# Patient Record
Sex: FEMALE | Race: WHITE | NOT HISPANIC OR LATINO | Employment: FULL TIME | URBAN - METROPOLITAN AREA
[De-identification: names, ages, dates, MRNs, and addresses within clinical notes are randomized per-mention and may not be internally consistent; named-entity substitution may affect disease eponyms.]

---

## 2017-07-25 ENCOUNTER — LAB VISIT (OUTPATIENT)
Dept: LAB | Facility: HOSPITAL | Age: 32
End: 2017-07-25
Attending: OBSTETRICS & GYNECOLOGY
Payer: COMMERCIAL

## 2017-07-25 ENCOUNTER — OFFICE VISIT (OUTPATIENT)
Dept: OBSTETRICS AND GYNECOLOGY | Facility: CLINIC | Age: 32
End: 2017-07-25
Payer: COMMERCIAL

## 2017-07-25 VITALS
BODY MASS INDEX: 48.45 KG/M2 | HEIGHT: 61 IN | DIASTOLIC BLOOD PRESSURE: 70 MMHG | SYSTOLIC BLOOD PRESSURE: 120 MMHG | WEIGHT: 256.63 LBS

## 2017-07-25 DIAGNOSIS — Z11.3 ROUTINE SCREENING FOR STI (SEXUALLY TRANSMITTED INFECTION): ICD-10-CM

## 2017-07-25 DIAGNOSIS — Z11.3 SCREENING EXAMINATION FOR VENEREAL DISEASE: ICD-10-CM

## 2017-07-25 DIAGNOSIS — Z01.419 ENCOUNTER FOR GYNECOLOGICAL EXAMINATION (GENERAL) (ROUTINE) WITHOUT ABNORMAL FINDINGS: Primary | ICD-10-CM

## 2017-07-25 DIAGNOSIS — Z11.51 SCREENING FOR HUMAN PAPILLOMAVIRUS: ICD-10-CM

## 2017-07-25 PROCEDURE — 99999 PR PBB SHADOW E&M-NEW PATIENT-LVL III: CPT | Mod: PBBFAC,,, | Performed by: OBSTETRICS & GYNECOLOGY

## 2017-07-25 PROCEDURE — 99385 PREV VISIT NEW AGE 18-39: CPT | Mod: S$GLB,,, | Performed by: OBSTETRICS & GYNECOLOGY

## 2017-07-25 PROCEDURE — 88175 CYTOPATH C/V AUTO FLUID REDO: CPT | Performed by: PATHOLOGY

## 2017-07-25 PROCEDURE — 87591 N.GONORRHOEAE DNA AMP PROB: CPT

## 2017-07-25 PROCEDURE — 87624 HPV HI-RISK TYP POOLED RSLT: CPT

## 2017-07-25 PROCEDURE — 88141 CYTOPATH C/V INTERPRET: CPT | Mod: ,,, | Performed by: PATHOLOGY

## 2017-07-25 PROCEDURE — 86703 HIV-1/HIV-2 1 RESULT ANTBDY: CPT

## 2017-07-25 PROCEDURE — 86592 SYPHILIS TEST NON-TREP QUAL: CPT

## 2017-07-25 PROCEDURE — 87340 HEPATITIS B SURFACE AG IA: CPT

## 2017-07-25 RX ORDER — NORGESTIMATE AND ETHINYL ESTRADIOL 0.25-0.035
1 KIT ORAL DAILY
Qty: 30 TABLET | Refills: 11 | Status: SHIPPED | OUTPATIENT
Start: 2017-07-25 | End: 2018-07-08 | Stop reason: SDUPTHER

## 2017-07-25 RX ORDER — VALACYCLOVIR HYDROCHLORIDE 1 G/1
1000 TABLET, FILM COATED ORAL 2 TIMES DAILY
COMMUNITY
End: 2017-07-25 | Stop reason: SDUPTHER

## 2017-07-25 RX ORDER — NORGESTIMATE AND ETHINYL ESTRADIOL 0.25-0.035
1 KIT ORAL DAILY
COMMUNITY
End: 2017-07-25 | Stop reason: SDUPTHER

## 2017-07-25 RX ORDER — VALACYCLOVIR HYDROCHLORIDE 1 G/1
500 TABLET, FILM COATED ORAL 2 TIMES DAILY
Qty: 10 TABLET | Refills: 2 | Status: SHIPPED | OUTPATIENT
Start: 2017-07-25 | End: 2018-10-24 | Stop reason: SDUPTHER

## 2017-07-25 NOTE — PROGRESS NOTES
"CC: Well woman exam    Delmis Thrasher is a 31 y.o. female  presents for a well woman exam.  She is not established.      HPI:  Patient here for annual doing well with current ocp and valtrex.  Periods normal and last 4-5 days.  No breast concerns no pelvic pain.      Past Medical History:   Diagnosis Date    Asthma     Herpes simplex virus (HSV) infection     History of gonorrhea        History reviewed. No pertinent surgical history.    OB History    Para Term  AB Living   0 0 0 0 0 0   SAB TAB Ectopic Multiple Live Births   0 0 0 0 0             Family History   Problem Relation Age of Onset    Diabetes Father     Hypertension Mother        Social History   Substance Use Topics    Smoking status: Never Smoker    Smokeless tobacco: Never Used    Alcohol use Yes       /70   Ht 5' 1" (1.549 m)   Wt 116.4 kg (256 lb 9.9 oz)   LMP 2017   BMI 48.49 kg/m²     ROS:  Review of Systems   Constitutional: Negative for fatigue.   Respiratory: Negative for shortness of breath.    Cardiovascular: Negative for chest pain.   Gastrointestinal: Negative for abdominal pain, constipation, diarrhea and nausea.   Endocrine: Negative for heat intolerance.   Genitourinary: Negative for dyspareunia, dysuria, menstrual problem, pelvic pain, vaginal bleeding and vaginal discharge.   Musculoskeletal: Negative for back pain.   Skin: Negative for rash.   Neurological: Negative for headaches.   Psychiatric/Behavioral: Negative for dysphoric mood. The patient is not nervous/anxious.        Physical Exam:  Physical Exam:   Constitutional: She is oriented to person, place, and time. She appears well-developed and well-nourished.      Neck: No thyromegaly present.    Cardiovascular: Normal rate.     Pulmonary/Chest: Effort normal and breath sounds normal. Right breast exhibits no mass, no nipple discharge, no skin change, no tenderness and no bleeding. Left breast exhibits no mass, no nipple discharge, " no skin change, no tenderness and no bleeding.        Abdominal: Soft. Normal appearance and bowel sounds are normal. She exhibits no distension and no mass. There is no tenderness. There is no rebound and no guarding.     Genitourinary: Vagina normal and uterus normal. Pelvic exam was performed with patient supine. There is no rash, tenderness, lesion or injury on the right labia. There is no rash, tenderness, lesion or injury on the left labia. Uterus is not enlarged, not fixed and not tender. Cervix is normal. Right adnexum displays no mass, no tenderness and no fullness. Left adnexum displays no mass, no tenderness and no fullness. No erythema, tenderness, rectocele, cystocele or unspecified prolapse of vaginal walls in the vagina. No signs of injury around the vagina. No vaginal discharge found. Cervix exhibits no motion tenderness, no discharge and no friability.           Musculoskeletal: Normal range of motion and moves all extremeties.      Lymphadenopathy:     She has no axillary adenopathy.        Right: No supraclavicular adenopathy present.        Left: No supraclavicular adenopathy present.    Neurological: She is alert and oriented to person, place, and time.    Skin: Skin is warm and dry.    Psychiatric: She has a normal mood and affect. Her behavior is normal. Judgment normal.       ASSESSMENT AND PLAN  Encounter for gynecological examination (general) (routine) without abnormal findings  -     Liquid-based pap smear, screening  -     HPV High Risk Genotypes, PCR  -     C. trachomatis/N. gonorrhoeae by AMP DNA Cervix    Routine screening for STI (sexually transmitted infection)  -     RPR; Future; Expected date: 07/25/2017  -     HIV-1 and HIV-2 antibodies; Future; Expected date: 07/25/2017  -     Hepatitis B surface antigen; Future; Expected date: 07/25/2017  -     HPV High Risk Genotypes, PCR  -     C. trachomatis/N. gonorrhoeae by AMP DNA Cervix    Screening examination for venereal  disease    Screening for human papillomavirus  -     HPV High Risk Genotypes, PCR    Other orders  -     valacyclovir (VALTREX) 1000 MG tablet; Take 0.5 tablets (500 mg total) by mouth 2 (two) times daily.  Dispense: 10 tablet; Refill: 2  -     norgestimate-ethinyl estradiol (SPRINTEC, 28,) 0.25-35 mg-mcg per tablet; Take 1 tablet by mouth once daily.  Dispense: 30 tablet; Refill: 11        Patient was counseled today on A.C.S. Pap guidelines and recommendations for yearly pelvic exams, mammograms and monthly self breast exams; to see her PCP for other health maintenance.     Return in about 1 year (around 7/25/2018).

## 2017-07-26 LAB
C TRACH DNA SPEC QL NAA+PROBE: NOT DETECTED
HBV SURFACE AG SERPL QL IA: NEGATIVE
HIV 1+2 AB+HIV1 P24 AG SERPL QL IA: NEGATIVE
N GONORRHOEA DNA SPEC QL NAA+PROBE: NOT DETECTED
RPR SER QL: NORMAL

## 2017-07-31 LAB
HPV HR 12 DNA CVX QL NAA+PROBE: NEGATIVE
HPV16 DNA SPEC QL NAA+PROBE: NEGATIVE
HPV18 DNA SPEC QL NAA+PROBE: NEGATIVE

## 2018-07-09 RX ORDER — NORGESTIMATE AND ETHINYL ESTRADIOL 0.25-0.035
KIT ORAL
Qty: 28 TABLET | Refills: 1 | Status: SHIPPED | OUTPATIENT
Start: 2018-07-09 | End: 2018-09-04 | Stop reason: SDUPTHER

## 2018-09-04 RX ORDER — NORGESTIMATE AND ETHINYL ESTRADIOL 0.25-0.035
KIT ORAL
Qty: 28 TABLET | Refills: 1 | Status: SHIPPED | OUTPATIENT
Start: 2018-09-04 | End: 2018-09-11 | Stop reason: SDUPTHER

## 2018-09-11 ENCOUNTER — OFFICE VISIT (OUTPATIENT)
Dept: OBSTETRICS AND GYNECOLOGY | Facility: CLINIC | Age: 33
End: 2018-09-11
Payer: COMMERCIAL

## 2018-09-11 ENCOUNTER — PATIENT MESSAGE (OUTPATIENT)
Dept: OBSTETRICS AND GYNECOLOGY | Facility: CLINIC | Age: 33
End: 2018-09-11

## 2018-09-11 VITALS — BODY MASS INDEX: 43.8 KG/M2 | SYSTOLIC BLOOD PRESSURE: 118 MMHG | DIASTOLIC BLOOD PRESSURE: 76 MMHG | WEIGHT: 231.81 LBS

## 2018-09-11 DIAGNOSIS — R87.610 ASCUS OF CERVIX WITH NEGATIVE HIGH RISK HPV: ICD-10-CM

## 2018-09-11 DIAGNOSIS — Z01.419 WELL FEMALE EXAM WITH ROUTINE GYNECOLOGICAL EXAM: Primary | ICD-10-CM

## 2018-09-11 DIAGNOSIS — Z11.51 SCREENING FOR HUMAN PAPILLOMAVIRUS: ICD-10-CM

## 2018-09-11 DIAGNOSIS — Z12.31 VISIT FOR SCREENING MAMMOGRAM: ICD-10-CM

## 2018-09-11 DIAGNOSIS — Z11.3 SCREENING EXAMINATION FOR VENEREAL DISEASE: ICD-10-CM

## 2018-09-11 PROCEDURE — 88142 CYTOPATH C/V THIN LAYER: CPT

## 2018-09-11 PROCEDURE — 87491 CHLMYD TRACH DNA AMP PROBE: CPT

## 2018-09-11 PROCEDURE — 99395 PREV VISIT EST AGE 18-39: CPT | Mod: S$GLB,,, | Performed by: OBSTETRICS & GYNECOLOGY

## 2018-09-11 PROCEDURE — 87624 HPV HI-RISK TYP POOLED RSLT: CPT

## 2018-09-11 PROCEDURE — 99999 PR PBB SHADOW E&M-EST. PATIENT-LVL II: CPT | Mod: PBBFAC,,, | Performed by: OBSTETRICS & GYNECOLOGY

## 2018-09-11 RX ORDER — NORGESTIMATE AND ETHINYL ESTRADIOL 0.25-0.035
1 KIT ORAL DAILY
Qty: 28 TABLET | Refills: 11 | Status: SHIPPED | OUTPATIENT
Start: 2018-09-11 | End: 2019-10-15 | Stop reason: SDUPTHER

## 2018-09-11 NOTE — PROGRESS NOTES
Subjective:       Patient ID: Delmis Thrasher is a 33 y.o. female.    Chief Complaint:  Well Woman (17-pap-ASCUS hpv negative no h/o mammogram )      History of Present Illness  33 year old here for annual.  Abnormal pap with Dr. Castro and last years pap normal.  Will repeat today.  No concerns with periods.  Doing well on birth control.  No pelvic pain or breast concerns.  Desires sti testing.  No vaginal concerns     GYN & OB History  Patient's last menstrual period was 2018.   Date of Last Pap: 2017    OB History    Para Term  AB Living   0 0 0 0 0 0   SAB TAB Ectopic Multiple Live Births   0 0 0 0 0             Past Medical History:   Diagnosis Date    Asthma     Herpes simplex virus (HSV) infection     History of gonorrhea        History reviewed. No pertinent surgical history.    Review of Systems  Review of Systems   Constitutional: Negative for fatigue.   Respiratory: Negative for shortness of breath.    Cardiovascular: Negative for chest pain.   Gastrointestinal: Negative for abdominal pain, constipation, diarrhea and nausea.   Genitourinary: Negative for dyspareunia, dysuria, menstrual problem, pelvic pain, vaginal bleeding and vaginal discharge.   Musculoskeletal: Negative for back pain.   Skin: Negative for rash.   Neurological: Negative for headaches.   Hematological: Negative for adenopathy.   Psychiatric/Behavioral: Negative for dysphoric mood. The patient is not nervous/anxious.         Objective:   Physical Exam:   Constitutional: She is oriented to person, place, and time. She appears well-developed and well-nourished.      Neck: No thyromegaly present.    Cardiovascular: Normal rate.     Pulmonary/Chest: Effort normal and breath sounds normal. Right breast exhibits no mass, no nipple discharge, no skin change, no tenderness and no bleeding. Left breast exhibits no mass, no nipple discharge, no skin change, no tenderness and no bleeding.        Abdominal: Soft.  Normal appearance and bowel sounds are normal. She exhibits no distension and no mass. There is no tenderness. There is no rebound and no guarding.     Genitourinary: Vagina normal and uterus normal. Pelvic exam was performed with patient supine. There is no rash, tenderness, lesion or injury on the right labia. There is no rash, tenderness, lesion or injury on the left labia. Uterus is not enlarged, not fixed and not tender. Cervix is normal. Right adnexum displays no mass, no tenderness and no fullness. Left adnexum displays no mass, no tenderness and no fullness. No erythema, tenderness, rectocele, cystocele or unspecified prolapse of vaginal walls in the vagina. No signs of injury around the vagina. No vaginal discharge found. Cervix exhibits no motion tenderness, no discharge and no friability.           Musculoskeletal: Normal range of motion and moves all extremeties.      Lymphadenopathy:     She has no axillary adenopathy.        Right: No supraclavicular adenopathy present.        Left: No supraclavicular adenopathy present.    Neurological: She is alert and oriented to person, place, and time.    Skin: Skin is warm and dry.    Psychiatric: She has a normal mood and affect. Her behavior is normal. Judgment normal.        Assessment/ Plan:     Well female exam with routine gynecological exam  -     HPV High Risk Genotypes, PCR    Screening for human papillomavirus  -     HPV High Risk Genotypes, PCR    Screening examination for venereal disease  -     C. trachomatis/N. gonorrhoeae by AMP DNA    ASCUS of cervix with negative high risk HPV  -     Liquid-based pap smear, screening    Visit for screening mammogram  -     Mammo Digital Screening Bilat with Tomosynthesis CAD; Future; Expected date: 09/11/2018    Other orders  -     norgestimate-ethinyl estradiol (SPRINTEC, 28,) 0.25-35 mg-mcg per tablet; Take 1 tablet by mouth once daily.  Dispense: 28 tablet; Refill: 11         Follow-up in about 1 year (around  9/11/2019).    Patient was counseled today on A.C.S. Pap guidelines and recommendations for yearly pelvic exams, mammograms and monthly self breast exams; to see her PCP for other health maintenance.

## 2018-09-12 LAB
C TRACH DNA SPEC QL NAA+PROBE: NOT DETECTED
N GONORRHOEA DNA SPEC QL NAA+PROBE: NOT DETECTED

## 2018-09-14 LAB
HPV HR 12 DNA CVX QL NAA+PROBE: NEGATIVE
HPV16 AG SPEC QL: NEGATIVE
HPV18 DNA SPEC QL NAA+PROBE: NEGATIVE

## 2018-10-02 ENCOUNTER — OFFICE VISIT (OUTPATIENT)
Dept: OBSTETRICS AND GYNECOLOGY | Facility: CLINIC | Age: 33
End: 2018-10-02
Payer: COMMERCIAL

## 2018-10-02 VITALS
DIASTOLIC BLOOD PRESSURE: 80 MMHG | BODY MASS INDEX: 43.27 KG/M2 | WEIGHT: 229.19 LBS | SYSTOLIC BLOOD PRESSURE: 124 MMHG | HEIGHT: 61 IN

## 2018-10-02 DIAGNOSIS — Z20.2 POSSIBLE EXPOSURE TO STD: Primary | ICD-10-CM

## 2018-10-02 LAB
CANDIDA RRNA VAG QL PROBE: NEGATIVE
G VAGINALIS RRNA GENITAL QL PROBE: NEGATIVE
T VAGINALIS RRNA GENITAL QL PROBE: POSITIVE

## 2018-10-02 PROCEDURE — 99212 OFFICE O/P EST SF 10 MIN: CPT | Mod: S$GLB,,, | Performed by: OBSTETRICS & GYNECOLOGY

## 2018-10-02 PROCEDURE — 3008F BODY MASS INDEX DOCD: CPT | Mod: CPTII,S$GLB,, | Performed by: OBSTETRICS & GYNECOLOGY

## 2018-10-02 PROCEDURE — 87660 TRICHOMONAS VAGIN DIR PROBE: CPT

## 2018-10-02 PROCEDURE — 99999 PR PBB SHADOW E&M-EST. PATIENT-LVL III: CPT | Mod: PBBFAC,,, | Performed by: OBSTETRICS & GYNECOLOGY

## 2018-10-02 NOTE — PROGRESS NOTES
Subjective:       Patient ID: Delmis Thrasher is a 33 y.o. female.    Chief Complaint:  STD CHECK      History of Present Illness  33 year old here for std screening and to confirm trichomoniasis on her pap exam.  Patient denies vaginal discharge or pelvic pain.  No new sexual partners.       GYN & OB History  No LMP recorded.   Date of Last Pap: 2018    OB History    Para Term  AB Living   0 0 0 0 0 0   SAB TAB Ectopic Multiple Live Births   0 0 0 0 0             Past Medical History:   Diagnosis Date    Asthma     Herpes simplex virus (HSV) infection     History of gonorrhea        History reviewed. No pertinent surgical history.    Review of Systems  Review of Systems   Genitourinary: Negative for dyspareunia, dysuria, genital sores, menstrual problem, pelvic pain, vaginal bleeding and vaginal discharge.        Objective:   Physical Exam:               Genitourinary: Cervix is normal. Right adnexum displays no mass, no tenderness and no fullness. Left adnexum displays tenderness and fullness.                      Assessment/ Plan:     Possible exposure to STD  -     Vaginosis Screen by DNA Probe    wet prep normal   Will await affirm      Follow-up in about 1 year (around 10/2/2019).    Patient was counseled today on A.C.S. Pap guidelines and recommendations for yearly pelvic exams, mammograms and monthly self breast exams; to see her PCP for other health maintenance.

## 2018-10-05 ENCOUNTER — TELEPHONE (OUTPATIENT)
Dept: OBSTETRICS AND GYNECOLOGY | Facility: CLINIC | Age: 33
End: 2018-10-05

## 2018-10-05 RX ORDER — METRONIDAZOLE 500 MG/1
TABLET ORAL
Qty: 4 TABLET | Refills: 1 | Status: SHIPPED | OUTPATIENT
Start: 2018-10-05 | End: 2018-11-06

## 2018-10-09 ENCOUNTER — PATIENT MESSAGE (OUTPATIENT)
Dept: OBSTETRICS AND GYNECOLOGY | Facility: CLINIC | Age: 33
End: 2018-10-09

## 2018-10-24 RX ORDER — VALACYCLOVIR HYDROCHLORIDE 1 G/1
TABLET, FILM COATED ORAL
Qty: 10 TABLET | Refills: 2 | Status: SHIPPED | OUTPATIENT
Start: 2018-10-24 | End: 2020-06-09

## 2018-11-06 ENCOUNTER — OFFICE VISIT (OUTPATIENT)
Dept: OBSTETRICS AND GYNECOLOGY | Facility: CLINIC | Age: 33
End: 2018-11-06
Payer: COMMERCIAL

## 2018-11-06 VITALS
DIASTOLIC BLOOD PRESSURE: 70 MMHG | SYSTOLIC BLOOD PRESSURE: 112 MMHG | BODY MASS INDEX: 42.46 KG/M2 | HEIGHT: 61 IN | WEIGHT: 224.88 LBS

## 2018-11-06 DIAGNOSIS — N89.8 VAGINAL DISCHARGE: Primary | ICD-10-CM

## 2018-11-06 PROCEDURE — 3008F BODY MASS INDEX DOCD: CPT | Mod: CPTII,S$GLB,, | Performed by: OBSTETRICS & GYNECOLOGY

## 2018-11-06 PROCEDURE — 87660 TRICHOMONAS VAGIN DIR PROBE: CPT

## 2018-11-06 PROCEDURE — 99212 OFFICE O/P EST SF 10 MIN: CPT | Mod: S$GLB,,, | Performed by: OBSTETRICS & GYNECOLOGY

## 2018-11-06 PROCEDURE — 99999 PR PBB SHADOW E&M-EST. PATIENT-LVL III: CPT | Mod: PBBFAC,,, | Performed by: OBSTETRICS & GYNECOLOGY

## 2018-11-07 LAB
CANDIDA RRNA VAG QL PROBE: NEGATIVE
G VAGINALIS RRNA GENITAL QL PROBE: NEGATIVE
T VAGINALIS RRNA GENITAL QL PROBE: NEGATIVE

## 2018-11-07 NOTE — PROGRESS NOTES
Subjective:       Patient ID: Delmis Thrasher is a 33 y.o. female.    Chief Complaint:  Follow-up (BOBBY for Trich)      History of Present Illness  33 year old here for follow up.  Patient diagnosed with trich and here for a test of cure.  Patient denies discharge.  Patient's partner treated.      GYN & OB History  Patient's last menstrual period was 10/23/2018 (exact date).   Date of Last Pap: 2018    OB History    Para Term  AB Living   0 0 0 0 0 0   SAB TAB Ectopic Multiple Live Births   0 0 0 0 0         Obstetric Comments   Menarche 89       Past Medical History:   Diagnosis Date    Asthma     Herpes simplex virus (HSV) infection     History of gonorrhea     History of trichomoniasis 10/2018       Past Surgical History:   Procedure Laterality Date    MYRINGOTOMY W/ TUBES         Review of Systems  Review of Systems   Genitourinary: Negative for dyspareunia, dysuria, menstrual problem, pelvic pain, vaginal bleeding and vaginal discharge.        Objective:   Physical Exam:             Abdominal: Soft.     Genitourinary: Uterus normal. Cervix is normal. No vaginal discharge found.                 Psychiatric: She has a normal mood and affect.        Assessment/ Plan:     Vaginal discharge  -     Vaginosis Screen by DNA Probe         No Follow-up on file.    Patient was counseled today on A.C.S. Pap guidelines and recommendations for yearly pelvic exams, mammograms and monthly self breast exams; to see her PCP for other health maintenance.

## 2019-10-14 ENCOUNTER — TELEPHONE (OUTPATIENT)
Dept: OBSTETRICS AND GYNECOLOGY | Facility: CLINIC | Age: 34
End: 2019-10-14

## 2019-10-14 NOTE — TELEPHONE ENCOUNTER
Pt returned my call and Jannet lang pt's [facility] Tuesday [facility]15, per Dr Ruiz

## 2019-10-14 NOTE — TELEPHONE ENCOUNTER
lmor for pt to return my call about rescheduling Wednesday Annual appt at [facility] TO Annual appt Tuesday in [place] at 2:00 - per Dr Ruiz

## 2019-10-15 ENCOUNTER — OFFICE VISIT (OUTPATIENT)
Dept: OBSTETRICS AND GYNECOLOGY | Facility: CLINIC | Age: 34
End: 2019-10-15
Attending: OBSTETRICS & GYNECOLOGY
Payer: COMMERCIAL

## 2019-10-15 VITALS
BODY MASS INDEX: 43.99 KG/M2 | SYSTOLIC BLOOD PRESSURE: 130 MMHG | WEIGHT: 233 LBS | HEIGHT: 61 IN | DIASTOLIC BLOOD PRESSURE: 84 MMHG

## 2019-10-15 DIAGNOSIS — B97.7 HPV (HUMAN PAPILLOMA VIRUS) INFECTION: ICD-10-CM

## 2019-10-15 DIAGNOSIS — Z01.419 WELL FEMALE EXAM WITH ROUTINE GYNECOLOGICAL EXAM: Primary | ICD-10-CM

## 2019-10-15 PROCEDURE — 87624 HPV HI-RISK TYP POOLED RSLT: CPT

## 2019-10-15 PROCEDURE — 99395 PR PREVENTIVE VISIT,EST,18-39: ICD-10-PCS | Mod: S$GLB,,, | Performed by: OBSTETRICS & GYNECOLOGY

## 2019-10-15 PROCEDURE — 88175 CYTOPATH C/V AUTO FLUID REDO: CPT

## 2019-10-15 PROCEDURE — 99999 PR PBB SHADOW E&M-EST. PATIENT-LVL III: ICD-10-PCS | Mod: PBBFAC,,, | Performed by: OBSTETRICS & GYNECOLOGY

## 2019-10-15 PROCEDURE — 99999 PR PBB SHADOW E&M-EST. PATIENT-LVL III: CPT | Mod: PBBFAC,,, | Performed by: OBSTETRICS & GYNECOLOGY

## 2019-10-15 PROCEDURE — 99395 PREV VISIT EST AGE 18-39: CPT | Mod: S$GLB,,, | Performed by: OBSTETRICS & GYNECOLOGY

## 2019-10-15 RX ORDER — NORGESTIMATE AND ETHINYL ESTRADIOL 0.25-0.035
1 KIT ORAL DAILY
Qty: 28 TABLET | Refills: 11 | Status: SHIPPED | OUTPATIENT
Start: 2019-10-15 | End: 2020-05-19

## 2019-10-22 RX ORDER — FLUCONAZOLE 150 MG/1
150 TABLET ORAL ONCE
Qty: 1 TABLET | Refills: 1 | Status: SHIPPED | OUTPATIENT
Start: 2019-10-22 | End: 2019-10-22

## 2020-04-10 DIAGNOSIS — H10.9 CONJUNCTIVITIS, UNSPECIFIED CONJUNCTIVITIS TYPE, UNSPECIFIED LATERALITY: Primary | ICD-10-CM

## 2020-04-10 RX ORDER — POLYMYXIN B SULFATE AND TRIMETHOPRIM 1; 10000 MG/ML; [USP'U]/ML
1 SOLUTION OPHTHALMIC EVERY 6 HOURS
Qty: 1.3334 ML | Refills: 0 | Status: SHIPPED | OUTPATIENT
Start: 2020-04-10 | End: 2020-04-15

## 2020-05-19 ENCOUNTER — OFFICE VISIT (OUTPATIENT)
Dept: OBSTETRICS AND GYNECOLOGY | Facility: CLINIC | Age: 35
End: 2020-05-19
Payer: COMMERCIAL

## 2020-05-19 ENCOUNTER — PROCEDURE VISIT (OUTPATIENT)
Dept: OBSTETRICS AND GYNECOLOGY | Facility: CLINIC | Age: 35
End: 2020-05-19
Payer: COMMERCIAL

## 2020-05-19 ENCOUNTER — LAB VISIT (OUTPATIENT)
Dept: LAB | Facility: HOSPITAL | Age: 35
End: 2020-05-19
Attending: OBSTETRICS & GYNECOLOGY
Payer: COMMERCIAL

## 2020-05-19 VITALS
HEIGHT: 61 IN | TEMPERATURE: 98 F | BODY MASS INDEX: 44.03 KG/M2 | DIASTOLIC BLOOD PRESSURE: 80 MMHG | SYSTOLIC BLOOD PRESSURE: 110 MMHG

## 2020-05-19 DIAGNOSIS — O36.80X0 PREGNANCY WITH UNCERTAIN FETAL VIABILITY, SINGLE OR UNSPECIFIED FETUS: ICD-10-CM

## 2020-05-19 DIAGNOSIS — Z34.90 PREGNANCY, UNSPECIFIED GESTATIONAL AGE: Primary | ICD-10-CM

## 2020-05-19 DIAGNOSIS — Z34.90 PREGNANCY, UNSPECIFIED GESTATIONAL AGE: ICD-10-CM

## 2020-05-19 DIAGNOSIS — O20.0 THREATENED ABORTION: Primary | ICD-10-CM

## 2020-05-19 LAB
ABO + RH BLD: NORMAL
BASOPHILS # BLD AUTO: 0.06 K/UL (ref 0–0.2)
BASOPHILS NFR BLD: 0.7 % (ref 0–1.9)
BLD GP AB SCN CELLS X3 SERPL QL: NORMAL
DIFFERENTIAL METHOD: ABNORMAL
EOSINOPHIL # BLD AUTO: 0.1 K/UL (ref 0–0.5)
EOSINOPHIL NFR BLD: 1.5 % (ref 0–8)
ERYTHROCYTE [DISTWIDTH] IN BLOOD BY AUTOMATED COUNT: 12.6 % (ref 11.5–14.5)
HCG INTACT+B SERPL-ACNC: 8375 MIU/ML
HCT VFR BLD AUTO: 45.1 % (ref 37–48.5)
HGB BLD-MCNC: 14 G/DL (ref 12–16)
IMM GRANULOCYTES # BLD AUTO: 0.02 K/UL (ref 0–0.04)
IMM GRANULOCYTES NFR BLD AUTO: 0.2 % (ref 0–0.5)
LYMPHOCYTES # BLD AUTO: 2.4 K/UL (ref 1–4.8)
LYMPHOCYTES NFR BLD: 28.1 % (ref 18–48)
MCH RBC QN AUTO: 29.3 PG (ref 27–31)
MCHC RBC AUTO-ENTMCNC: 31 G/DL (ref 32–36)
MCV RBC AUTO: 94 FL (ref 82–98)
MONOCYTES # BLD AUTO: 0.4 K/UL (ref 0.3–1)
MONOCYTES NFR BLD: 4.5 % (ref 4–15)
NEUTROPHILS # BLD AUTO: 5.7 K/UL (ref 1.8–7.7)
NEUTROPHILS NFR BLD: 65 % (ref 38–73)
NRBC BLD-RTO: 0 /100 WBC
PLATELET # BLD AUTO: 322 K/UL (ref 150–350)
PMV BLD AUTO: 10.5 FL (ref 9.2–12.9)
PROGEST SERPL-MCNC: 2.9 NG/ML
RBC # BLD AUTO: 4.78 M/UL (ref 4–5.4)
WBC # BLD AUTO: 8.69 K/UL (ref 3.9–12.7)

## 2020-05-19 PROCEDURE — 85025 COMPLETE CBC W/AUTO DIFF WBC: CPT

## 2020-05-19 PROCEDURE — 84702 CHORIONIC GONADOTROPIN TEST: CPT

## 2020-05-19 PROCEDURE — 86850 RBC ANTIBODY SCREEN: CPT

## 2020-05-19 PROCEDURE — 99999 PR PBB SHADOW E&M-EST. PATIENT-LVL III: ICD-10-PCS | Mod: PBBFAC,,, | Performed by: OBSTETRICS & GYNECOLOGY

## 2020-05-19 PROCEDURE — 3008F BODY MASS INDEX DOCD: CPT | Mod: CPTII,S$GLB,, | Performed by: OBSTETRICS & GYNECOLOGY

## 2020-05-19 PROCEDURE — 99213 OFFICE O/P EST LOW 20 MIN: CPT | Mod: S$GLB,,, | Performed by: OBSTETRICS & GYNECOLOGY

## 2020-05-19 PROCEDURE — 3008F PR BODY MASS INDEX (BMI) DOCUMENTED: ICD-10-PCS | Mod: CPTII,S$GLB,, | Performed by: OBSTETRICS & GYNECOLOGY

## 2020-05-19 PROCEDURE — 76801 PR US, OB <14WKS, TRANSABD, SINGLE GESTATION: ICD-10-PCS | Mod: S$GLB,,, | Performed by: OBSTETRICS & GYNECOLOGY

## 2020-05-19 PROCEDURE — 99999 PR PBB SHADOW E&M-EST. PATIENT-LVL III: CPT | Mod: PBBFAC,,, | Performed by: OBSTETRICS & GYNECOLOGY

## 2020-05-19 PROCEDURE — 99213 PR OFFICE/OUTPT VISIT, EST, LEVL III, 20-29 MIN: ICD-10-PCS | Mod: S$GLB,,, | Performed by: OBSTETRICS & GYNECOLOGY

## 2020-05-19 PROCEDURE — 76801 OB US < 14 WKS SINGLE FETUS: CPT | Mod: S$GLB,,, | Performed by: OBSTETRICS & GYNECOLOGY

## 2020-05-19 PROCEDURE — 84144 ASSAY OF PROGESTERONE: CPT

## 2020-05-20 ENCOUNTER — TELEPHONE (OUTPATIENT)
Dept: OBSTETRICS AND GYNECOLOGY | Facility: CLINIC | Age: 35
End: 2020-05-20

## 2020-05-20 DIAGNOSIS — Z34.90 PREGNANCY, UNSPECIFIED GESTATIONAL AGE: Primary | ICD-10-CM

## 2020-05-21 ENCOUNTER — LAB VISIT (OUTPATIENT)
Dept: LAB | Facility: HOSPITAL | Age: 35
End: 2020-05-21
Attending: OBSTETRICS & GYNECOLOGY
Payer: COMMERCIAL

## 2020-05-21 DIAGNOSIS — Z34.90 PREGNANCY, UNSPECIFIED GESTATIONAL AGE: ICD-10-CM

## 2020-05-21 PROCEDURE — 84702 CHORIONIC GONADOTROPIN TEST: CPT

## 2020-05-22 LAB — HCG INTACT+B SERPL-ACNC: 6630 MIU/ML

## 2020-05-24 NOTE — PROGRESS NOTES
"Chief Complaint: Absence of Menses     HPI:      Delmis hTrasher is a 34 y.o.  who presents complaining of absence of menses.  She reports lmp that would put her between 7-9 weeks  Denies bleeding or cramping.  Ultrasound reviewed and concerns for sab reviewed due to dating and ultrasound imaging.  Discussed trending hcg levels and repeat ultrasound.  Discussed options for management of a sab and patient will consider options.  ER precautions reviewed.        Past Medical History:   Diagnosis Date    Asthma     Herpes simplex virus (HSV) infection     History of gonorrhea     History of trichomoniasis 10/2018     Past Surgical History:   Procedure Laterality Date    MYRINGOTOMY W/ TUBES       Social History     Tobacco Use    Smoking status: Never Smoker    Smokeless tobacco: Never Used   Substance Use Topics    Alcohol use: Yes    Drug use: No     Family History   Problem Relation Age of Onset    Diabetes Father     Hypertension Mother     Colon cancer Neg Hx     Breast cancer Neg Hx     Cancer Neg Hx     Ovarian cancer Neg Hx      OB History    Para Term  AB Living   0 0 0 0 0 0   SAB TAB Ectopic Multiple Live Births   0 0 0 0 0   Obstetric Comments   Menarche 89       ROS:     GENERAL: Denies weight gain or weight loss. Feeling well overall.   SKIN: Denies rash or lesions.   BREASTS: Denies lumps or nipple discharge. + tenderness.  ABDOMEN: Denies abdominal pain, constipation, diarrhea. nausea  URINARY: Denies dysuria, hematuria. + frequency.  NEUROLOGIC: Denies syncope or weakness.   PSYCHIATRIC: Denies depression, anxiety or mood swings.    Physical Exam:      PHYSICAL EXAM:  /80   Temp 98.4 °F (36.9 °C)   Ht 5' 1" (1.549 m)   LMP 03/15/2020   BMI 44.03 kg/m²   Body mass index is 44.03 kg/m².     APPEARANCE: Well nourished, well developed, in no acute distress.  PSYCH: Appropriate mood and affect.  EXTREMITIES: No edema.    Assessment/Plan:       ICD-10-CM " ICD-9-CM    1. Threatened  O20.0 640.00 US OB/GYN Procedure ([facility]) - Extended List   2. Pregnancy, unspecified gestational age Z34.90 V22.2 Type & Screen - Ob Profile      CBC auto differential      hCG, quantitative      Progesterone      US OB/GYN Procedure ([facility]) - Extended List       No follow-ups on file.    Counselin. Ultrasound findings reviewed with the patient.  Concerns for sab and options for treatment reviewed.  Follow up labs and ultrasound scheduled.  2.  Questions answered  Follow up after labs

## 2020-05-25 ENCOUNTER — LAB VISIT (OUTPATIENT)
Dept: INTERNAL MEDICINE | Facility: CLINIC | Age: 35
End: 2020-05-25
Payer: COMMERCIAL

## 2020-05-25 ENCOUNTER — TELEPHONE (OUTPATIENT)
Dept: OBSTETRICS AND GYNECOLOGY | Facility: CLINIC | Age: 35
End: 2020-05-25

## 2020-05-25 DIAGNOSIS — O02.1 MISSED ABORTION: Primary | ICD-10-CM

## 2020-05-25 DIAGNOSIS — O02.1 MISSED ABORTION: ICD-10-CM

## 2020-05-25 PROCEDURE — U0003 INFECTIOUS AGENT DETECTION BY NUCLEIC ACID (DNA OR RNA); SEVERE ACUTE RESPIRATORY SYNDROME CORONAVIRUS 2 (SARS-COV-2) (CORONAVIRUS DISEASE [COVID-19]), AMPLIFIED PROBE TECHNIQUE, MAKING USE OF HIGH THROUGHPUT TECHNOLOGIES AS DESCRIBED BY CMS-2020-01-R: HCPCS

## 2020-05-25 NOTE — TELEPHONE ENCOUNTER
----- Message from Sasha Ruiz MD sent at 5/25/2020 10:01 AM CDT -----  Please schedule suction wilfrido Harrell is md   Please coordinate with her and let patient know time she is aware I could not do it until Friday   She knows lab results

## 2020-05-26 LAB — SARS-COV-2 RNA RESP QL NAA+PROBE: NOT DETECTED

## 2020-05-26 NOTE — H&P
"  History of Present Illness    Delmis Thrasher is a 34 y.o.  presented to clinic last week for confirmation of pregnancy. U/S could not confirm viable gestation, so hCGs were trended and noted to be decreasing. After counseling on expectant vs medical vs surgical management pt opted for surgical management.  Pre-operative optimization was not needed.    LMP would put patient between 8-10 weeks this week.      Past Medical History:   Diagnosis Date    Asthma     Herpes simplex virus (HSV) infection     History of gonorrhea     History of trichomoniasis 10/2018       Past Surgical History:   Procedure Laterality Date    MYRINGOTOMY W/ TUBES         Family History   Problem Relation Age of Onset    Diabetes Father     Hypertension Mother     Colon cancer Neg Hx     Breast cancer Neg Hx     Cancer Neg Hx     Ovarian cancer Neg Hx        Social History     Tobacco Use   Smoking Status Never Smoker   Smokeless Tobacco Never Used       Social History     Substance and Sexual Activity   Sexual Activity Yes    Partners: Male    Birth control/protection: OCP    Comment: Single:         OB History    Para Term  AB Living   0 0 0 0 0 0   SAB TAB Ectopic Multiple Live Births   0 0 0 0 0   Obstetric Comments   Menarche 89     From exam on 20:     ROS:      GENERAL: Denies weight gain or weight loss. Feeling well overall.   SKIN: Denies rash or lesions.   BREASTS: Denies lumps or nipple discharge. + tenderness.  ABDOMEN: Denies abdominal pain, constipation, diarrhea. nausea  URINARY: Denies dysuria, hematuria. + frequency.  NEUROLOGIC: Denies syncope or weakness.   PSYCHIATRIC: Denies depression, anxiety or mood swings.     Physical Exam:      PHYSICAL EXAM:  /80   Temp 98.4 °F (36.9 °C)   Ht 5' 1" (1.549 m)   LMP 03/15/2020   BMI 44.03 kg/m²   Body mass index is 44.03 kg/m².      APPEARANCE: Well nourished, well developed, in no acute distress.  PSYCH: Appropriate mood and " affect.  EXTREMITIES: No edema.       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 The embryo is disproportionately small for the size of the amniotic sac (measures 3 mm ), and no embryonic heart motion is seen by realtime  scanning. The CRL measures less than 7 mm. While these findings are concerning, they are not diagnostic for a nonviable IUP without other supporting clinical data. If  other clinical data are not available to confirm a nonviable IUP, recommend f/u exam in 7+ days to assess viability.  A small posterior uterine myoma is seen.    Assessment/ Plan:     Missed     1. To OR for Suction D&C on 3/27/2020    Counseling     With the patient on the morning of surgery I will have a full discussion of the risks, benefits, and alternatives of all procedures to be performed, including but not limited to injury to other organs, failure to resolve problem, recurrence of disease state, scar tissue formation preventing future pregnancy, and infection. We will discuss the risks, benefits, and alternatives to blood transfusion as well.   This conversation has been held between Dr. Ruiz and the patient already, but I will personally review on the morning of the procedure.

## 2020-05-27 ENCOUNTER — HOSPITAL ENCOUNTER (OUTPATIENT)
Facility: OTHER | Age: 35
Discharge: HOME | End: 2020-05-27
Attending: OBSTETRICS & GYNECOLOGY | Admitting: OBSTETRICS & GYNECOLOGY
Payer: COMMERCIAL

## 2020-05-27 ENCOUNTER — ANESTHESIA EVENT (OUTPATIENT)
Dept: SURGERY | Facility: OTHER | Age: 35
End: 2020-05-27
Payer: COMMERCIAL

## 2020-05-27 ENCOUNTER — ANESTHESIA (OUTPATIENT)
Dept: SURGERY | Facility: OTHER | Age: 35
End: 2020-05-27
Payer: COMMERCIAL

## 2020-05-27 VITALS
HEIGHT: 61 IN | OXYGEN SATURATION: 98 % | RESPIRATION RATE: 16 BRPM | SYSTOLIC BLOOD PRESSURE: 128 MMHG | WEIGHT: 230 LBS | TEMPERATURE: 99 F | DIASTOLIC BLOOD PRESSURE: 70 MMHG | BODY MASS INDEX: 43.43 KG/M2 | HEART RATE: 70 BPM

## 2020-05-27 DIAGNOSIS — O02.1 MISSED AB: Primary | ICD-10-CM

## 2020-05-27 PROCEDURE — 88305 TISSUE EXAM BY PATHOLOGIST: CPT | Performed by: PATHOLOGY

## 2020-05-27 PROCEDURE — 25000003 PHARM REV CODE 250: Performed by: NURSE ANESTHETIST, CERTIFIED REGISTERED

## 2020-05-27 PROCEDURE — 37000009 HC ANESTHESIA EA ADD 15 MINS: Performed by: OBSTETRICS & GYNECOLOGY

## 2020-05-27 PROCEDURE — 71000016 HC POSTOP RECOV ADDL HR: Performed by: OBSTETRICS & GYNECOLOGY

## 2020-05-27 PROCEDURE — 63600175 PHARM REV CODE 636 W HCPCS: Performed by: ANESTHESIOLOGY

## 2020-05-27 PROCEDURE — 88305 TISSUE EXAM BY PATHOLOGIST: CPT | Mod: 26,,, | Performed by: PATHOLOGY

## 2020-05-27 PROCEDURE — 36000705 HC OR TIME LEV I EA ADD 15 MIN: Performed by: OBSTETRICS & GYNECOLOGY

## 2020-05-27 PROCEDURE — 71000033 HC RECOVERY, INTIAL HOUR: Performed by: OBSTETRICS & GYNECOLOGY

## 2020-05-27 PROCEDURE — 63600175 PHARM REV CODE 636 W HCPCS: Performed by: NURSE ANESTHETIST, CERTIFIED REGISTERED

## 2020-05-27 PROCEDURE — 25000003 PHARM REV CODE 250: Performed by: OBSTETRICS & GYNECOLOGY

## 2020-05-27 PROCEDURE — 37000008 HC ANESTHESIA 1ST 15 MINUTES: Performed by: OBSTETRICS & GYNECOLOGY

## 2020-05-27 PROCEDURE — 25000003 PHARM REV CODE 250: Performed by: ANESTHESIOLOGY

## 2020-05-27 PROCEDURE — 71000015 HC POSTOP RECOV 1ST HR: Performed by: OBSTETRICS & GYNECOLOGY

## 2020-05-27 PROCEDURE — 59820 CARE OF MISCARRIAGE: CPT | Mod: ,,, | Performed by: OBSTETRICS & GYNECOLOGY

## 2020-05-27 PROCEDURE — 59820 PR SURG RX MISSED ABORTN,1ST TRI: ICD-10-PCS | Mod: ,,, | Performed by: OBSTETRICS & GYNECOLOGY

## 2020-05-27 PROCEDURE — 88305 TISSUE EXAM BY PATHOLOGIST: ICD-10-PCS | Mod: 26,,, | Performed by: PATHOLOGY

## 2020-05-27 PROCEDURE — 36000704 HC OR TIME LEV I 1ST 15 MIN: Performed by: OBSTETRICS & GYNECOLOGY

## 2020-05-27 RX ORDER — MEPERIDINE HYDROCHLORIDE 25 MG/ML
12.5 INJECTION INTRAMUSCULAR; INTRAVENOUS; SUBCUTANEOUS ONCE AS NEEDED
Status: DISCONTINUED | OUTPATIENT
Start: 2020-05-27 | End: 2020-05-27 | Stop reason: HOSPADM

## 2020-05-27 RX ORDER — FENTANYL CITRATE 50 UG/ML
INJECTION, SOLUTION INTRAMUSCULAR; INTRAVENOUS
Status: DISCONTINUED | OUTPATIENT
Start: 2020-05-27 | End: 2020-05-27

## 2020-05-27 RX ORDER — OXYCODONE AND ACETAMINOPHEN 5; 325 MG/1; MG/1
1 TABLET ORAL EVERY 4 HOURS PRN
Qty: 5 TABLET | Refills: 0 | Status: SHIPPED | OUTPATIENT
Start: 2020-05-27 | End: 2020-07-10

## 2020-05-27 RX ORDER — ROCURONIUM BROMIDE 10 MG/ML
INJECTION, SOLUTION INTRAVENOUS
Status: DISCONTINUED | OUTPATIENT
Start: 2020-05-27 | End: 2020-05-27

## 2020-05-27 RX ORDER — SUCCINYLCHOLINE CHLORIDE 20 MG/ML
INJECTION INTRAMUSCULAR; INTRAVENOUS
Status: DISCONTINUED | OUTPATIENT
Start: 2020-05-27 | End: 2020-05-27

## 2020-05-27 RX ORDER — HYDROCODONE BITARTRATE AND ACETAMINOPHEN 5; 325 MG/1; MG/1
1 TABLET ORAL EVERY 4 HOURS PRN
Status: DISCONTINUED | OUTPATIENT
Start: 2020-05-27 | End: 2020-05-27 | Stop reason: HOSPADM

## 2020-05-27 RX ORDER — SILVER NITRATE 38.21; 12.74 MG/1; MG/1
STICK TOPICAL
Status: DISCONTINUED | OUTPATIENT
Start: 2020-05-27 | End: 2020-05-27 | Stop reason: HOSPADM

## 2020-05-27 RX ORDER — DIPHENHYDRAMINE HYDROCHLORIDE 50 MG/ML
12.5 INJECTION INTRAMUSCULAR; INTRAVENOUS EVERY 30 MIN PRN
Status: DISCONTINUED | OUTPATIENT
Start: 2020-05-27 | End: 2020-05-27 | Stop reason: HOSPADM

## 2020-05-27 RX ORDER — ONDANSETRON 8 MG/1
8 TABLET, ORALLY DISINTEGRATING ORAL EVERY 8 HOURS PRN
Status: DISCONTINUED | OUTPATIENT
Start: 2020-05-27 | End: 2020-05-27 | Stop reason: HOSPADM

## 2020-05-27 RX ORDER — OXYCODONE HYDROCHLORIDE 5 MG/1
5 TABLET ORAL
Status: DISCONTINUED | OUTPATIENT
Start: 2020-05-27 | End: 2020-05-27 | Stop reason: HOSPADM

## 2020-05-27 RX ORDER — DIPHENHYDRAMINE HYDROCHLORIDE 50 MG/ML
25 INJECTION INTRAMUSCULAR; INTRAVENOUS EVERY 4 HOURS PRN
Status: DISCONTINUED | OUTPATIENT
Start: 2020-05-27 | End: 2020-05-27 | Stop reason: HOSPADM

## 2020-05-27 RX ORDER — PREGABALIN 50 MG/1
50 CAPSULE ORAL ONCE
Status: COMPLETED | OUTPATIENT
Start: 2020-05-27 | End: 2020-05-27

## 2020-05-27 RX ORDER — IBUPROFEN 800 MG/1
800 TABLET ORAL EVERY 8 HOURS PRN
Qty: 20 TABLET | Refills: 0 | Status: SHIPPED | OUTPATIENT
Start: 2020-05-27 | End: 2020-07-10

## 2020-05-27 RX ORDER — HYDROMORPHONE HYDROCHLORIDE 2 MG/ML
0.4 INJECTION, SOLUTION INTRAMUSCULAR; INTRAVENOUS; SUBCUTANEOUS EVERY 5 MIN PRN
Status: DISCONTINUED | OUTPATIENT
Start: 2020-05-27 | End: 2020-05-27 | Stop reason: HOSPADM

## 2020-05-27 RX ORDER — ONDANSETRON 2 MG/ML
4 INJECTION INTRAMUSCULAR; INTRAVENOUS DAILY PRN
Status: DISCONTINUED | OUTPATIENT
Start: 2020-05-27 | End: 2020-05-27 | Stop reason: HOSPADM

## 2020-05-27 RX ORDER — SODIUM CHLORIDE, SODIUM LACTATE, POTASSIUM CHLORIDE, CALCIUM CHLORIDE 600; 310; 30; 20 MG/100ML; MG/100ML; MG/100ML; MG/100ML
INJECTION, SOLUTION INTRAVENOUS CONTINUOUS PRN
Status: DISCONTINUED | OUTPATIENT
Start: 2020-05-27 | End: 2020-05-27

## 2020-05-27 RX ORDER — DIPHENHYDRAMINE HCL 25 MG
25 CAPSULE ORAL EVERY 4 HOURS PRN
Status: DISCONTINUED | OUTPATIENT
Start: 2020-05-27 | End: 2020-05-27 | Stop reason: HOSPADM

## 2020-05-27 RX ORDER — PROPOFOL 10 MG/ML
VIAL (ML) INTRAVENOUS
Status: DISCONTINUED | OUTPATIENT
Start: 2020-05-27 | End: 2020-05-27

## 2020-05-27 RX ORDER — DEXAMETHASONE SODIUM PHOSPHATE 4 MG/ML
INJECTION, SOLUTION INTRA-ARTICULAR; INTRALESIONAL; INTRAMUSCULAR; INTRAVENOUS; SOFT TISSUE
Status: DISCONTINUED | OUTPATIENT
Start: 2020-05-27 | End: 2020-05-27

## 2020-05-27 RX ORDER — ACETAMINOPHEN 500 MG
1000 TABLET ORAL
Status: COMPLETED | OUTPATIENT
Start: 2020-05-27 | End: 2020-05-27

## 2020-05-27 RX ORDER — MIDAZOLAM HYDROCHLORIDE 1 MG/ML
INJECTION INTRAMUSCULAR; INTRAVENOUS
Status: DISCONTINUED | OUTPATIENT
Start: 2020-05-27 | End: 2020-05-27

## 2020-05-27 RX ORDER — SODIUM CHLORIDE 9 MG/ML
INJECTION, SOLUTION INTRAVENOUS CONTINUOUS
Status: DISCONTINUED | OUTPATIENT
Start: 2020-05-27 | End: 2020-05-27 | Stop reason: HOSPADM

## 2020-05-27 RX ORDER — LIDOCAINE HYDROCHLORIDE 10 MG/ML
INJECTION, SOLUTION INTRAVENOUS
Status: DISCONTINUED | OUTPATIENT
Start: 2020-05-27 | End: 2020-05-27

## 2020-05-27 RX ORDER — SODIUM CHLORIDE 0.9 % (FLUSH) 0.9 %
3 SYRINGE (ML) INJECTION
Status: DISCONTINUED | OUTPATIENT
Start: 2020-05-27 | End: 2020-05-27 | Stop reason: HOSPADM

## 2020-05-27 RX ORDER — ONDANSETRON 2 MG/ML
INJECTION INTRAMUSCULAR; INTRAVENOUS
Status: DISCONTINUED | OUTPATIENT
Start: 2020-05-27 | End: 2020-05-27

## 2020-05-27 RX ORDER — MUPIROCIN 20 MG/G
OINTMENT TOPICAL
Status: DISCONTINUED | OUTPATIENT
Start: 2020-05-27 | End: 2020-05-27 | Stop reason: HOSPADM

## 2020-05-27 RX ORDER — KETOROLAC TROMETHAMINE 30 MG/ML
INJECTION, SOLUTION INTRAMUSCULAR; INTRAVENOUS
Status: DISCONTINUED | OUTPATIENT
Start: 2020-05-27 | End: 2020-05-27

## 2020-05-27 RX ADMIN — FENTANYL CITRATE 100 MCG: 50 INJECTION, SOLUTION INTRAMUSCULAR; INTRAVENOUS at 07:05

## 2020-05-27 RX ADMIN — ROCURONIUM BROMIDE 10 MG: 10 INJECTION, SOLUTION INTRAVENOUS at 07:05

## 2020-05-27 RX ADMIN — DOXYCYCLINE 100 MG: 100 INJECTION, POWDER, LYOPHILIZED, FOR SOLUTION INTRAVENOUS at 07:05

## 2020-05-27 RX ADMIN — PROPOFOL 200 MG: 10 INJECTION, EMULSION INTRAVENOUS at 07:05

## 2020-05-27 RX ADMIN — MIDAZOLAM HYDROCHLORIDE 2 MG: 1 INJECTION, SOLUTION INTRAMUSCULAR; INTRAVENOUS at 07:05

## 2020-05-27 RX ADMIN — DEXAMETHASONE SODIUM PHOSPHATE 8 MG: 4 INJECTION, SOLUTION INTRAMUSCULAR; INTRAVENOUS at 07:05

## 2020-05-27 RX ADMIN — PREGABALIN 50 MG: 50 CAPSULE ORAL at 07:05

## 2020-05-27 RX ADMIN — CARBOXYMETHYLCELLULOSE SODIUM 2 DROP: 2.5 SOLUTION/ DROPS OPHTHALMIC at 07:05

## 2020-05-27 RX ADMIN — KETOROLAC TROMETHAMINE 30 MG: 30 INJECTION, SOLUTION INTRAMUSCULAR; INTRAVENOUS at 08:05

## 2020-05-27 RX ADMIN — LIDOCAINE HYDROCHLORIDE 100 MG: 10 INJECTION, SOLUTION INTRAVENOUS at 07:05

## 2020-05-27 RX ADMIN — MUPIROCIN: 20 OINTMENT TOPICAL at 06:05

## 2020-05-27 RX ADMIN — ACETAMINOPHEN 1000 MG: 500 TABLET, FILM COATED ORAL at 07:05

## 2020-05-27 RX ADMIN — SODIUM CHLORIDE, SODIUM LACTATE, POTASSIUM CHLORIDE, AND CALCIUM CHLORIDE: 600; 310; 30; 20 INJECTION, SOLUTION INTRAVENOUS at 07:05

## 2020-05-27 RX ADMIN — ONDANSETRON 4 MG: 2 INJECTION INTRAMUSCULAR; INTRAVENOUS at 07:05

## 2020-05-27 RX ADMIN — SUCCINYLCHOLINE CHLORIDE 160 MG: 20 INJECTION, SOLUTION INTRAMUSCULAR; INTRAVENOUS at 07:05

## 2020-05-27 NOTE — DISCHARGE INSTRUCTIONS

## 2020-05-27 NOTE — TRANSFER OF CARE
"Anesthesia Transfer of Care Note    Patient: Delmis Tejeda    Procedure(s) Performed: Procedure(s) (LRB):  DILATION AND CURETTAGE, UTERUS, USING SUCTION (N/A)    Patient location: PACU    Anesthesia Type: general    Transport from OR: Transported from OR on 2-3 L/min O2 by NC with adequate spontaneous ventilation    Post pain: adequate analgesia    Post assessment: no apparent anesthetic complications    Post vital signs: stable    Level of consciousness: awake, alert and oriented    Nausea/Vomiting: no nausea/vomiting    Complications: none    Transfer of care protocol was followed      Last vitals:   Visit Vitals  /80 (BP Location: Left arm, Patient Position: Lying)   Pulse 80   Temp 36.6 °C (97.9 °F) (Oral)   Resp 16   Ht 5' 1" (1.549 m)   Wt 104.3 kg (230 lb)   SpO2 98%   Breastfeeding? No   BMI 43.46 kg/m²     "

## 2020-05-27 NOTE — INTERVAL H&P NOTE
The patient has been examined and the H&P has been reviewed:    I concur with the findings and no changes have occurred since H&P was written.       Anesthesia/Surgery risks, benefits and alternative options discussed in full this AM and understood by patient/family.          Active Hospital Problems    Diagnosis  POA    Missed ab [O02.1]  Yes      Resolved Hospital Problems   No resolved problems to display.

## 2020-05-27 NOTE — OP NOTE
[facility]  Operative Note    SUMMARY     Date of Procedure: 2020     Procedure: Procedure(s) (LRB):  DILATION AND CURETTAGE, UTERUS, USING SUCTION (N/A)       Surgeon(s) and Role:     * Sharri Orozco MD - Primary    Pre-Operative Diagnosis: Missed  [O02.1]    Post-Operative Diagnosis: Post-Op Diagnosis Codes:     * Missed  [O02.1]    Anesthesia: General/MAC    Technical Procedures Used: Suction Dilation and Curretage    Description of the Findings of the Procedure: The patient was taken to the operating room where a time out was performed. Anesthesia was then obtained. She was prepped and draped in the normal sterile fashion in the Rolf stirrups in the dorsal lithotomy position. A straight catheter was used to drain her bladder. A speculum was placed in the vagina and the cervix was grasped with a single tooth tenaculum. The Tiffanie dilators were then used to dilate her cervix to a number 10. The number 9 suction currette was used, and moderate products of conception were removed over the course of multiple passes. Sharp curretage was then performed until a gritty texture was felt on the endometrium and the bleeding had subsided. The patient had no active bleeding at the end of the procedure. The single tooth tenaculum was removed. Hemostasis from the tenaculum sites was noted. The speculum was removed from the vagina. The patient tolerated the procedure well. Sponge, lap and needle counts were correct x 2.     Complications: No    Estimated Blood Loss (EBL): * No values recorded between 2020  7:55 AM and 2020  8:12 AM *    Specimens: Products of conception           Condition: Good    Disposition: PACU - hemodynamically stable.    A qualified resident was not available to assist

## 2020-05-27 NOTE — ANESTHESIA PREPROCEDURE EVALUATION
05/27/2020  Delmis Tejeda is a 34 y.o., female.    Anesthesia Evaluation    I have reviewed the Patient Summary Reports.    I have reviewed the Nursing Notes.   I have reviewed the Medications.     Review of Systems  Anesthesia Hx:  Denies Family Hx of Anesthesia complications.   Denies Personal Hx of Anesthesia complications.   Social:  Non-Smoker    Hematology/Oncology:     Oncology Normal    -- Denies Anemia (HCT 45):   EENT/Dental:EENT/Dental Normal   Cardiovascular:  Cardiovascular Normal     Pulmonary:   Denies Asthma.    Renal/:  Renal/ Normal     Hepatic/GI:  Hepatic/GI Normal    Musculoskeletal:  Musculoskeletal Normal    OB/GYN/PEDS:  Missed AB   Neurological:  Neurology Normal    Endocrine:  Endocrine Normal    Dermatological:  Skin Normal    Psych:  Psychiatric Normal           Physical Exam  General:  Morbid Obesity      Dental:  Dental Findings: In tact        Mental Status:  Mental Status Findings:  Cooperative, Alert and Oriented         Anesthesia Plan  Type of Anesthesia, risks & benefits discussed:  Anesthesia Type:  general  Patient's Preference:   Intra-op Monitoring Plan: standard ASA monitors  Intra-op Monitoring Plan Comments:   Post Op Pain Control Plan: multimodal analgesia and per primary service following discharge from PACU  Post Op Pain Control Plan Comments:   Induction:   IV  Beta Blocker:         Informed Consent: Patient understands risks and agrees with Anesthesia plan.  Questions answered. Anesthesia consent signed with patient.  ASA Score: 3     Day of Surgery Review of History & Physical:    H&P update referred to the surgeon.         Ready For Surgery From Anesthesia Perspective.

## 2020-05-27 NOTE — OR NURSING
Notified Leah in pathology pt request for the hospital to take care of the specimen and does not choose respectful or private burial.

## 2020-05-27 NOTE — PLAN OF CARE
[facility]. Vital Signs are stable, ambulating  without difficulty. Discharge instructions given, patient verbalized understanding. Discharged from facility via wheelchair in stable condition.

## 2020-05-27 NOTE — ANESTHESIA POSTPROCEDURE EVALUATION
Anesthesia Post Evaluation    Patient: Delmis Tejeda    Procedure(s) Performed: Procedure(s) (LRB):  DILATION AND CURETTAGE, UTERUS, USING SUCTION (N/A)    Final Anesthesia Type: general    Patient location during evaluation: PACU  Patient participation: Yes- Able to Participate  Level of consciousness: awake and alert  Post-procedure vital signs: reviewed and stable  Pain management: adequate  Airway patency: patent    PONV status at discharge: No PONV  Anesthetic complications: no      Cardiovascular status: blood pressure returned to baseline  Respiratory status: unassisted and spontaneous ventilation  Hydration status: euvolemic  Follow-up not needed.          Vitals Value Taken Time   /62 5/27/2020  8:45 AM   Temp 37.4 °C (99.3 °F) 5/27/2020  8:45 AM   Pulse 66 5/27/2020  8:45 AM   Resp 16 5/27/2020  8:45 AM   SpO2 100 % 5/27/2020  8:45 AM         Event Time     Out of Recovery 08:40:14          Pain/Quan Score: Pain Rating Prior to Med Admin: 0 (5/27/2020  7:26 AM)  Quan Score: 10 (5/27/2020  8:45 AM)

## 2020-05-27 NOTE — DISCHARGE SUMMARY
[facility]  Discharge Summary  OBGYN    Admission date: 5/27/2020  Discharge date: 05/27/2020    Admit Dx:      Patient Active Problem List   Diagnosis    Missed ab     Discharge Dx:    Patient Active Problem List   Diagnosis    Missed ab       Attending Physician: Sharri Orozco   Discharge Provider: Sharri Orozco    Procedures Performed: Procedure(s) (LRB):  DILATION AND CURETTAGE, UTERUS, USING SUCTION (N/A)    Hospital Course: Patient was admitted on 5/27/2020 to undergo suction D&C secondary to missed AB.  Procedure was uncomplicated, for full details see operative report. Barring any unforseen incidents, patient will be discharged home when she is able to ambulate, void, and tolerate PO intake.    Consults: none    Significant Diagnostic Studies:   Lab Results   Component Value Date    WBC 8.69 05/19/2020    HGB 14.0 05/19/2020    HCT 45.1 05/19/2020    MCV 94 05/19/2020     05/19/2020     No image results found.      Discharged Condition: stable    Disposition: Home    Follow Up:   Follow-up Information     Sasha Ruiz MD In 2 weeks.    Specialties:  Obstetrics and Gynecology, Gynecology, Obstetrics  Why:  For Routine Post Operative Care  Contact information:  [address]  [address]  [facility] [address] [phone]                   Medications:  Current Discharge Medication List      START taking these medications    Details   ibuprofen (ADVIL,MOTRIN) 800 MG tablet Take 1 tablet (800 mg total) by mouth every 8 (eight) hours as needed for Pain.  Qty: 20 tablet, Refills: 0      oxyCODONE-acetaminophen (PERCOCET) 5-325 mg per tablet Take 1 tablet by mouth every 4 (four) hours as needed for Pain.  Qty: 5 tablet, Refills: 0    Comments: Quantity prescribed more than 7 day supply? No         CONTINUE these medications which have NOT CHANGED    Details   PNV no.95/ferrous fum/folic ac (PRENATAL ORAL) Take 1 capsule by mouth once daily.      valACYclovir  (VALTREX) 1000 MG tablet TAKE ONE-HALF TABLET BY MOUTH TWICE DAILY  Qty: 10 tablet, Refills: 2    Comments: Please consider 90 day supplies to promote better adherence             Discharge Procedure Orders   Diet general     Diet Adult Regular     Pelvic Rest   Order Comments: Nothing in the vagina for 2 weeks     Call MD for:  temperature >100.4     Call MD for:  persistent nausea and vomiting     Call MD for:  severe uncontrolled pain     Call MD for:   Order Comments: Vaginal bleeding >2pads/hour for >2 hours

## 2020-05-29 LAB — FINAL PATHOLOGIC DIAGNOSIS: NORMAL

## 2020-06-09 ENCOUNTER — TELEPHONE (OUTPATIENT)
Dept: OBSTETRICS AND GYNECOLOGY | Facility: CLINIC | Age: 35
End: 2020-06-09

## 2020-06-09 ENCOUNTER — OFFICE VISIT (OUTPATIENT)
Dept: OBSTETRICS AND GYNECOLOGY | Facility: CLINIC | Age: 35
End: 2020-06-09
Payer: COMMERCIAL

## 2020-06-09 ENCOUNTER — LAB VISIT (OUTPATIENT)
Dept: LAB | Facility: HOSPITAL | Age: 35
End: 2020-06-09
Attending: OBSTETRICS & GYNECOLOGY
Payer: COMMERCIAL

## 2020-06-09 VITALS
HEIGHT: 61 IN | BODY MASS INDEX: 46.33 KG/M2 | DIASTOLIC BLOOD PRESSURE: 70 MMHG | SYSTOLIC BLOOD PRESSURE: 110 MMHG | TEMPERATURE: 98 F | WEIGHT: 245.38 LBS

## 2020-06-09 DIAGNOSIS — Z00.00 WELLNESS EXAMINATION: ICD-10-CM

## 2020-06-09 DIAGNOSIS — Z98.890 S/P D&C (STATUS POST DILATION AND CURETTAGE): Primary | ICD-10-CM

## 2020-06-09 LAB
25(OH)D3+25(OH)D2 SERPL-MCNC: 24 NG/ML (ref 30–96)
TSH SERPL DL<=0.005 MIU/L-ACNC: 1.42 UIU/ML (ref 0.4–4)

## 2020-06-09 PROCEDURE — 84443 ASSAY THYROID STIM HORMONE: CPT

## 2020-06-09 PROCEDURE — 99024 POSTOP FOLLOW-UP VISIT: CPT | Mod: S$GLB,,, | Performed by: OBSTETRICS & GYNECOLOGY

## 2020-06-09 PROCEDURE — 82306 VITAMIN D 25 HYDROXY: CPT

## 2020-06-09 PROCEDURE — 99999 PR PBB SHADOW E&M-EST. PATIENT-LVL III: ICD-10-PCS | Mod: PBBFAC,,, | Performed by: OBSTETRICS & GYNECOLOGY

## 2020-06-09 PROCEDURE — 3008F PR BODY MASS INDEX (BMI) DOCUMENTED: ICD-10-PCS | Mod: CPTII,S$GLB,, | Performed by: OBSTETRICS & GYNECOLOGY

## 2020-06-09 PROCEDURE — 3008F BODY MASS INDEX DOCD: CPT | Mod: CPTII,S$GLB,, | Performed by: OBSTETRICS & GYNECOLOGY

## 2020-06-09 PROCEDURE — 99024 PR POST-OP FOLLOW-UP VISIT: ICD-10-PCS | Mod: S$GLB,,, | Performed by: OBSTETRICS & GYNECOLOGY

## 2020-06-09 PROCEDURE — 83036 HEMOGLOBIN GLYCOSYLATED A1C: CPT

## 2020-06-09 PROCEDURE — 99999 PR PBB SHADOW E&M-EST. PATIENT-LVL III: CPT | Mod: PBBFAC,,, | Performed by: OBSTETRICS & GYNECOLOGY

## 2020-06-09 RX ORDER — VALACYCLOVIR HYDROCHLORIDE 500 MG/1
500 TABLET, FILM COATED ORAL 2 TIMES DAILY
Qty: 60 TABLET | Refills: 0 | Status: SHIPPED | OUTPATIENT
Start: 2020-06-09 | End: 2021-04-21

## 2020-06-09 NOTE — PROGRESS NOTES
Subjective:       Patient ID: Delmis Tejeda is a 34 y.o. female.    Chief Complaint:  S/p sab and suction d and c    History of Present Illness  34 year old here after sab and d and c.  Doing well overall reports bleeding and cramping was minimal.  Discussed fertility plans and open to moving forward.  Discussed waiting one period.  Moods stable.  Reports hsv outbreak after d and c and valtrex sent in.  Discussed monitoring periods.    No current pain or vaginal bleeding.      GYN & OB History  No LMP recorded.   Date of Last Pap: 10/22/2019    OB History    Para Term  AB Living   0 0 0 0 0 0   SAB TAB Ectopic Multiple Live Births   0 0 0 0 0   Obstetric Comments   Menarche 89       Past Medical History:   Diagnosis Date    Asthma     Herpes simplex virus (HSV) infection     History of gonorrhea     History of trichomoniasis 10/2018       Past Surgical History:   Procedure Laterality Date    DILATION AND CURETTAGE OF UTERUS USING SUCTION N/A 2020    Procedure: DILATION AND CURETTAGE, UTERUS, USING SUCTION;  Surgeon: Sharri Orozco MD;  Location: [facility];  Service: OB/GYN;  Laterality: N/A;    MYRINGOTOMY W/ TUBES         Review of Systems  Review of Systems   Constitutional: Negative for fatigue and fever.   Gastrointestinal: Negative for abdominal pain.   Genitourinary: Negative for dysuria, menstrual problem, pelvic pain, vaginal bleeding and vaginal discharge.   Psychiatric/Behavioral: Negative for dysphoric mood.        Objective:   Physical Exam:   Constitutional: She appears well-developed.             Abdominal: Soft.     Genitourinary: Vagina normal and uterus normal.                 Psychiatric: She has a normal mood and affect.        Assessment/ Plan:     S/P D&C (status post dilation and curettage)    Wellness examination  -     VITAMIN D; Future; Expected date: 2020  -     TSH; Future; Expected date: 2020  -     Hemoglobin A1C;  Future    Other orders  -     valACYclovir (VALTREX) 500 MG tablet; Take 1 tablet (500 mg total) by mouth 2 (two) times daily.  Dispense: 60 tablet; Refill: 0    open to pregnancy moving forward will not return to pills   Discussed small 2 cm posterior fibroid      No follow-ups on file.    Patient was counseled today on A.C.S. Pap guidelines and recommendations for yearly pelvic exams, mammograms and monthly self breast exams; to see her PCP for other health maintenance.

## 2020-06-10 LAB
ESTIMATED AVG GLUCOSE: 146 MG/DL (ref 68–131)
HBA1C MFR BLD HPLC: 6.7 % (ref 4–5.6)

## 2020-06-15 ENCOUNTER — OFFICE VISIT (OUTPATIENT)
Dept: URGENT CARE | Facility: CLINIC | Age: 35
End: 2020-06-15
Payer: COMMERCIAL

## 2020-06-15 VITALS
BODY MASS INDEX: 46.26 KG/M2 | TEMPERATURE: 99 F | WEIGHT: 245 LBS | HEIGHT: 61 IN | OXYGEN SATURATION: 98 % | HEART RATE: 82 BPM | RESPIRATION RATE: 10 BRPM

## 2020-06-15 DIAGNOSIS — Z11.59 SCREENING FOR VIRAL DISEASE: Primary | ICD-10-CM

## 2020-06-15 DIAGNOSIS — Z71.89 EDUCATED ABOUT COVID-19 VIRUS INFECTION: ICD-10-CM

## 2020-06-15 PROCEDURE — 99214 OFFICE O/P EST MOD 30 MIN: CPT | Mod: S$GLB,,, | Performed by: NURSE PRACTITIONER

## 2020-06-15 PROCEDURE — U0003 INFECTIOUS AGENT DETECTION BY NUCLEIC ACID (DNA OR RNA); SEVERE ACUTE RESPIRATORY SYNDROME CORONAVIRUS 2 (SARS-COV-2) (CORONAVIRUS DISEASE [COVID-19]), AMPLIFIED PROBE TECHNIQUE, MAKING USE OF HIGH THROUGHPUT TECHNOLOGIES AS DESCRIBED BY CMS-2020-01-R: HCPCS

## 2020-06-15 PROCEDURE — 99214 PR OFFICE/OUTPT VISIT, EST, LEVL IV, 30-39 MIN: ICD-10-PCS | Mod: S$GLB,,, | Performed by: NURSE PRACTITIONER

## 2020-06-16 NOTE — PROGRESS NOTES
"Subjective:       Patient ID: Delmis Tejeda is a 34 y.o. female.    Vitals:  height is 5' 1" (1.549 m) and weight is 111.1 kg (245 lb). Her tympanic temperature is 98.7 °F (37.1 °C). Her pulse is 82. Her respiration is 10 and oxygen saturation is 98%.     Chief Complaint: COVID-19 Concerns and Cough    34 yr old female presenting to the Urgent Care with complaint of cough and loss of taste and smell x 1 week.     Cough  This is a new problem. The current episode started in the past 7 days. The problem has been gradually worsening. The problem occurs constantly. The cough is productive of sputum. Pertinent negatives include no chills, ear pain, eye redness, fever, hemoptysis, myalgias, rash, sore throat, shortness of breath or wheezing. Nothing aggravates the symptoms. She has tried OTC cough suppressant for the symptoms.       Constitution: Negative for chills, sweating, fatigue and fever.   HENT: Negative for ear pain, congestion, sinus pain, sinus pressure, sore throat and voice change.    Neck: Negative for painful lymph nodes.   Eyes: Negative for eye redness.   Respiratory: Positive for cough. Negative for chest tightness, sputum production, bloody sputum, COPD, shortness of breath, stridor, wheezing and asthma.    Gastrointestinal: Negative for nausea and vomiting.   Musculoskeletal: Negative for muscle ache.   Skin: Negative for rash.   Allergic/Immunologic: Negative for seasonal allergies and asthma.   Hematologic/Lymphatic: Negative for swollen lymph nodes.       Objective:      Physical Exam   Patient was seen remotely due to State of Emergency for the COVID-19 outbreak. Patient spoke clearly on the phone. Patient does not appear to be in distress via remote visit. ER precautions discussed. Patient verbalized understanding.         Assessment:       1. Screening for viral disease    2. Educated About Covid-19 Virus Infection        Plan:         Screening for viral disease    Educated " About Covid-19 Virus Infection      Patient Instructions   Someone will call you from [facility] within 24-48 hours to discuss lab results.       Instructions for Patients with Confirmed or Suspected COVID-19    If you are awaiting your test result, you will either be called or it will be released to the patient portal.  If you have any questions about your test, please visit [email or web address] or call our COVID-19 information line at [phone].       Stay home and stay away from family members and friends. The CDC says, you can leave home after these three things have happened: 1) You have had no fever for at least 72 hours (that is three full days of no fever without the use of medicine that reduces fevers) 2) AND other symptoms have improved (for example, when your cough or shortness of breath have improved) 3) AND at least 7 days have passed since your symptoms first appeared.   Separate yourself from other people and animals in your home.   Call ahead before visiting your doctor.   Wear a facemask.   Cover your coughs and sneezes.   Wash your hands often with soap and water; hand  can be used, too.   Avoid sharing personal household items.   Wipe down surfaces used daily.   Monitor your symptoms. Seek prompt medical attention if your illness is worsening (e.g., difficulty breathing).    Before seeking care, call your healthcare provider.   If you have a medical emergency and need to call 911, notify the dispatch personnel that you have, or are being evaluated for COVID-19. If possible, put on a facemask before emergency medical services arrive.        Recommended precautions for household members, intimate partners, and caregivers in a home setting of a patient with symptomatic laboratory-confirmed COVID-19 or a patient under investigation.  Household members, intimate partners, and caregivers in the home setting awaiting tests results have close contact with a person with  symptomatic, laboratory-confirmed COVID-19 or a person under investigation. Close contacts should monitor their health; they should call their provider right away if they develop symptoms suggestive of COVID-19 (e.g., fever, cough, shortness of breath).    Close contacts should also follow these recommendations:   Make sure that you understand and can help the patient follow their provider's instructions for medication(s) and care. You should help the patient with basic needs in the home and provide support for getting groceries, prescriptions, and other personal needs.   Monitor the patient's symptoms. If the patient is getting sicker, call his or her healthcare provider and tell them that the patient has laboratory-confirmed COVID-19. If the patient has a medical emergency and you need to call 911, notify the dispatch personnel that the patient has, or is being evaluated for COVID-19.   Household members should stay in another room or be  from the patient. Household members should use a separate bedroom and bathroom, if available.   Prohibit visitors.   Household members should care for any pets in the home.   Make sure that shared spaces in the home have good air flow, such as by an air conditioner or an opened window, weather permitting.   Perform hand hygiene frequently. Wash your hands often with soap and water for at least 20 seconds or use an alcohol-based hand  (that contains > 60% alcohol) covering all surfaces of your hands and rubbing them together until they feel dry. Soap and water should be used preferentially.   Avoid touching your eyes, nose, and mouth.   The patient should wear a facemask. If the patient is not able to wear a facemask (for example, because it causes trouble breathing), caregivers should wear a mask when they are in the same room as the patient.   Wear a disposable facemask and gloves when you touch or have contact with the patient's blood, stool, or body  fluids, such as saliva, sputum, nasal mucus, vomit, urine.  o Throw out disposable facemasks and gloves after using them. Do not reuse.  o When removing personal protective equipment, first remove and dispose of gloves. Then, immediately clean your hands with soap and water or alcohol-based hand . Next, remove and dispose of facemask, and immediately clean your hands again with soap and water or alcohol-based hand .   You should not share dishes, drinking glasses, cups, eating utensils, towels, bedding, or other items with the patient. After the patient uses these items, you should wash them thoroughly (see below Wash laundry thoroughly).   Clean all high-touch surfaces, such as counters, tabletops, doorknobs, bathroom fixtures, toilets, phones, keyboards, tablets, and bedside tables, every day. Also, clean any surfaces that may have blood, stool, or body fluids on them.   Use a household cleaning spray or wipe, according to the label instructions. Labels contain instructions for safe and effective use of the cleaning product including precautions you should take when applying the product, such as wearing gloves and making sure you have good ventilation during use of the product.   Wash laundry thoroughly.  o Immediately remove and wash clothes or bedding that have blood, stool, or body fluids on them.  o Wear disposable gloves while handling soiled items and keep soiled items away from your body. Clean your hands (with soap and water or an alcohol-based hand ) immediately after removing your gloves.  o Read and follow directions on labels of laundry or clothing items and detergent. In general, using a normal laundry detergent according to washing machine instructions and dry thoroughly using the warmest temperatures recommended on the clothing label.   Place all used disposable gloves, facemasks, and other contaminated items in a lined container before disposing of them with  other household waste. Clean your hands (with soap and water or an alcohol-based hand ) immediately after handling these items. Soap and water should be used preferentially if hands are visibly dirty.   Discuss any additional questions with your state or local health department or healthcare provider. Check available hours when contacting your local health department.    For more information see CDC link below.      https://www.cdc.gov/coronavirus/2019-ncov/hcp/guidance-prevent-spread.html#precautions        Sources:  CDC, [place] Department of Health and [facility]    If you were prescribed a narcotic or controlled medication, do not drive or operate heavy equipment or machinery while taking these medications.  You must understand that you've received an Urgent Care treatment only and that you may be released before all your medical problems are known or treated. You, the patient, will arrange for follow up care as instructed.  Follow up with your PCP or specialty clinic as directed within 2-5 days if not improved or as needed.  You can call [phone] to schedule an appointment with the appropriate provider.  If your condition worsens we recommend that you receive another evaluation at the emergency room immediately or contact your primary medical clinics after hours call service to discuss your concerns.  Please return here or go to the Emergency Department for any concerns or worsening of condition.

## 2020-06-16 NOTE — PATIENT INSTRUCTIONS
Someone will call you from [facility] within 24-48 hours to discuss lab results.       Instructions for Patients with Confirmed or Suspected COVID-19    If you are awaiting your test result, you will either be called or it will be released to the patient portal.  If you have any questions about your test, please visit [email or web address] or call our COVID-19 information line at [phone].       Stay home and stay away from family members and friends. The CDC says, you can leave home after these three things have happened: 1) You have had no fever for at least 72 hours (that is three full days of no fever without the use of medicine that reduces fevers) 2) AND other symptoms have improved (for example, when your cough or shortness of breath have improved) 3) AND at least 7 days have passed since your symptoms first appeared.   Separate yourself from other people and animals in your home.   Call ahead before visiting your doctor.   Wear a facemask.   Cover your coughs and sneezes.   Wash your hands often with soap and water; hand  can be used, too.   Avoid sharing personal household items.   Wipe down surfaces used daily.   Monitor your symptoms. Seek prompt medical attention if your illness is worsening (e.g., difficulty breathing).    Before seeking care, call your healthcare provider.   If you have a medical emergency and need to call 911, notify the dispatch personnel that you have, or are being evaluated for COVID-19. If possible, put on a facemask before emergency medical services arrive.        Recommended precautions for household members, intimate partners, and caregivers in a home setting of a patient with symptomatic laboratory-confirmed COVID-19 or a patient under investigation.  Household members, intimate partners, and caregivers in the home setting awaiting tests results have close contact with a person with symptomatic, laboratory-confirmed COVID-19 or a person under  investigation. Close contacts should monitor their health; they should call their provider right away if they develop symptoms suggestive of COVID-19 (e.g., fever, cough, shortness of breath).    Close contacts should also follow these recommendations:   Make sure that you understand and can help the patient follow their provider's instructions for medication(s) and care. You should help the patient with basic needs in the home and provide support for getting groceries, prescriptions, and other personal needs.   Monitor the patient's symptoms. If the patient is getting sicker, call his or her healthcare provider and tell them that the patient has laboratory-confirmed COVID-19. If the patient has a medical emergency and you need to call 911, notify the dispatch personnel that the patient has, or is being evaluated for COVID-19.   Household members should stay in another room or be  from the patient. Household members should use a separate bedroom and bathroom, if available.   Prohibit visitors.   Household members should care for any pets in the home.   Make sure that shared spaces in the home have good air flow, such as by an air conditioner or an opened window, weather permitting.   Perform hand hygiene frequently. Wash your hands often with soap and water for at least 20 seconds or use an alcohol-based hand  (that contains > 60% alcohol) covering all surfaces of your hands and rubbing them together until they feel dry. Soap and water should be used preferentially.   Avoid touching your eyes, nose, and mouth.   The patient should wear a facemask. If the patient is not able to wear a facemask (for example, because it causes trouble breathing), caregivers should wear a mask when they are in the same room as the patient.   Wear a disposable facemask and gloves when you touch or have contact with the patient's blood, stool, or body fluids, such as saliva, sputum, nasal mucus, vomit,  urine.  o Throw out disposable facemasks and gloves after using them. Do not reuse.  o When removing personal protective equipment, first remove and dispose of gloves. Then, immediately clean your hands with soap and water or alcohol-based hand . Next, remove and dispose of facemask, and immediately clean your hands again with soap and water or alcohol-based hand .   You should not share dishes, drinking glasses, cups, eating utensils, towels, bedding, or other items with the patient. After the patient uses these items, you should wash them thoroughly (see below Wash laundry thoroughly).   Clean all high-touch surfaces, such as counters, tabletops, doorknobs, bathroom fixtures, toilets, phones, keyboards, tablets, and bedside tables, every day. Also, clean any surfaces that may have blood, stool, or body fluids on them.   Use a household cleaning spray or wipe, according to the label instructions. Labels contain instructions for safe and effective use of the cleaning product including precautions you should take when applying the product, such as wearing gloves and making sure you have good ventilation during use of the product.   Wash laundry thoroughly.  o Immediately remove and wash clothes or bedding that have blood, stool, or body fluids on them.  o Wear disposable gloves while handling soiled items and keep soiled items away from your body. Clean your hands (with soap and water or an alcohol-based hand ) immediately after removing your gloves.  o Read and follow directions on labels of laundry or clothing items and detergent. In general, using a normal laundry detergent according to washing machine instructions and dry thoroughly using the warmest temperatures recommended on the clothing label.   Place all used disposable gloves, facemasks, and other contaminated items in a lined container before disposing of them with other household waste. Clean your hands (with soap and  water or an alcohol-based hand ) immediately after handling these items. Soap and water should be used preferentially if hands are visibly dirty.   Discuss any additional questions with your state or local health department or healthcare provider. Check available hours when contacting your local health department.    For more information see CDC link below.      https://www.cdc.gov/coronavirus/2019-ncov/hcp/guidance-prevent-spread.html#precautions        Sources:  CDC, [place] Department of Health and [facility]    If you were prescribed a narcotic or controlled medication, do not drive or operate heavy equipment or machinery while taking these medications.  You must understand that you've received an Urgent Care treatment only and that you may be released before all your medical problems are known or treated. You, the patient, will arrange for follow up care as instructed.  Follow up with your PCP or specialty clinic as directed within 2-5 days if not improved or as needed.  You can call [phone] to schedule an appointment with the appropriate provider.  If your condition worsens we recommend that you receive another evaluation at the emergency room immediately or contact your primary medical clinics after hours call service to discuss your concerns.  Please return here or go to the Emergency Department for any concerns or worsening of condition.

## 2020-06-17 ENCOUNTER — TELEPHONE (OUTPATIENT)
Dept: URGENT CARE | Facility: CLINIC | Age: 35
End: 2020-06-17

## 2020-06-17 DIAGNOSIS — U07.1 COVID-19 VIRUS DETECTED: ICD-10-CM

## 2020-06-17 LAB — SARS-COV-2 RNA RESP QL NAA+PROBE: DETECTED

## 2020-06-17 NOTE — TELEPHONE ENCOUNTER
Discussed positive COVID with patient.  No worsening of symptoms, no difficulty breathing.  Discussed ED precautions.  And primary care follow-up.    All questions answered

## 2020-06-30 ENCOUNTER — TELEPHONE (OUTPATIENT)
Dept: OBSTETRICS AND GYNECOLOGY | Facility: CLINIC | Age: 35
End: 2020-06-30

## 2020-06-30 NOTE — TELEPHONE ENCOUNTER
I spoke with patient. She tested positive for co vid on 06/15/20 and needs a repeat co vid test to go back to work or doctor's note to go back to work. Patient advised we cannot give her a return to work note and she can go to any co vid testing site. Patient verbalized understanding.

## 2020-07-10 ENCOUNTER — OFFICE VISIT (OUTPATIENT)
Dept: FAMILY MEDICINE | Facility: CLINIC | Age: 35
End: 2020-07-10
Payer: COMMERCIAL

## 2020-07-10 VITALS
WEIGHT: 240.88 LBS | BODY MASS INDEX: 45.51 KG/M2 | OXYGEN SATURATION: 99 % | SYSTOLIC BLOOD PRESSURE: 120 MMHG | TEMPERATURE: 97 F | DIASTOLIC BLOOD PRESSURE: 82 MMHG | HEART RATE: 75 BPM

## 2020-07-10 DIAGNOSIS — E55.9 VITAMIN D DEFICIENCY: ICD-10-CM

## 2020-07-10 DIAGNOSIS — R30.0 DYSURIA: ICD-10-CM

## 2020-07-10 DIAGNOSIS — Z23 NEED FOR TDAP VACCINATION: ICD-10-CM

## 2020-07-10 DIAGNOSIS — R73.03 PREDIABETES: Primary | ICD-10-CM

## 2020-07-10 DIAGNOSIS — E66.01 CLASS 3 SEVERE OBESITY DUE TO EXCESS CALORIES WITH SERIOUS COMORBIDITY AND BODY MASS INDEX (BMI) OF 45.0 TO 49.9 IN ADULT: ICD-10-CM

## 2020-07-10 PROBLEM — E66.813 CLASS 3 SEVERE OBESITY DUE TO EXCESS CALORIES WITH SERIOUS COMORBIDITY AND BODY MASS INDEX (BMI) OF 45.0 TO 49.9 IN ADULT: Status: ACTIVE | Noted: 2020-07-10

## 2020-07-10 PROCEDURE — 3008F PR BODY MASS INDEX (BMI) DOCUMENTED: ICD-10-PCS | Mod: CPTII,S$GLB,, | Performed by: INTERNAL MEDICINE

## 2020-07-10 PROCEDURE — 90715 TDAP VACCINE 7 YRS/> IM: CPT | Mod: S$GLB,,, | Performed by: INTERNAL MEDICINE

## 2020-07-10 PROCEDURE — 99999 PR PBB SHADOW E&M-EST. PATIENT-LVL III: ICD-10-PCS | Mod: PBBFAC,,, | Performed by: INTERNAL MEDICINE

## 2020-07-10 PROCEDURE — 99214 OFFICE O/P EST MOD 30 MIN: CPT | Mod: 25,S$GLB,, | Performed by: INTERNAL MEDICINE

## 2020-07-10 PROCEDURE — 99999 PR PBB SHADOW E&M-EST. PATIENT-LVL III: CPT | Mod: PBBFAC,,, | Performed by: INTERNAL MEDICINE

## 2020-07-10 PROCEDURE — 90715 TDAP VACCINE GREATER THAN OR EQUAL TO 7YO IM: ICD-10-PCS | Mod: S$GLB,,, | Performed by: INTERNAL MEDICINE

## 2020-07-10 PROCEDURE — 90471 TDAP VACCINE GREATER THAN OR EQUAL TO 7YO IM: ICD-10-PCS | Mod: S$GLB,,, | Performed by: INTERNAL MEDICINE

## 2020-07-10 PROCEDURE — 3008F BODY MASS INDEX DOCD: CPT | Mod: CPTII,S$GLB,, | Performed by: INTERNAL MEDICINE

## 2020-07-10 PROCEDURE — 90471 IMMUNIZATION ADMIN: CPT | Mod: S$GLB,,, | Performed by: INTERNAL MEDICINE

## 2020-07-10 PROCEDURE — 99214 PR OFFICE/OUTPT VISIT, EST, LEVL IV, 30-39 MIN: ICD-10-PCS | Mod: 25,S$GLB,, | Performed by: INTERNAL MEDICINE

## 2020-07-10 RX ORDER — ERGOCALCIFEROL 1.25 MG/1
50000 CAPSULE ORAL
Qty: 12 CAPSULE | Refills: 0 | Status: SHIPPED | OUTPATIENT
Start: 2020-07-10 | End: 2021-08-27

## 2020-07-10 NOTE — PROGRESS NOTES
[facility] Note    Chief Complaint      Chief Complaint   Patient presents with    Establish Care     History of Present Illness      Delmis Tejeda is a 34 y.o. female who presents today to establish care for vitamin D deficiency, prediabetes.  Patient comes to appointment alone.     Had some pressure, urinary frequency several days ago.  Problem List Items Addressed This Visit     Vitamin D deficiency    Current Assessment & Plan     Level 24 in 6/2020         Relevant Medications    ergocalciferol (ERGOCALCIFEROL) 50,000 unit Cap    Other Relevant Orders    Vitamin D    Class 3 severe obesity due to excess calories with serious comorbidity and body mass index (BMI) of 45.0 to 49.9 in adult    Relevant Orders    Lipid Panel    Prediabetes - Primary    Current Assessment & Plan     A1C 6.7 in 6/2020, has been eating terribly.  Has had success with WW in past.  Not exercises, was doing Darrius.         Relevant Orders    Hemoglobin A1C      Other Visit Diagnoses     Need for Tdap vaccination        Relevant Orders    (In Office Administered) Tdap Vaccine    Dysuria        Relevant Orders    URINALYSIS    Urine culture          Health Maintenance   Topic Date Due    Lipid Panel  1983    TETANUS VACCINE  08/09/2001       Past Medical History:   Diagnosis Date    Asthma     Herpes simplex virus (HSV) infection     History of gonorrhea     History of trichomoniasis 10/2018       Past Surgical History:   Procedure Laterality Date    DILATION AND CURETTAGE OF UTERUS USING SUCTION N/A 5/27/2020    Procedure: DILATION AND CURETTAGE, UTERUS, USING SUCTION;  Surgeon: Sharri Orozco MD;  Location: [facility];  Service: OB/GYN;  Laterality: N/A;    MYRINGOTOMY W/ TUBES  1984       family history includes Diabetes in her father; Hypertension in her mother.    Social History     Tobacco Use    Smoking status: Never Smoker    Smokeless tobacco: Never Used   Substance  Use Topics    Alcohol use: Yes    Drug use: No       Review of Systems   Constitutional: Negative for chills and fever.   HENT: Negative for congestion and sore throat.    Eyes: Negative for blurred vision and discharge.   Respiratory: Negative for cough and shortness of breath.    Cardiovascular: Negative for chest pain and palpitations.   Gastrointestinal: Negative for constipation, diarrhea, nausea and vomiting.   Genitourinary: Negative for dysuria and hematuria.   Musculoskeletal: Negative for falls and myalgias.   Skin: Negative for itching and rash.   Neurological: Negative for dizziness and headaches.        Outpatient Encounter Medications as of 7/10/2020   Medication Sig Dispense Refill    PNV no.95/ferrous fum/folic ac (PRENATAL ORAL) Take 1 capsule by mouth once daily.      valACYclovir (VALTREX) 500 MG tablet Take 1 tablet (500 mg total) by mouth 2 (two) times daily. 60 tablet 0    ergocalciferol (ERGOCALCIFEROL) 50,000 unit Cap Take 1 capsule (50,000 Units total) by mouth every 7 days. 12 capsule 0    [DISCONTINUED] ibuprofen (ADVIL,MOTRIN) 800 MG tablet Take 1 tablet (800 mg total) by mouth every 8 (eight) hours as needed for Pain. (Patient not taking: Reported on 7/10/2020) 20 tablet 0    [DISCONTINUED] oxyCODONE-acetaminophen (PERCOCET) 5-325 mg per tablet Take 1 tablet by mouth every 4 (four) hours as needed for Pain. (Patient not taking: Reported on 7/10/2020) 5 tablet 0     No facility-administered encounter medications on file as of 7/10/2020.         Review of patient's allergies indicates:  No Known Allergies    Physical Exam      Vital Signs  Temp: 97.1 °F (36.2 °C)  Temp src: Oral  Pulse: 75  SpO2: 99 %  BP: 120/82  BP Location: Left arm  Patient Position: Sitting  Pain Score: 0-No pain  Height and Weight  Weight: 109.2 kg (240 lb 13.6 oz)]  Body mass index is 45.51 kg/m².    Physical Exam  Constitutional:       Appearance: She is well-developed.   HENT:      Head: Normocephalic and  atraumatic.      Right Ear: External ear normal.      Left Ear: External ear normal.   Eyes:      General:         Right eye: No discharge.         Left eye: No discharge.   Neck:      Musculoskeletal: Normal range of motion.      Thyroid: No thyromegaly.   Cardiovascular:      Rate and Rhythm: Normal rate and regular rhythm.      Heart sounds: Normal heart sounds. No murmur.   Pulmonary:      Effort: Pulmonary effort is normal. No respiratory distress.      Breath sounds: Normal breath sounds.   Abdominal:      General: Bowel sounds are normal. There is no distension.      Palpations: Abdomen is soft.      Tenderness: There is no abdominal tenderness.   Musculoskeletal: Normal range of motion.         General: No deformity.   Skin:     General: Skin is warm and dry.      Findings: No rash.   Neurological:      Mental Status: She is alert and oriented to person, place, and time.   Psychiatric:         Behavior: Behavior normal.          Laboratory:  CBC:  Recent Labs   Lab Result Units 05/19/20  1200   WBC K/uL 8.69   RBC M/uL 4.78   Hemoglobin g/dL 14.0   Hematocrit % 45.1   Platelets K/uL 322   Mean Corpuscular Volume fL 94   Mean Corpuscular Hemoglobin pg 29.3   Mean Corpuscular Hemoglobin Conc g/dL 31.0*     CMP:  No results for input(s): GLU, CALCIUM, ALBUMIN, PROT, NA, K, CO2, CL, BUN, ALKPHOS, ALT, AST, BILITOT in the last 2160 hours.    Invalid input(s): CREATININ  URINALYSIS:  No results for input(s): COLORU, CLARITYU, SPECGRAV, PHUR, PROTEINUA, GLUCOSEU, BILIRUBINCON, BLOODU, WBCU, RBCU, BACTERIA, MUCUS, NITRITE, LEUKOCYTESUR, UROBILINOGEN, HYALINECASTS in the last 2160 hours.   LIPIDS:  Recent Labs   Lab Result Units 06/09/20  1712   TSH uIU/mL 1.416     TSH:  Recent Labs   Lab Result Units 06/09/20  1712   TSH uIU/mL 1.416     A1C:  Recent Labs   Lab Result Units 06/09/20  1713   Hemoglobin A1C % 6.7*       Radiology:  No imaging on file    Assessment/Plan     Delmis Tejead is a 34  y.o.female with:    1. Prediabetes  - Hemoglobin A1C; Future    2. Vitamin D deficiency  - ergocalciferol (ERGOCALCIFEROL) 50,000 unit Cap; Take 1 capsule (50,000 Units total) by mouth every 7 days.  Dispense: 12 capsule; Refill: 0  - Vitamin D; Future    3. Class 3 severe obesity due to excess calories with serious comorbidity and body mass index (BMI) of 45.0 to 49.9 in adult  - Lipid Panel; Future    4. Need for Tdap vaccination  - (In Office Administered) Tdap Vaccine    5. Dysuria  - URINALYSIS; Future  - Urine culture; Future      -Counseled on increasing exercise, low carb  -start Vit D supplementation  -UA today to R/O dysuria  -TDap today  -Continue current medications and maintain follow up with specialists.  Return to clinic in 6 months.      Michelle Tucker MD  [facility]

## 2020-07-10 NOTE — ASSESSMENT & PLAN NOTE
A1C 6.7 in 6/2020, has been eating terribly.  Has had success with WW in past.  Not exercises, was doing Darrius.

## 2020-10-16 ENCOUNTER — PATIENT MESSAGE (OUTPATIENT)
Dept: FAMILY MEDICINE | Facility: CLINIC | Age: 35
End: 2020-10-16

## 2020-10-16 DIAGNOSIS — E55.9 VITAMIN D DEFICIENCY: Primary | ICD-10-CM

## 2020-10-16 DIAGNOSIS — R73.03 PREDIABETES: ICD-10-CM

## 2020-11-27 ENCOUNTER — TELEPHONE (OUTPATIENT)
Dept: OBSTETRICS AND GYNECOLOGY | Facility: CLINIC | Age: 35
End: 2020-11-27

## 2020-11-27 DIAGNOSIS — N92.6 MISSED MENSES: Primary | ICD-10-CM

## 2020-11-27 NOTE — TELEPHONE ENCOUNTER
Jannet PAULINO Staff 2 hours ago (12:42 PM)     Dr Ruiz pt calling, pt got a positive pregnancy test yesterday lmp 10-10-20 and was told to call because of previous miss ab in May. Pt # [phone]      Per Dr. Ruiz, scheduled for hcg and progesterone Monday 11/30

## 2020-11-30 ENCOUNTER — LAB VISIT (OUTPATIENT)
Dept: LAB | Facility: HOSPITAL | Age: 35
End: 2020-11-30
Attending: OBSTETRICS & GYNECOLOGY
Payer: COMMERCIAL

## 2020-11-30 DIAGNOSIS — N92.6 MISSED MENSES: ICD-10-CM

## 2020-11-30 LAB
HCG INTACT+B SERPL-ACNC: NORMAL MIU/ML
PROGEST SERPL-MCNC: 9.6 NG/ML

## 2020-11-30 PROCEDURE — 84702 CHORIONIC GONADOTROPIN TEST: CPT

## 2020-11-30 PROCEDURE — 84144 ASSAY OF PROGESTERONE: CPT

## 2020-12-01 DIAGNOSIS — Z3A.01 LESS THAN 8 WEEKS GESTATION OF PREGNANCY: Primary | ICD-10-CM

## 2020-12-01 RX ORDER — PROGESTERONE 200 MG/1
200 CAPSULE ORAL NIGHTLY
Qty: 30 CAPSULE | Refills: 11 | Status: ON HOLD | OUTPATIENT
Start: 2020-12-01 | End: 2021-07-06 | Stop reason: HOSPADM

## 2020-12-02 ENCOUNTER — LAB VISIT (OUTPATIENT)
Dept: LAB | Facility: HOSPITAL | Age: 35
End: 2020-12-02
Attending: OBSTETRICS & GYNECOLOGY
Payer: COMMERCIAL

## 2020-12-02 ENCOUNTER — PATIENT MESSAGE (OUTPATIENT)
Dept: OBSTETRICS AND GYNECOLOGY | Facility: CLINIC | Age: 35
End: 2020-12-02

## 2020-12-02 DIAGNOSIS — Z3A.01 LESS THAN 8 WEEKS GESTATION OF PREGNANCY: ICD-10-CM

## 2020-12-02 LAB — HCG INTACT+B SERPL-ACNC: NORMAL MIU/ML

## 2020-12-02 PROCEDURE — 84702 CHORIONIC GONADOTROPIN TEST: CPT

## 2020-12-03 ENCOUNTER — PROCEDURE VISIT (OUTPATIENT)
Dept: OBSTETRICS AND GYNECOLOGY | Facility: CLINIC | Age: 35
End: 2020-12-03
Attending: OBSTETRICS & GYNECOLOGY
Payer: COMMERCIAL

## 2020-12-03 ENCOUNTER — LAB VISIT (OUTPATIENT)
Dept: LAB | Facility: OTHER | Age: 35
End: 2020-12-03
Attending: OBSTETRICS & GYNECOLOGY
Payer: COMMERCIAL

## 2020-12-03 ENCOUNTER — INITIAL PRENATAL (OUTPATIENT)
Dept: OBSTETRICS AND GYNECOLOGY | Facility: CLINIC | Age: 35
End: 2020-12-03
Payer: COMMERCIAL

## 2020-12-03 VITALS
DIASTOLIC BLOOD PRESSURE: 78 MMHG | WEIGHT: 246.25 LBS | BODY MASS INDEX: 46.53 KG/M2 | SYSTOLIC BLOOD PRESSURE: 130 MMHG

## 2020-12-03 DIAGNOSIS — Z3A.01 LESS THAN 8 WEEKS GESTATION OF PREGNANCY: ICD-10-CM

## 2020-12-03 DIAGNOSIS — Z32.01 ENCOUNTER FOR PREGNANCY TEST, RESULT POSITIVE: ICD-10-CM

## 2020-12-03 DIAGNOSIS — O36.80X0 ENCOUNTER TO DETERMINE FETAL VIABILITY OF PREGNANCY, SINGLE OR UNSPECIFIED FETUS: ICD-10-CM

## 2020-12-03 DIAGNOSIS — Z32.01 ENCOUNTER FOR PREGNANCY TEST, RESULT POSITIVE: Primary | ICD-10-CM

## 2020-12-03 DIAGNOSIS — Z3A.01 7 WEEKS GESTATION OF PREGNANCY: ICD-10-CM

## 2020-12-03 DIAGNOSIS — Z36.89 ESTABLISH GESTATIONAL AGE, ULTRASOUND: ICD-10-CM

## 2020-12-03 LAB
ABO + RH BLD: NORMAL
BASOPHILS # BLD AUTO: 0.05 K/UL (ref 0–0.2)
BASOPHILS NFR BLD: 0.5 % (ref 0–1.9)
BLD GP AB SCN CELLS X3 SERPL QL: NORMAL
DIFFERENTIAL METHOD: NORMAL
EOSINOPHIL # BLD AUTO: 0.2 K/UL (ref 0–0.5)
EOSINOPHIL NFR BLD: 1.9 % (ref 0–8)
ERYTHROCYTE [DISTWIDTH] IN BLOOD BY AUTOMATED COUNT: 12.4 % (ref 11.5–14.5)
HCT VFR BLD AUTO: 38.8 % (ref 37–48.5)
HGB BLD-MCNC: 12.7 G/DL (ref 12–16)
IMM GRANULOCYTES # BLD AUTO: 0.03 K/UL (ref 0–0.04)
IMM GRANULOCYTES NFR BLD AUTO: 0.3 % (ref 0–0.5)
LYMPHOCYTES # BLD AUTO: 2.7 K/UL (ref 1–4.8)
LYMPHOCYTES NFR BLD: 26.6 % (ref 18–48)
MCH RBC QN AUTO: 29.3 PG (ref 27–31)
MCHC RBC AUTO-ENTMCNC: 32.7 G/DL (ref 32–36)
MCV RBC AUTO: 89 FL (ref 82–98)
MONOCYTES # BLD AUTO: 0.5 K/UL (ref 0.3–1)
MONOCYTES NFR BLD: 5.2 % (ref 4–15)
NEUTROPHILS # BLD AUTO: 6.8 K/UL (ref 1.8–7.7)
NEUTROPHILS NFR BLD: 65.5 % (ref 38–73)
NRBC BLD-RTO: 0 /100 WBC
PLATELET # BLD AUTO: 269 K/UL (ref 150–350)
PMV BLD AUTO: 10.1 FL (ref 9.2–12.9)
RBC # BLD AUTO: 4.34 M/UL (ref 4–5.4)
TSH SERPL DL<=0.005 MIU/L-ACNC: 1.1 UIU/ML (ref 0.4–4)
WBC # BLD AUTO: 10.32 K/UL (ref 3.9–12.7)

## 2020-12-03 PROCEDURE — 85025 COMPLETE CBC W/AUTO DIFF WBC: CPT

## 2020-12-03 PROCEDURE — 87340 HEPATITIS B SURFACE AG IA: CPT

## 2020-12-03 PROCEDURE — 0502F SUBSEQUENT PRENATAL CARE: CPT | Mod: CPTII,S$GLB,, | Performed by: OBSTETRICS & GYNECOLOGY

## 2020-12-03 PROCEDURE — 0502F PR SUBSEQUENT PRENATAL CARE: ICD-10-PCS | Mod: CPTII,S$GLB,, | Performed by: OBSTETRICS & GYNECOLOGY

## 2020-12-03 PROCEDURE — 87086 URINE CULTURE/COLONY COUNT: CPT

## 2020-12-03 PROCEDURE — 86592 SYPHILIS TEST NON-TREP QUAL: CPT

## 2020-12-03 PROCEDURE — 84443 ASSAY THYROID STIM HORMONE: CPT

## 2020-12-03 PROCEDURE — 84144 ASSAY OF PROGESTERONE: CPT

## 2020-12-03 PROCEDURE — 86762 RUBELLA ANTIBODY: CPT

## 2020-12-03 PROCEDURE — 36415 COLL VENOUS BLD VENIPUNCTURE: CPT

## 2020-12-03 PROCEDURE — 99999 PR PBB SHADOW E&M-EST. PATIENT-LVL III: ICD-10-PCS | Mod: PBBFAC,,, | Performed by: OBSTETRICS & GYNECOLOGY

## 2020-12-03 PROCEDURE — 76801 OB US < 14 WKS SINGLE FETUS: CPT | Mod: S$GLB,,, | Performed by: OBSTETRICS & GYNECOLOGY

## 2020-12-03 PROCEDURE — 86703 HIV-1/HIV-2 1 RESULT ANTBDY: CPT

## 2020-12-03 PROCEDURE — 86901 BLOOD TYPING SEROLOGIC RH(D): CPT

## 2020-12-03 PROCEDURE — 99999 PR PBB SHADOW E&M-EST. PATIENT-LVL III: CPT | Mod: PBBFAC,,, | Performed by: OBSTETRICS & GYNECOLOGY

## 2020-12-03 PROCEDURE — 76801 PR US, OB <14WKS, TRANSABD, SINGLE GESTATION: ICD-10-PCS | Mod: S$GLB,,, | Performed by: OBSTETRICS & GYNECOLOGY

## 2020-12-03 NOTE — PROGRESS NOTES
Obstetrical ultrasound completed today.  See report in imaging section of [facility].

## 2020-12-04 LAB
BACTERIA UR CULT: NORMAL
HBV SURFACE AG SERPL QL IA: NEGATIVE
HIV 1+2 AB+HIV1 P24 AG SERPL QL IA: NEGATIVE
PROGEST SERPL-MCNC: 13 NG/ML
RPR SER QL: NORMAL
RUBV IGG SER-ACNC: 9.9 IU/ML
RUBV IGG SER-IMP: ABNORMAL

## 2020-12-07 LAB
C TRACH RRNA SPEC QL NAA+PROBE: NEGATIVE
N GONORRHOEA RRNA SPEC QL NAA+PROBE: NEGATIVE

## 2020-12-09 NOTE — PROGRESS NOTES
Patient here for ultrasound today due to slow increase in Bhcg per primary OB recommendations. She denies pelvic pain or vaginal bleeding.   Dating ultrasound reviewed with patient: SLIUP at 7w5d. Small posterior fibroid seen. FHT 169bpm. Normal adnexa.   OB labs discussed and ordered.  Patient to follow up with primary OB in next 2-4 weeks.

## 2020-12-15 ENCOUNTER — PATIENT MESSAGE (OUTPATIENT)
Dept: OBSTETRICS AND GYNECOLOGY | Facility: CLINIC | Age: 35
End: 2020-12-15

## 2020-12-16 NOTE — TELEPHONE ENCOUNTER
I spoke with patient. The pain has resolved since last night.She thinks she was having gas pains.  Patient will call back at the end of the week to update us . Her next scheduled appointment is 01/14/2021.

## 2021-01-14 ENCOUNTER — ROUTINE PRENATAL (OUTPATIENT)
Dept: OBSTETRICS AND GYNECOLOGY | Facility: CLINIC | Age: 36
End: 2021-01-14
Attending: OBSTETRICS & GYNECOLOGY
Payer: COMMERCIAL

## 2021-01-14 ENCOUNTER — LAB VISIT (OUTPATIENT)
Dept: LAB | Facility: HOSPITAL | Age: 36
End: 2021-01-14
Attending: OBSTETRICS & GYNECOLOGY
Payer: COMMERCIAL

## 2021-01-14 VITALS — BODY MASS INDEX: 48.09 KG/M2 | DIASTOLIC BLOOD PRESSURE: 68 MMHG | SYSTOLIC BLOOD PRESSURE: 120 MMHG | WEIGHT: 254.5 LBS

## 2021-01-14 DIAGNOSIS — Z34.90 PREGNANCY, UNSPECIFIED GESTATIONAL AGE: ICD-10-CM

## 2021-01-14 DIAGNOSIS — Z34.90 PREGNANCY, UNSPECIFIED GESTATIONAL AGE: Primary | ICD-10-CM

## 2021-01-14 PROCEDURE — 0502F PR SUBSEQUENT PRENATAL CARE: ICD-10-PCS | Mod: CPTII,S$GLB,, | Performed by: OBSTETRICS & GYNECOLOGY

## 2021-01-14 PROCEDURE — 84144 ASSAY OF PROGESTERONE: CPT

## 2021-01-14 PROCEDURE — 99999 PR PBB SHADOW E&M-EST. PATIENT-LVL III: CPT | Mod: PBBFAC,,, | Performed by: OBSTETRICS & GYNECOLOGY

## 2021-01-14 PROCEDURE — 99999 PR PBB SHADOW E&M-EST. PATIENT-LVL III: ICD-10-PCS | Mod: PBBFAC,,, | Performed by: OBSTETRICS & GYNECOLOGY

## 2021-01-14 PROCEDURE — 0502F SUBSEQUENT PRENATAL CARE: CPT | Mod: CPTII,S$GLB,, | Performed by: OBSTETRICS & GYNECOLOGY

## 2021-01-15 LAB — PROGEST SERPL-MCNC: 15.4 NG/ML

## 2021-01-17 ENCOUNTER — PATIENT MESSAGE (OUTPATIENT)
Dept: OBSTETRICS AND GYNECOLOGY | Facility: CLINIC | Age: 36
End: 2021-01-17

## 2021-02-10 ENCOUNTER — OFFICE VISIT (OUTPATIENT)
Dept: OBSTETRICS AND GYNECOLOGY | Facility: CLINIC | Age: 36
End: 2021-02-10
Payer: COMMERCIAL

## 2021-02-10 PROCEDURE — 99999 PR PBB SHADOW E&M-EST. PATIENT-LVL I: CPT | Mod: PBBFAC,,,

## 2021-02-10 PROCEDURE — 99999 PR PBB SHADOW E&M-EST. PATIENT-LVL I: ICD-10-PCS | Mod: PBBFAC,,,

## 2021-02-17 ENCOUNTER — TELEPHONE (OUTPATIENT)
Dept: OBSTETRICS AND GYNECOLOGY | Facility: CLINIC | Age: 36
End: 2021-02-17

## 2021-02-19 ENCOUNTER — PATIENT MESSAGE (OUTPATIENT)
Dept: MATERNAL FETAL MEDICINE | Facility: CLINIC | Age: 36
End: 2021-02-19

## 2021-02-22 ENCOUNTER — PROCEDURE VISIT (OUTPATIENT)
Dept: MATERNAL FETAL MEDICINE | Facility: CLINIC | Age: 36
End: 2021-02-22
Attending: OBSTETRICS & GYNECOLOGY
Payer: COMMERCIAL

## 2021-02-22 DIAGNOSIS — Z34.90 PREGNANCY, UNSPECIFIED GESTATIONAL AGE: ICD-10-CM

## 2021-02-22 DIAGNOSIS — O09.522 MULTIGRAVIDA OF ADVANCED MATERNAL AGE IN SECOND TRIMESTER: ICD-10-CM

## 2021-02-22 PROCEDURE — 76811 PR US, OB FETAL EVAL & EXAM, TRANSABDOM,FIRST GESTATION: ICD-10-PCS | Mod: S$GLB,,, | Performed by: OBSTETRICS & GYNECOLOGY

## 2021-02-22 PROCEDURE — 76811 OB US DETAILED SNGL FETUS: CPT | Mod: S$GLB,,, | Performed by: OBSTETRICS & GYNECOLOGY

## 2021-02-24 DIAGNOSIS — E11.9 TYPE 2 DIABETES MELLITUS WITHOUT COMPLICATION: ICD-10-CM

## 2021-03-11 ENCOUNTER — TELEPHONE (OUTPATIENT)
Dept: OBSTETRICS AND GYNECOLOGY | Facility: CLINIC | Age: 36
End: 2021-03-11

## 2021-03-22 ENCOUNTER — PROCEDURE VISIT (OUTPATIENT)
Dept: MATERNAL FETAL MEDICINE | Facility: CLINIC | Age: 36
End: 2021-03-22
Payer: COMMERCIAL

## 2021-03-22 PROCEDURE — 76816 PR  US,PREGNANT UTERUS,F/U,TRANSABD APP: ICD-10-PCS | Mod: S$GLB,,, | Performed by: OBSTETRICS & GYNECOLOGY

## 2021-03-22 PROCEDURE — 76816 OB US FOLLOW-UP PER FETUS: CPT | Mod: S$GLB,,, | Performed by: OBSTETRICS & GYNECOLOGY

## 2021-03-29 ENCOUNTER — PATIENT MESSAGE (OUTPATIENT)
Dept: PEDIATRICS | Facility: CLINIC | Age: 36
End: 2021-03-29

## 2021-04-16 ENCOUNTER — TELEPHONE (OUTPATIENT)
Dept: OBSTETRICS AND GYNECOLOGY | Facility: CLINIC | Age: 36
End: 2021-04-16

## 2021-04-16 ENCOUNTER — PATIENT MESSAGE (OUTPATIENT)
Dept: OBSTETRICS AND GYNECOLOGY | Facility: CLINIC | Age: 36
End: 2021-04-16

## 2021-04-16 DIAGNOSIS — Z34.90 PREGNANCY, UNSPECIFIED GESTATIONAL AGE: ICD-10-CM

## 2021-04-16 DIAGNOSIS — O09.522 AMA (ADVANCED MATERNAL AGE) MULTIGRAVIDA 35+, SECOND TRIMESTER: Primary | ICD-10-CM

## 2021-04-19 ENCOUNTER — TELEPHONE (OUTPATIENT)
Dept: OBSTETRICS AND GYNECOLOGY | Facility: CLINIC | Age: 36
End: 2021-04-19

## 2021-04-21 ENCOUNTER — LAB VISIT (OUTPATIENT)
Dept: LAB | Facility: HOSPITAL | Age: 36
End: 2021-04-21
Attending: OBSTETRICS & GYNECOLOGY
Payer: COMMERCIAL

## 2021-04-21 ENCOUNTER — PROCEDURE VISIT (OUTPATIENT)
Dept: OBSTETRICS AND GYNECOLOGY | Facility: CLINIC | Age: 36
End: 2021-04-21
Attending: OBSTETRICS & GYNECOLOGY
Payer: COMMERCIAL

## 2021-04-21 VITALS
WEIGHT: 284.38 LBS | BODY MASS INDEX: 53.74 KG/M2 | SYSTOLIC BLOOD PRESSURE: 122 MMHG | DIASTOLIC BLOOD PRESSURE: 80 MMHG

## 2021-04-21 DIAGNOSIS — E66.01 MATERNAL MORBID OBESITY, ANTEPARTUM: ICD-10-CM

## 2021-04-21 DIAGNOSIS — Z36.89 ENCOUNTER FOR ULTRASOUND TO CHECK FETAL GROWTH: ICD-10-CM

## 2021-04-21 DIAGNOSIS — O09.522 AMA (ADVANCED MATERNAL AGE) MULTIGRAVIDA 35+, SECOND TRIMESTER: ICD-10-CM

## 2021-04-21 DIAGNOSIS — Z13.1 SCREENING FOR DIABETES MELLITUS: ICD-10-CM

## 2021-04-21 DIAGNOSIS — Z3A.27 27 WEEKS GESTATION OF PREGNANCY: Primary | ICD-10-CM

## 2021-04-21 DIAGNOSIS — Z34.90 PREGNANCY, UNSPECIFIED GESTATIONAL AGE: ICD-10-CM

## 2021-04-21 DIAGNOSIS — O99.210 MATERNAL MORBID OBESITY, ANTEPARTUM: ICD-10-CM

## 2021-04-21 DIAGNOSIS — Z3A.27 27 WEEKS GESTATION OF PREGNANCY: ICD-10-CM

## 2021-04-21 PROCEDURE — 85025 COMPLETE CBC W/AUTO DIFF WBC: CPT | Performed by: OBSTETRICS & GYNECOLOGY

## 2021-04-21 PROCEDURE — 82728 ASSAY OF FERRITIN: CPT | Performed by: OBSTETRICS & GYNECOLOGY

## 2021-04-21 PROCEDURE — 76816 OB US FOLLOW-UP PER FETUS: CPT | Mod: S$GLB,,, | Performed by: OBSTETRICS & GYNECOLOGY

## 2021-04-21 PROCEDURE — 0502F PR SUBSEQUENT PRENATAL CARE: ICD-10-PCS | Mod: CPTII,S$GLB,, | Performed by: OBSTETRICS & GYNECOLOGY

## 2021-04-21 PROCEDURE — 82950 GLUCOSE TEST: CPT | Performed by: OBSTETRICS & GYNECOLOGY

## 2021-04-21 PROCEDURE — 0502F SUBSEQUENT PRENATAL CARE: CPT | Mod: CPTII,S$GLB,, | Performed by: OBSTETRICS & GYNECOLOGY

## 2021-04-21 PROCEDURE — 99999 PR PBB SHADOW E&M-EST. PATIENT-LVL III: ICD-10-PCS | Mod: PBBFAC,,, | Performed by: OBSTETRICS & GYNECOLOGY

## 2021-04-21 PROCEDURE — 76816 PR  US,PREGNANT UTERUS,F/U,TRANSABD APP: ICD-10-PCS | Mod: S$GLB,,, | Performed by: OBSTETRICS & GYNECOLOGY

## 2021-04-21 PROCEDURE — 99999 PR PBB SHADOW E&M-EST. PATIENT-LVL III: CPT | Mod: PBBFAC,,, | Performed by: OBSTETRICS & GYNECOLOGY

## 2021-04-21 PROCEDURE — 82306 VITAMIN D 25 HYDROXY: CPT | Performed by: OBSTETRICS & GYNECOLOGY

## 2021-04-21 RX ORDER — VALACYCLOVIR HYDROCHLORIDE 500 MG/1
TABLET, FILM COATED ORAL
Qty: 60 TABLET | Refills: 0 | Status: SHIPPED | OUTPATIENT
Start: 2021-04-21 | End: 2021-04-27 | Stop reason: SDUPTHER

## 2021-04-22 ENCOUNTER — PATIENT MESSAGE (OUTPATIENT)
Dept: OBSTETRICS AND GYNECOLOGY | Facility: CLINIC | Age: 36
End: 2021-04-22

## 2021-04-22 DIAGNOSIS — O24.419 GESTATIONAL DIABETES MELLITUS (GDM) IN SECOND TRIMESTER, GESTATIONAL DIABETES METHOD OF CONTROL UNSPECIFIED: Primary | ICD-10-CM

## 2021-04-22 LAB
25(OH)D3+25(OH)D2 SERPL-MCNC: 32 NG/ML (ref 30–96)
BASOPHILS # BLD AUTO: 0.04 K/UL (ref 0–0.2)
BASOPHILS NFR BLD: 0.3 % (ref 0–1.9)
DIFFERENTIAL METHOD: ABNORMAL
EOSINOPHIL # BLD AUTO: 0.2 K/UL (ref 0–0.5)
EOSINOPHIL NFR BLD: 1.3 % (ref 0–8)
ERYTHROCYTE [DISTWIDTH] IN BLOOD BY AUTOMATED COUNT: 13.8 % (ref 11.5–14.5)
FERRITIN SERPL-MCNC: 30 NG/ML (ref 20–300)
GLUCOSE SERPL-MCNC: 230 MG/DL (ref 70–140)
HCT VFR BLD AUTO: 38.4 % (ref 37–48.5)
HGB BLD-MCNC: 12.3 G/DL (ref 12–16)
IMM GRANULOCYTES # BLD AUTO: 0.09 K/UL (ref 0–0.04)
IMM GRANULOCYTES NFR BLD AUTO: 0.8 % (ref 0–0.5)
LYMPHOCYTES # BLD AUTO: 2.4 K/UL (ref 1–4.8)
LYMPHOCYTES NFR BLD: 19.9 % (ref 18–48)
MCH RBC QN AUTO: 29.4 PG (ref 27–31)
MCHC RBC AUTO-ENTMCNC: 32 G/DL (ref 32–36)
MCV RBC AUTO: 92 FL (ref 82–98)
MONOCYTES # BLD AUTO: 0.5 K/UL (ref 0.3–1)
MONOCYTES NFR BLD: 4.1 % (ref 4–15)
NEUTROPHILS # BLD AUTO: 8.8 K/UL (ref 1.8–7.7)
NEUTROPHILS NFR BLD: 73.6 % (ref 38–73)
NRBC BLD-RTO: 0 /100 WBC
PLATELET # BLD AUTO: 289 K/UL (ref 150–450)
PMV BLD AUTO: 11 FL (ref 9.2–12.9)
RBC # BLD AUTO: 4.19 M/UL (ref 4–5.4)
WBC # BLD AUTO: 11.91 K/UL (ref 3.9–12.7)

## 2021-04-22 RX ORDER — DEXTROSE 4 G
1 TABLET,CHEWABLE ORAL 4 TIMES DAILY
Qty: 1 EACH | Refills: 0 | Status: SHIPPED | OUTPATIENT
Start: 2021-04-22 | End: 2022-04-25

## 2021-04-22 RX ORDER — LANCETS
1 EACH MISCELLANEOUS 4 TIMES DAILY
Qty: 200 EACH | Refills: 6 | Status: SHIPPED | OUTPATIENT
Start: 2021-04-22 | End: 2021-08-27

## 2021-04-22 RX ORDER — BLOOD SUGAR DIAGNOSTIC
1 STRIP MISCELLANEOUS 4 TIMES DAILY
Qty: 200 STRIP | Refills: 6 | Status: SHIPPED | OUTPATIENT
Start: 2021-04-22 | End: 2021-08-27

## 2021-04-22 RX ORDER — VALACYCLOVIR HYDROCHLORIDE 500 MG/1
500 TABLET, FILM COATED ORAL DAILY
Qty: 30 TABLET | Refills: 4 | Status: SHIPPED | OUTPATIENT
Start: 2021-04-22 | End: 2021-05-22

## 2021-04-26 ENCOUNTER — PATIENT MESSAGE (OUTPATIENT)
Dept: OBSTETRICS AND GYNECOLOGY | Facility: CLINIC | Age: 36
End: 2021-04-26

## 2021-04-27 ENCOUNTER — PATIENT MESSAGE (OUTPATIENT)
Dept: OBSTETRICS AND GYNECOLOGY | Facility: CLINIC | Age: 36
End: 2021-04-27

## 2021-04-27 ENCOUNTER — ROUTINE PRENATAL (OUTPATIENT)
Dept: OBSTETRICS AND GYNECOLOGY | Facility: CLINIC | Age: 36
End: 2021-04-27
Attending: OBSTETRICS & GYNECOLOGY
Payer: COMMERCIAL

## 2021-04-27 ENCOUNTER — CLINICAL SUPPORT (OUTPATIENT)
Dept: DIABETES | Facility: CLINIC | Age: 36
End: 2021-04-27
Payer: COMMERCIAL

## 2021-04-27 ENCOUNTER — LAB VISIT (OUTPATIENT)
Dept: LAB | Facility: HOSPITAL | Age: 36
End: 2021-04-27
Attending: OBSTETRICS & GYNECOLOGY
Payer: COMMERCIAL

## 2021-04-27 ENCOUNTER — PATIENT MESSAGE (OUTPATIENT)
Dept: DIABETES | Facility: CLINIC | Age: 36
End: 2021-04-27

## 2021-04-27 VITALS — BODY MASS INDEX: 52.65 KG/M2 | WEIGHT: 278.69 LBS

## 2021-04-27 VITALS — WEIGHT: 280 LBS | DIASTOLIC BLOOD PRESSURE: 70 MMHG | BODY MASS INDEX: 52.9 KG/M2 | SYSTOLIC BLOOD PRESSURE: 110 MMHG

## 2021-04-27 DIAGNOSIS — Z3A.28 28 WEEKS GESTATION OF PREGNANCY: Primary | ICD-10-CM

## 2021-04-27 DIAGNOSIS — O24.419 GESTATIONAL DIABETES MELLITUS (GDM) IN SECOND TRIMESTER, GESTATIONAL DIABETES METHOD OF CONTROL UNSPECIFIED: ICD-10-CM

## 2021-04-27 DIAGNOSIS — Z3A.28 28 WEEKS GESTATION OF PREGNANCY: ICD-10-CM

## 2021-04-27 PROCEDURE — 99999 PR PBB SHADOW E&M-EST. PATIENT-LVL I: CPT | Mod: PBBFAC,,, | Performed by: DIETITIAN, REGISTERED

## 2021-04-27 PROCEDURE — 84156 ASSAY OF PROTEIN URINE: CPT | Performed by: OBSTETRICS & GYNECOLOGY

## 2021-04-27 PROCEDURE — 80053 COMPREHEN METABOLIC PANEL: CPT | Performed by: OBSTETRICS & GYNECOLOGY

## 2021-04-27 PROCEDURE — 99999 PR PBB SHADOW E&M-EST. PATIENT-LVL III: CPT | Mod: PBBFAC,,, | Performed by: OBSTETRICS & GYNECOLOGY

## 2021-04-27 PROCEDURE — G0108 PR DIAB MANAGE TRN  PER INDIV: ICD-10-PCS | Mod: S$GLB,,, | Performed by: DIETITIAN, REGISTERED

## 2021-04-27 PROCEDURE — 99999 PR PBB SHADOW E&M-EST. PATIENT-LVL III: ICD-10-PCS | Mod: PBBFAC,,, | Performed by: OBSTETRICS & GYNECOLOGY

## 2021-04-27 PROCEDURE — 0502F SUBSEQUENT PRENATAL CARE: CPT | Mod: CPTII,S$GLB,, | Performed by: OBSTETRICS & GYNECOLOGY

## 2021-04-27 PROCEDURE — 0502F PR SUBSEQUENT PRENATAL CARE: ICD-10-PCS | Mod: CPTII,S$GLB,, | Performed by: OBSTETRICS & GYNECOLOGY

## 2021-04-27 PROCEDURE — G0108 DIAB MANAGE TRN  PER INDIV: HCPCS | Mod: S$GLB,,, | Performed by: DIETITIAN, REGISTERED

## 2021-04-27 PROCEDURE — 99999 PR PBB SHADOW E&M-EST. PATIENT-LVL I: ICD-10-PCS | Mod: PBBFAC,,, | Performed by: DIETITIAN, REGISTERED

## 2021-04-27 RX ORDER — HUMAN INSULIN 100 [IU]/ML
INJECTION, SUSPENSION SUBCUTANEOUS
Qty: 10 ML | Refills: 3 | Status: SHIPPED | OUTPATIENT
Start: 2021-04-27 | End: 2021-04-27 | Stop reason: SDUPTHER

## 2021-04-27 RX ORDER — SYRINGE,SAFETY WITH NEEDLE,1ML 25GX1"
4 SYRINGE (EA) MISCELLANEOUS 4 TIMES DAILY
Qty: 120 EACH | Refills: 3 | Status: SHIPPED | OUTPATIENT
Start: 2021-04-27 | End: 2021-08-27

## 2021-04-27 RX ORDER — HUMAN INSULIN 100 [IU]/ML
INJECTION, SUSPENSION SUBCUTANEOUS
Qty: 10 ML | Refills: 3 | Status: SHIPPED | OUTPATIENT
Start: 2021-04-27 | End: 2021-06-17

## 2021-04-28 LAB
ALBUMIN SERPL BCP-MCNC: 2.9 G/DL (ref 3.5–5.2)
ALP SERPL-CCNC: 70 U/L (ref 55–135)
ALT SERPL W/O P-5'-P-CCNC: 12 U/L (ref 10–44)
ANION GAP SERPL CALC-SCNC: 10 MMOL/L (ref 8–16)
AST SERPL-CCNC: 14 U/L (ref 10–40)
BILIRUB SERPL-MCNC: 0.2 MG/DL (ref 0.1–1)
BUN SERPL-MCNC: 11 MG/DL (ref 6–20)
CALCIUM SERPL-MCNC: 9.3 MG/DL (ref 8.7–10.5)
CHLORIDE SERPL-SCNC: 106 MMOL/L (ref 95–110)
CO2 SERPL-SCNC: 18 MMOL/L (ref 23–29)
CREAT SERPL-MCNC: 0.6 MG/DL (ref 0.5–1.4)
CREAT UR-MCNC: 92 MG/DL (ref 15–325)
EST. GFR  (AFRICAN AMERICAN): >60 ML/MIN/1.73 M^2
EST. GFR  (NON AFRICAN AMERICAN): >60 ML/MIN/1.73 M^2
GLUCOSE SERPL-MCNC: 123 MG/DL (ref 70–110)
POTASSIUM SERPL-SCNC: 4.2 MMOL/L (ref 3.5–5.1)
PROT SERPL-MCNC: 7 G/DL (ref 6–8.4)
PROT UR-MCNC: 10 MG/DL (ref 0–15)
PROT/CREAT UR: 0.11 MG/G{CREAT} (ref 0–0.2)
SODIUM SERPL-SCNC: 134 MMOL/L (ref 136–145)

## 2021-04-29 ENCOUNTER — PATIENT MESSAGE (OUTPATIENT)
Dept: OBSTETRICS AND GYNECOLOGY | Facility: CLINIC | Age: 36
End: 2021-04-29

## 2021-04-29 ENCOUNTER — TELEPHONE (OUTPATIENT)
Dept: OBSTETRICS AND GYNECOLOGY | Facility: CLINIC | Age: 36
End: 2021-04-29

## 2021-04-30 ENCOUNTER — PATIENT MESSAGE (OUTPATIENT)
Dept: OBSTETRICS AND GYNECOLOGY | Facility: CLINIC | Age: 36
End: 2021-04-30

## 2021-05-05 ENCOUNTER — PATIENT MESSAGE (OUTPATIENT)
Dept: DIABETES | Facility: CLINIC | Age: 36
End: 2021-05-05

## 2021-05-07 ENCOUNTER — OFFICE VISIT (OUTPATIENT)
Dept: MATERNAL FETAL MEDICINE | Facility: CLINIC | Age: 36
End: 2021-05-07
Payer: COMMERCIAL

## 2021-05-07 VITALS
HEIGHT: 61 IN | SYSTOLIC BLOOD PRESSURE: 122 MMHG | WEIGHT: 277.56 LBS | DIASTOLIC BLOOD PRESSURE: 74 MMHG | BODY MASS INDEX: 52.4 KG/M2

## 2021-05-07 DIAGNOSIS — Z3A.28 28 WEEKS GESTATION OF PREGNANCY: ICD-10-CM

## 2021-05-07 DIAGNOSIS — Z36.89 ENCOUNTER FOR ULTRASOUND TO CHECK FETAL GROWTH: Primary | ICD-10-CM

## 2021-05-07 DIAGNOSIS — O24.414 INSULIN CONTROLLED GESTATIONAL DIABETES MELLITUS (GDM) IN THIRD TRIMESTER: ICD-10-CM

## 2021-05-07 PROCEDURE — 99999 PR PBB SHADOW E&M-EST. PATIENT-LVL III: CPT | Mod: PBBFAC,,, | Performed by: OBSTETRICS & GYNECOLOGY

## 2021-05-07 PROCEDURE — 3008F PR BODY MASS INDEX (BMI) DOCUMENTED: ICD-10-PCS | Mod: CPTII,S$GLB,, | Performed by: OBSTETRICS & GYNECOLOGY

## 2021-05-07 PROCEDURE — 99214 PR OFFICE/OUTPT VISIT, EST, LEVL IV, 30-39 MIN: ICD-10-PCS | Mod: S$GLB,,, | Performed by: OBSTETRICS & GYNECOLOGY

## 2021-05-07 PROCEDURE — 3008F BODY MASS INDEX DOCD: CPT | Mod: CPTII,S$GLB,, | Performed by: OBSTETRICS & GYNECOLOGY

## 2021-05-07 PROCEDURE — 99214 OFFICE O/P EST MOD 30 MIN: CPT | Mod: S$GLB,,, | Performed by: OBSTETRICS & GYNECOLOGY

## 2021-05-07 PROCEDURE — 1126F AMNT PAIN NOTED NONE PRSNT: CPT | Mod: S$GLB,,, | Performed by: OBSTETRICS & GYNECOLOGY

## 2021-05-07 PROCEDURE — 1126F PR PAIN SEVERITY QUANTIFIED, NO PAIN PRESENT: ICD-10-PCS | Mod: S$GLB,,, | Performed by: OBSTETRICS & GYNECOLOGY

## 2021-05-07 PROCEDURE — 99999 PR PBB SHADOW E&M-EST. PATIENT-LVL III: ICD-10-PCS | Mod: PBBFAC,,, | Performed by: OBSTETRICS & GYNECOLOGY

## 2021-05-07 RX ORDER — PEN NEEDLE, DIABETIC 29 G X1/2"
NEEDLE, DISPOSABLE MISCELLANEOUS
COMMUNITY
Start: 2021-04-28 | End: 2021-11-25 | Stop reason: CLARIF

## 2021-05-12 ENCOUNTER — ROUTINE PRENATAL (OUTPATIENT)
Dept: OBSTETRICS AND GYNECOLOGY | Facility: CLINIC | Age: 36
End: 2021-05-12
Attending: OBSTETRICS & GYNECOLOGY
Payer: COMMERCIAL

## 2021-05-12 VITALS — SYSTOLIC BLOOD PRESSURE: 128 MMHG | BODY MASS INDEX: 52.9 KG/M2 | DIASTOLIC BLOOD PRESSURE: 80 MMHG | WEIGHT: 280 LBS

## 2021-05-12 DIAGNOSIS — Z3A.30 30 WEEKS GESTATION OF PREGNANCY: Primary | ICD-10-CM

## 2021-05-12 PROCEDURE — 0502F PR SUBSEQUENT PRENATAL CARE: ICD-10-PCS | Mod: CPTII,S$GLB,, | Performed by: OBSTETRICS & GYNECOLOGY

## 2021-05-12 PROCEDURE — 99999 PR PBB SHADOW E&M-EST. PATIENT-LVL III: CPT | Mod: PBBFAC,,, | Performed by: OBSTETRICS & GYNECOLOGY

## 2021-05-12 PROCEDURE — 0502F SUBSEQUENT PRENATAL CARE: CPT | Mod: CPTII,S$GLB,, | Performed by: OBSTETRICS & GYNECOLOGY

## 2021-05-12 PROCEDURE — 99999 PR PBB SHADOW E&M-EST. PATIENT-LVL III: ICD-10-PCS | Mod: PBBFAC,,, | Performed by: OBSTETRICS & GYNECOLOGY

## 2021-05-13 ENCOUNTER — TELEPHONE (OUTPATIENT)
Dept: OBSTETRICS AND GYNECOLOGY | Facility: CLINIC | Age: 36
End: 2021-05-13

## 2021-05-18 ENCOUNTER — PATIENT MESSAGE (OUTPATIENT)
Dept: OBSTETRICS AND GYNECOLOGY | Facility: CLINIC | Age: 36
End: 2021-05-18

## 2021-05-19 ENCOUNTER — ROUTINE PRENATAL (OUTPATIENT)
Dept: OBSTETRICS AND GYNECOLOGY | Facility: CLINIC | Age: 36
End: 2021-05-19
Attending: OBSTETRICS & GYNECOLOGY
Payer: COMMERCIAL

## 2021-05-19 VITALS
BODY MASS INDEX: 53.28 KG/M2 | SYSTOLIC BLOOD PRESSURE: 110 MMHG | WEIGHT: 281.94 LBS | DIASTOLIC BLOOD PRESSURE: 70 MMHG

## 2021-05-19 DIAGNOSIS — O24.414 INSULIN CONTROLLED GESTATIONAL DIABETES MELLITUS (GDM) IN THIRD TRIMESTER: Primary | ICD-10-CM

## 2021-05-19 PROCEDURE — 0502F SUBSEQUENT PRENATAL CARE: CPT | Mod: CPTII,S$GLB,, | Performed by: OBSTETRICS & GYNECOLOGY

## 2021-05-19 PROCEDURE — 99999 PR PBB SHADOW E&M-EST. PATIENT-LVL II: CPT | Mod: PBBFAC,,, | Performed by: OBSTETRICS & GYNECOLOGY

## 2021-05-19 PROCEDURE — 99999 PR PBB SHADOW E&M-EST. PATIENT-LVL II: ICD-10-PCS | Mod: PBBFAC,,, | Performed by: OBSTETRICS & GYNECOLOGY

## 2021-05-19 PROCEDURE — 0502F PR SUBSEQUENT PRENATAL CARE: ICD-10-PCS | Mod: CPTII,S$GLB,, | Performed by: OBSTETRICS & GYNECOLOGY

## 2021-05-24 ENCOUNTER — PATIENT MESSAGE (OUTPATIENT)
Dept: OBSTETRICS AND GYNECOLOGY | Facility: CLINIC | Age: 36
End: 2021-05-24

## 2021-05-25 ENCOUNTER — PROCEDURE VISIT (OUTPATIENT)
Dept: OBSTETRICS AND GYNECOLOGY | Facility: CLINIC | Age: 36
End: 2021-05-25
Attending: OBSTETRICS & GYNECOLOGY
Payer: COMMERCIAL

## 2021-05-25 VITALS
BODY MASS INDEX: 53.51 KG/M2 | WEIGHT: 283.19 LBS | SYSTOLIC BLOOD PRESSURE: 114 MMHG | DIASTOLIC BLOOD PRESSURE: 80 MMHG

## 2021-05-25 DIAGNOSIS — O24.414 INSULIN CONTROLLED GESTATIONAL DIABETES MELLITUS (GDM) IN THIRD TRIMESTER: ICD-10-CM

## 2021-05-25 DIAGNOSIS — Z3A.32 32 WEEKS GESTATION OF PREGNANCY: Primary | ICD-10-CM

## 2021-05-25 PROCEDURE — 76819 PR US, OB, FETAL BIOPHYSICAL, W/O NST: ICD-10-PCS | Mod: S$GLB,,, | Performed by: OBSTETRICS & GYNECOLOGY

## 2021-05-25 PROCEDURE — 0502F SUBSEQUENT PRENATAL CARE: CPT | Mod: CPTII,S$GLB,, | Performed by: OBSTETRICS & GYNECOLOGY

## 2021-05-25 PROCEDURE — 99999 PR PBB SHADOW E&M-EST. PATIENT-LVL III: CPT | Mod: PBBFAC,,, | Performed by: OBSTETRICS & GYNECOLOGY

## 2021-05-25 PROCEDURE — 0502F PR SUBSEQUENT PRENATAL CARE: ICD-10-PCS | Mod: CPTII,S$GLB,, | Performed by: OBSTETRICS & GYNECOLOGY

## 2021-05-25 PROCEDURE — 99999 PR PBB SHADOW E&M-EST. PATIENT-LVL III: ICD-10-PCS | Mod: PBBFAC,,, | Performed by: OBSTETRICS & GYNECOLOGY

## 2021-05-25 PROCEDURE — 76819 FETAL BIOPHYS PROFIL W/O NST: CPT | Mod: S$GLB,,, | Performed by: OBSTETRICS & GYNECOLOGY

## 2021-05-28 ENCOUNTER — PATIENT MESSAGE (OUTPATIENT)
Dept: OBSTETRICS AND GYNECOLOGY | Facility: CLINIC | Age: 36
End: 2021-05-28

## 2021-06-04 ENCOUNTER — ROUTINE PRENATAL (OUTPATIENT)
Dept: OBSTETRICS AND GYNECOLOGY | Facility: CLINIC | Age: 36
End: 2021-06-04
Attending: OBSTETRICS & GYNECOLOGY
Payer: COMMERCIAL

## 2021-06-04 VITALS
DIASTOLIC BLOOD PRESSURE: 70 MMHG | BODY MASS INDEX: 53.74 KG/M2 | SYSTOLIC BLOOD PRESSURE: 110 MMHG | WEIGHT: 284.38 LBS

## 2021-06-04 DIAGNOSIS — E66.01 MATERNAL MORBID OBESITY, ANTEPARTUM: ICD-10-CM

## 2021-06-04 DIAGNOSIS — O99.210 MATERNAL MORBID OBESITY, ANTEPARTUM: ICD-10-CM

## 2021-06-04 DIAGNOSIS — O09.523 MULTIGRAVIDA OF ADVANCED MATERNAL AGE IN THIRD TRIMESTER: ICD-10-CM

## 2021-06-04 DIAGNOSIS — Z3A.33 33 WEEKS GESTATION OF PREGNANCY: Primary | ICD-10-CM

## 2021-06-04 DIAGNOSIS — Z3A.33 33 WEEKS GESTATION OF PREGNANCY: ICD-10-CM

## 2021-06-04 DIAGNOSIS — O24.414 INSULIN CONTROLLED GESTATIONAL DIABETES MELLITUS (GDM) IN THIRD TRIMESTER: ICD-10-CM

## 2021-06-04 PROCEDURE — 0502F SUBSEQUENT PRENATAL CARE: CPT | Mod: CPTII,S$GLB,, | Performed by: OBSTETRICS & GYNECOLOGY

## 2021-06-04 PROCEDURE — 99999 PR PBB SHADOW E&M-EST. PATIENT-LVL II: ICD-10-PCS | Mod: PBBFAC,,, | Performed by: OBSTETRICS & GYNECOLOGY

## 2021-06-04 PROCEDURE — 76819 PR US, OB, FETAL BIOPHYSICAL, W/O NST: ICD-10-PCS | Mod: S$GLB,,, | Performed by: OBSTETRICS & GYNECOLOGY

## 2021-06-04 PROCEDURE — 0502F PR SUBSEQUENT PRENATAL CARE: ICD-10-PCS | Mod: CPTII,S$GLB,, | Performed by: OBSTETRICS & GYNECOLOGY

## 2021-06-04 PROCEDURE — 76819 FETAL BIOPHYS PROFIL W/O NST: CPT | Mod: S$GLB,,, | Performed by: OBSTETRICS & GYNECOLOGY

## 2021-06-04 PROCEDURE — 99999 PR PBB SHADOW E&M-EST. PATIENT-LVL II: CPT | Mod: PBBFAC,,, | Performed by: OBSTETRICS & GYNECOLOGY

## 2021-06-04 RX ORDER — VALACYCLOVIR HYDROCHLORIDE 500 MG/1
500 TABLET, FILM COATED ORAL 2 TIMES DAILY
Qty: 30 TABLET | Refills: 2 | Status: SHIPPED | OUTPATIENT
Start: 2021-06-04 | End: 2021-12-31

## 2021-06-07 ENCOUNTER — PATIENT MESSAGE (OUTPATIENT)
Dept: OBSTETRICS AND GYNECOLOGY | Facility: CLINIC | Age: 36
End: 2021-06-07

## 2021-06-07 ENCOUNTER — OFFICE VISIT (OUTPATIENT)
Dept: MATERNAL FETAL MEDICINE | Facility: CLINIC | Age: 36
End: 2021-06-07
Payer: COMMERCIAL

## 2021-06-07 ENCOUNTER — PROCEDURE VISIT (OUTPATIENT)
Dept: MATERNAL FETAL MEDICINE | Facility: CLINIC | Age: 36
End: 2021-06-07
Payer: COMMERCIAL

## 2021-06-07 VITALS
HEIGHT: 61 IN | DIASTOLIC BLOOD PRESSURE: 82 MMHG | BODY MASS INDEX: 53.82 KG/M2 | SYSTOLIC BLOOD PRESSURE: 136 MMHG | WEIGHT: 285.06 LBS

## 2021-06-07 DIAGNOSIS — O24.414 INSULIN CONTROLLED GESTATIONAL DIABETES MELLITUS (GDM) IN THIRD TRIMESTER: ICD-10-CM

## 2021-06-07 DIAGNOSIS — Z36.89 ENCOUNTER FOR ULTRASOUND TO CHECK FETAL GROWTH: ICD-10-CM

## 2021-06-07 PROCEDURE — 76819 PR US, OB, FETAL BIOPHYSICAL, W/O NST: ICD-10-PCS | Mod: 26,S$GLB,, | Performed by: OBSTETRICS & GYNECOLOGY

## 2021-06-07 PROCEDURE — 1126F PR PAIN SEVERITY QUANTIFIED, NO PAIN PRESENT: ICD-10-PCS | Mod: S$GLB,,, | Performed by: OBSTETRICS & GYNECOLOGY

## 2021-06-07 PROCEDURE — 76816 PR  US,PREGNANT UTERUS,F/U,TRANSABD APP: ICD-10-PCS | Mod: 26,S$GLB,, | Performed by: OBSTETRICS & GYNECOLOGY

## 2021-06-07 PROCEDURE — 3008F PR BODY MASS INDEX (BMI) DOCUMENTED: ICD-10-PCS | Mod: CPTII,S$GLB,, | Performed by: OBSTETRICS & GYNECOLOGY

## 2021-06-07 PROCEDURE — 99999 PR PBB SHADOW E&M-EST. PATIENT-LVL III: ICD-10-PCS | Mod: PBBFAC,,, | Performed by: OBSTETRICS & GYNECOLOGY

## 2021-06-07 PROCEDURE — 1126F AMNT PAIN NOTED NONE PRSNT: CPT | Mod: S$GLB,,, | Performed by: OBSTETRICS & GYNECOLOGY

## 2021-06-07 PROCEDURE — 99213 OFFICE O/P EST LOW 20 MIN: CPT | Mod: 25,S$GLB,, | Performed by: OBSTETRICS & GYNECOLOGY

## 2021-06-07 PROCEDURE — 76819 FETAL BIOPHYS PROFIL W/O NST: CPT | Mod: 26,S$GLB,, | Performed by: OBSTETRICS & GYNECOLOGY

## 2021-06-07 PROCEDURE — 3008F BODY MASS INDEX DOCD: CPT | Mod: CPTII,S$GLB,, | Performed by: OBSTETRICS & GYNECOLOGY

## 2021-06-07 PROCEDURE — 76816 OB US FOLLOW-UP PER FETUS: CPT | Mod: 26,S$GLB,, | Performed by: OBSTETRICS & GYNECOLOGY

## 2021-06-07 PROCEDURE — 99999 PR PBB SHADOW E&M-EST. PATIENT-LVL III: CPT | Mod: PBBFAC,,, | Performed by: OBSTETRICS & GYNECOLOGY

## 2021-06-07 PROCEDURE — 99213 PR OFFICE/OUTPT VISIT, EST, LEVL III, 20-29 MIN: ICD-10-PCS | Mod: 25,S$GLB,, | Performed by: OBSTETRICS & GYNECOLOGY

## 2021-06-10 ENCOUNTER — CLINICAL SUPPORT (OUTPATIENT)
Dept: OBSTETRICS AND GYNECOLOGY | Facility: CLINIC | Age: 36
End: 2021-06-10
Payer: COMMERCIAL

## 2021-06-10 ENCOUNTER — ROUTINE PRENATAL (OUTPATIENT)
Dept: OBSTETRICS AND GYNECOLOGY | Facility: CLINIC | Age: 36
End: 2021-06-10
Attending: OBSTETRICS & GYNECOLOGY
Payer: COMMERCIAL

## 2021-06-10 VITALS
DIASTOLIC BLOOD PRESSURE: 78 MMHG | SYSTOLIC BLOOD PRESSURE: 110 MMHG | WEIGHT: 283.38 LBS | BODY MASS INDEX: 53.55 KG/M2

## 2021-06-10 DIAGNOSIS — Z3A.34 34 WEEKS GESTATION OF PREGNANCY: Primary | ICD-10-CM

## 2021-06-10 DIAGNOSIS — Z23 NEED FOR VACCINATION: Primary | ICD-10-CM

## 2021-06-10 PROCEDURE — 0502F SUBSEQUENT PRENATAL CARE: CPT | Mod: CPTII,S$GLB,, | Performed by: OBSTETRICS & GYNECOLOGY

## 2021-06-10 PROCEDURE — 90471 IMMUNIZATION ADMIN: CPT | Mod: S$GLB,,, | Performed by: OBSTETRICS & GYNECOLOGY

## 2021-06-10 PROCEDURE — 90715 TDAP VACCINE 7 YRS/> IM: CPT | Mod: S$GLB,,, | Performed by: OBSTETRICS & GYNECOLOGY

## 2021-06-10 PROCEDURE — 99999 PR PBB SHADOW E&M-EST. PATIENT-LVL III: ICD-10-PCS | Mod: PBBFAC,,, | Performed by: OBSTETRICS & GYNECOLOGY

## 2021-06-10 PROCEDURE — 90715 TDAP VACCINE GREATER THAN OR EQUAL TO 7YO IM: ICD-10-PCS | Mod: S$GLB,,, | Performed by: OBSTETRICS & GYNECOLOGY

## 2021-06-10 PROCEDURE — 99999 PR PBB SHADOW E&M-EST. PATIENT-LVL III: CPT | Mod: PBBFAC,,, | Performed by: OBSTETRICS & GYNECOLOGY

## 2021-06-10 PROCEDURE — 90471 TDAP VACCINE GREATER THAN OR EQUAL TO 7YO IM: ICD-10-PCS | Mod: S$GLB,,, | Performed by: OBSTETRICS & GYNECOLOGY

## 2021-06-10 PROCEDURE — 0502F PR SUBSEQUENT PRENATAL CARE: ICD-10-PCS | Mod: CPTII,S$GLB,, | Performed by: OBSTETRICS & GYNECOLOGY

## 2021-06-14 ENCOUNTER — TELEPHONE (OUTPATIENT)
Dept: OBSTETRICS AND GYNECOLOGY | Facility: CLINIC | Age: 36
End: 2021-06-14

## 2021-06-15 ENCOUNTER — PROCEDURE VISIT (OUTPATIENT)
Dept: OBSTETRICS AND GYNECOLOGY | Facility: CLINIC | Age: 36
End: 2021-06-15
Attending: OBSTETRICS & GYNECOLOGY
Payer: COMMERCIAL

## 2021-06-15 VITALS — BODY MASS INDEX: 54.4 KG/M2 | WEIGHT: 287.94 LBS | DIASTOLIC BLOOD PRESSURE: 70 MMHG | SYSTOLIC BLOOD PRESSURE: 120 MMHG

## 2021-06-15 DIAGNOSIS — O24.414 INSULIN CONTROLLED GESTATIONAL DIABETES MELLITUS (GDM) IN THIRD TRIMESTER: ICD-10-CM

## 2021-06-15 DIAGNOSIS — Z36.85 ANTENATAL SCREENING FOR STREPTOCOCCUS B: Primary | ICD-10-CM

## 2021-06-15 DIAGNOSIS — Z3A.35 35 WEEKS GESTATION OF PREGNANCY: ICD-10-CM

## 2021-06-15 PROCEDURE — 76819 FETAL BIOPHYS PROFIL W/O NST: CPT | Mod: S$GLB,,, | Performed by: OBSTETRICS & GYNECOLOGY

## 2021-06-15 PROCEDURE — 0502F SUBSEQUENT PRENATAL CARE: CPT | Mod: CPTII,S$GLB,, | Performed by: OBSTETRICS & GYNECOLOGY

## 2021-06-15 PROCEDURE — 0502F PR SUBSEQUENT PRENATAL CARE: ICD-10-PCS | Mod: CPTII,S$GLB,, | Performed by: OBSTETRICS & GYNECOLOGY

## 2021-06-15 PROCEDURE — 99999 PR PBB SHADOW E&M-EST. PATIENT-LVL III: ICD-10-PCS | Mod: PBBFAC,,, | Performed by: OBSTETRICS & GYNECOLOGY

## 2021-06-15 PROCEDURE — 99999 PR PBB SHADOW E&M-EST. PATIENT-LVL III: CPT | Mod: PBBFAC,,, | Performed by: OBSTETRICS & GYNECOLOGY

## 2021-06-15 PROCEDURE — 87081 CULTURE SCREEN ONLY: CPT | Performed by: OBSTETRICS & GYNECOLOGY

## 2021-06-15 PROCEDURE — 76819 PR US, OB, FETAL BIOPHYSICAL, W/O NST: ICD-10-PCS | Mod: S$GLB,,, | Performed by: OBSTETRICS & GYNECOLOGY

## 2021-06-15 PROCEDURE — 87147 CULTURE TYPE IMMUNOLOGIC: CPT | Performed by: OBSTETRICS & GYNECOLOGY

## 2021-06-18 LAB — BACTERIA SPEC AEROBE CULT: ABNORMAL

## 2021-06-23 ENCOUNTER — LAB VISIT (OUTPATIENT)
Dept: LAB | Facility: HOSPITAL | Age: 36
End: 2021-06-23
Attending: OBSTETRICS & GYNECOLOGY
Payer: COMMERCIAL

## 2021-06-23 ENCOUNTER — PATIENT MESSAGE (OUTPATIENT)
Dept: OBSTETRICS AND GYNECOLOGY | Facility: CLINIC | Age: 36
End: 2021-06-23

## 2021-06-23 ENCOUNTER — ROUTINE PRENATAL (OUTPATIENT)
Dept: OBSTETRICS AND GYNECOLOGY | Facility: CLINIC | Age: 36
End: 2021-06-23
Attending: OBSTETRICS & GYNECOLOGY
Payer: COMMERCIAL

## 2021-06-23 VITALS
WEIGHT: 285.81 LBS | BODY MASS INDEX: 54.01 KG/M2 | SYSTOLIC BLOOD PRESSURE: 128 MMHG | DIASTOLIC BLOOD PRESSURE: 70 MMHG

## 2021-06-23 DIAGNOSIS — Z3A.36 36 WEEKS GESTATION OF PREGNANCY: Primary | ICD-10-CM

## 2021-06-23 DIAGNOSIS — O24.414 INSULIN CONTROLLED GESTATIONAL DIABETES MELLITUS (GDM) IN THIRD TRIMESTER: ICD-10-CM

## 2021-06-23 DIAGNOSIS — Z3A.36 36 WEEKS GESTATION OF PREGNANCY: ICD-10-CM

## 2021-06-23 LAB
ERYTHROCYTE [DISTWIDTH] IN BLOOD BY AUTOMATED COUNT: 14.4 % (ref 11.5–14.5)
HCT VFR BLD AUTO: 40.4 % (ref 37–48.5)
HGB BLD-MCNC: 13 G/DL (ref 12–16)
MCH RBC QN AUTO: 28.9 PG (ref 27–31)
MCHC RBC AUTO-ENTMCNC: 32.2 G/DL (ref 32–36)
MCV RBC AUTO: 90 FL (ref 82–98)
PLATELET # BLD AUTO: 271 K/UL (ref 150–450)
PMV BLD AUTO: 10.7 FL (ref 9.2–12.9)
RBC # BLD AUTO: 4.5 M/UL (ref 4–5.4)
WBC # BLD AUTO: 12.42 K/UL (ref 3.9–12.7)

## 2021-06-23 PROCEDURE — 99999 PR PBB SHADOW E&M-EST. PATIENT-LVL III: CPT | Mod: PBBFAC,,, | Performed by: OBSTETRICS & GYNECOLOGY

## 2021-06-23 PROCEDURE — 0502F SUBSEQUENT PRENATAL CARE: CPT | Mod: CPTII,S$GLB,, | Performed by: OBSTETRICS & GYNECOLOGY

## 2021-06-23 PROCEDURE — 99999 PR PBB SHADOW E&M-EST. PATIENT-LVL III: ICD-10-PCS | Mod: PBBFAC,,, | Performed by: OBSTETRICS & GYNECOLOGY

## 2021-06-23 PROCEDURE — 76819 PR US, OB, FETAL BIOPHYSICAL, W/O NST: ICD-10-PCS | Mod: S$GLB,,, | Performed by: OBSTETRICS & GYNECOLOGY

## 2021-06-23 PROCEDURE — 85027 COMPLETE CBC AUTOMATED: CPT | Performed by: OBSTETRICS & GYNECOLOGY

## 2021-06-23 PROCEDURE — 76819 FETAL BIOPHYS PROFIL W/O NST: CPT | Mod: S$GLB,,, | Performed by: OBSTETRICS & GYNECOLOGY

## 2021-06-23 PROCEDURE — 86703 HIV-1/HIV-2 1 RESULT ANTBDY: CPT | Performed by: OBSTETRICS & GYNECOLOGY

## 2021-06-23 PROCEDURE — 0502F PR SUBSEQUENT PRENATAL CARE: ICD-10-PCS | Mod: CPTII,S$GLB,, | Performed by: OBSTETRICS & GYNECOLOGY

## 2021-06-23 PROCEDURE — 86592 SYPHILIS TEST NON-TREP QUAL: CPT | Performed by: OBSTETRICS & GYNECOLOGY

## 2021-06-24 LAB
HIV 1+2 AB+HIV1 P24 AG SERPL QL IA: NEGATIVE
RPR SER QL: NORMAL

## 2021-06-30 ENCOUNTER — TELEPHONE (OUTPATIENT)
Dept: OBSTETRICS AND GYNECOLOGY | Facility: CLINIC | Age: 36
End: 2021-06-30

## 2021-06-30 ENCOUNTER — OFFICE VISIT (OUTPATIENT)
Dept: MATERNAL FETAL MEDICINE | Facility: CLINIC | Age: 36
End: 2021-06-30
Payer: COMMERCIAL

## 2021-06-30 ENCOUNTER — PROCEDURE VISIT (OUTPATIENT)
Dept: MATERNAL FETAL MEDICINE | Facility: CLINIC | Age: 36
End: 2021-06-30
Payer: COMMERCIAL

## 2021-06-30 ENCOUNTER — ROUTINE PRENATAL (OUTPATIENT)
Dept: OBSTETRICS AND GYNECOLOGY | Facility: CLINIC | Age: 36
End: 2021-06-30
Attending: OBSTETRICS & GYNECOLOGY
Payer: COMMERCIAL

## 2021-06-30 VITALS
DIASTOLIC BLOOD PRESSURE: 80 MMHG | SYSTOLIC BLOOD PRESSURE: 112 MMHG | BODY MASS INDEX: 54.57 KG/M2 | WEIGHT: 288.81 LBS

## 2021-06-30 VITALS
HEIGHT: 61 IN | SYSTOLIC BLOOD PRESSURE: 142 MMHG | WEIGHT: 291 LBS | BODY MASS INDEX: 54.94 KG/M2 | DIASTOLIC BLOOD PRESSURE: 98 MMHG

## 2021-06-30 DIAGNOSIS — O16.3 ELEVATED BLOOD PRESSURE AFFECTING PREGNANCY IN THIRD TRIMESTER, ANTEPARTUM: ICD-10-CM

## 2021-06-30 DIAGNOSIS — Z3A.37 37 WEEKS GESTATION OF PREGNANCY: Primary | ICD-10-CM

## 2021-06-30 DIAGNOSIS — O24.414 INSULIN CONTROLLED GESTATIONAL DIABETES MELLITUS (GDM) IN THIRD TRIMESTER: Primary | ICD-10-CM

## 2021-06-30 DIAGNOSIS — O24.414 INSULIN CONTROLLED GESTATIONAL DIABETES MELLITUS (GDM) IN THIRD TRIMESTER: ICD-10-CM

## 2021-06-30 DIAGNOSIS — O24.319 PREGESTATIONAL DIABETES MELLITUS, MODIFIED WHITE CLASS B: Primary | ICD-10-CM

## 2021-06-30 PROCEDURE — 1126F PR PAIN SEVERITY QUANTIFIED, NO PAIN PRESENT: ICD-10-PCS | Mod: S$GLB,,, | Performed by: OBSTETRICS & GYNECOLOGY

## 2021-06-30 PROCEDURE — 99999 PR PBB SHADOW E&M-EST. PATIENT-LVL III: ICD-10-PCS | Mod: PBBFAC,,, | Performed by: OBSTETRICS & GYNECOLOGY

## 2021-06-30 PROCEDURE — 76816 PR  US,PREGNANT UTERUS,F/U,TRANSABD APP: ICD-10-PCS | Mod: S$GLB,,, | Performed by: OBSTETRICS & GYNECOLOGY

## 2021-06-30 PROCEDURE — 3008F PR BODY MASS INDEX (BMI) DOCUMENTED: ICD-10-PCS | Mod: CPTII,S$GLB,, | Performed by: OBSTETRICS & GYNECOLOGY

## 2021-06-30 PROCEDURE — 0502F SUBSEQUENT PRENATAL CARE: CPT | Mod: CPTII,S$GLB,, | Performed by: OBSTETRICS & GYNECOLOGY

## 2021-06-30 PROCEDURE — 99214 PR OFFICE/OUTPT VISIT, EST, LEVL IV, 30-39 MIN: ICD-10-PCS | Mod: 25,S$GLB,, | Performed by: OBSTETRICS & GYNECOLOGY

## 2021-06-30 PROCEDURE — 99214 OFFICE O/P EST MOD 30 MIN: CPT | Mod: 25,S$GLB,, | Performed by: OBSTETRICS & GYNECOLOGY

## 2021-06-30 PROCEDURE — 1126F AMNT PAIN NOTED NONE PRSNT: CPT | Mod: S$GLB,,, | Performed by: OBSTETRICS & GYNECOLOGY

## 2021-06-30 PROCEDURE — 99999 PR PBB SHADOW E&M-EST. PATIENT-LVL III: CPT | Mod: PBBFAC,,, | Performed by: OBSTETRICS & GYNECOLOGY

## 2021-06-30 PROCEDURE — 76819 PR US, OB, FETAL BIOPHYSICAL, W/O NST: ICD-10-PCS | Mod: S$GLB,,, | Performed by: OBSTETRICS & GYNECOLOGY

## 2021-06-30 PROCEDURE — 0502F PR SUBSEQUENT PRENATAL CARE: ICD-10-PCS | Mod: CPTII,S$GLB,, | Performed by: OBSTETRICS & GYNECOLOGY

## 2021-06-30 PROCEDURE — 3008F BODY MASS INDEX DOCD: CPT | Mod: CPTII,S$GLB,, | Performed by: OBSTETRICS & GYNECOLOGY

## 2021-06-30 PROCEDURE — 76819 FETAL BIOPHYS PROFIL W/O NST: CPT | Mod: S$GLB,,, | Performed by: OBSTETRICS & GYNECOLOGY

## 2021-06-30 PROCEDURE — 76816 OB US FOLLOW-UP PER FETUS: CPT | Mod: S$GLB,,, | Performed by: OBSTETRICS & GYNECOLOGY

## 2021-07-02 ENCOUNTER — RESEARCH ENCOUNTER (OUTPATIENT)
Dept: RESEARCH | Facility: HOSPITAL | Age: 36
End: 2021-07-02

## 2021-07-02 ENCOUNTER — HOSPITAL ENCOUNTER (INPATIENT)
Facility: OTHER | Age: 36
LOS: 4 days | Discharge: HOME | End: 2021-07-06
Attending: OBSTETRICS & GYNECOLOGY | Admitting: OBSTETRICS & GYNECOLOGY
Payer: COMMERCIAL

## 2021-07-02 DIAGNOSIS — Z34.90 ENCOUNTER FOR INDUCTION OF LABOR: ICD-10-CM

## 2021-07-02 DIAGNOSIS — E11.9 TYPE 2 DIABETES MELLITUS WITHOUT COMPLICATION, WITHOUT LONG-TERM CURRENT USE OF INSULIN: ICD-10-CM

## 2021-07-02 LAB
ABO + RH BLD: NORMAL
ALBUMIN SERPL BCP-MCNC: 2.5 G/DL (ref 3.5–5.2)
ALP SERPL-CCNC: 128 U/L (ref 55–135)
ALT SERPL W/O P-5'-P-CCNC: 14 U/L (ref 10–44)
ANION GAP SERPL CALC-SCNC: 9 MMOL/L (ref 8–16)
AST SERPL-CCNC: 13 U/L (ref 10–40)
BASOPHILS # BLD AUTO: 0.05 K/UL (ref 0–0.2)
BASOPHILS NFR BLD: 0.4 % (ref 0–1.9)
BILIRUB SERPL-MCNC: 0.3 MG/DL (ref 0.1–1)
BLD GP AB SCN CELLS X3 SERPL QL: NORMAL
BUN SERPL-MCNC: 11 MG/DL (ref 6–20)
CALCIUM SERPL-MCNC: 9.1 MG/DL (ref 8.7–10.5)
CHLORIDE SERPL-SCNC: 108 MMOL/L (ref 95–110)
CO2 SERPL-SCNC: 19 MMOL/L (ref 23–29)
CREAT SERPL-MCNC: 0.6 MG/DL (ref 0.5–1.4)
DIFFERENTIAL METHOD: ABNORMAL
EOSINOPHIL # BLD AUTO: 0.2 K/UL (ref 0–0.5)
EOSINOPHIL NFR BLD: 1.7 % (ref 0–8)
ERYTHROCYTE [DISTWIDTH] IN BLOOD BY AUTOMATED COUNT: 14.7 % (ref 11.5–14.5)
EST. GFR  (AFRICAN AMERICAN): >60 ML/MIN/1.73 M^2
EST. GFR  (NON AFRICAN AMERICAN): >60 ML/MIN/1.73 M^2
GLUCOSE SERPL-MCNC: 105 MG/DL (ref 70–110)
HCT VFR BLD AUTO: 40.2 % (ref 37–48.5)
HGB BLD-MCNC: 13.6 G/DL (ref 12–16)
IMM GRANULOCYTES # BLD AUTO: 0.05 K/UL (ref 0–0.04)
IMM GRANULOCYTES NFR BLD AUTO: 0.4 % (ref 0–0.5)
LYMPHOCYTES # BLD AUTO: 3 K/UL (ref 1–4.8)
LYMPHOCYTES NFR BLD: 23.8 % (ref 18–48)
MCH RBC QN AUTO: 29.4 PG (ref 27–31)
MCHC RBC AUTO-ENTMCNC: 33.8 G/DL (ref 32–36)
MCV RBC AUTO: 87 FL (ref 82–98)
MONOCYTES # BLD AUTO: 0.8 K/UL (ref 0.3–1)
MONOCYTES NFR BLD: 6 % (ref 4–15)
NEUTROPHILS # BLD AUTO: 8.6 K/UL (ref 1.8–7.7)
NEUTROPHILS NFR BLD: 67.7 % (ref 38–73)
NRBC BLD-RTO: 0 /100 WBC
PLATELET # BLD AUTO: 300 K/UL (ref 150–450)
PMV BLD AUTO: 10.1 FL (ref 9.2–12.9)
POCT GLUCOSE: 80 MG/DL (ref 70–110)
POCT GLUCOSE: 86 MG/DL (ref 70–110)
POCT GLUCOSE: 90 MG/DL (ref 70–110)
POTASSIUM SERPL-SCNC: 3.8 MMOL/L (ref 3.5–5.1)
PROT SERPL-MCNC: 6.3 G/DL (ref 6–8.4)
RBC # BLD AUTO: 4.62 M/UL (ref 4–5.4)
SODIUM SERPL-SCNC: 136 MMOL/L (ref 136–145)
WBC # BLD AUTO: 12.76 K/UL (ref 3.9–12.7)

## 2021-07-02 PROCEDURE — 11000001 HC ACUTE MED/SURG PRIVATE ROOM

## 2021-07-02 PROCEDURE — 80053 COMPREHEN METABOLIC PANEL: CPT | Performed by: OBSTETRICS & GYNECOLOGY

## 2021-07-02 PROCEDURE — 25000003 PHARM REV CODE 250: Performed by: OBSTETRICS & GYNECOLOGY

## 2021-07-02 PROCEDURE — 85025 COMPLETE CBC W/AUTO DIFF WBC: CPT | Performed by: OBSTETRICS & GYNECOLOGY

## 2021-07-02 PROCEDURE — 25000003 PHARM REV CODE 250

## 2021-07-02 PROCEDURE — 86900 BLOOD TYPING SEROLOGIC ABO: CPT | Performed by: OBSTETRICS & GYNECOLOGY

## 2021-07-02 PROCEDURE — 63600175 PHARM REV CODE 636 W HCPCS

## 2021-07-02 RX ORDER — TRANEXAMIC ACID 100 MG/ML
1000 INJECTION, SOLUTION INTRAVENOUS ONCE AS NEEDED
Status: DISCONTINUED | OUTPATIENT
Start: 2021-07-02 | End: 2021-07-03

## 2021-07-02 RX ORDER — OXYTOCIN/RINGER'S LACTATE 30/500 ML
334 PLASTIC BAG, INJECTION (ML) INTRAVENOUS ONCE
Status: DISCONTINUED | OUTPATIENT
Start: 2021-07-02 | End: 2021-07-04

## 2021-07-02 RX ORDER — METHYLERGONOVINE MALEATE 0.2 MG/ML
200 INJECTION INTRAVENOUS
Status: DISCONTINUED | OUTPATIENT
Start: 2021-07-02 | End: 2021-07-02

## 2021-07-02 RX ORDER — ONDANSETRON 8 MG/1
8 TABLET, ORALLY DISINTEGRATING ORAL EVERY 8 HOURS PRN
Status: DISCONTINUED | OUTPATIENT
Start: 2021-07-02 | End: 2021-07-03 | Stop reason: SDUPTHER

## 2021-07-02 RX ORDER — DIPHENOXYLATE HYDROCHLORIDE AND ATROPINE SULFATE 2.5; .025 MG/1; MG/1
1 TABLET ORAL 4 TIMES DAILY PRN
Status: DISCONTINUED | OUTPATIENT
Start: 2021-07-02 | End: 2021-07-06 | Stop reason: HOSPADM

## 2021-07-02 RX ORDER — MISOPROSTOL 200 UG/1
800 TABLET ORAL
Status: DISCONTINUED | OUTPATIENT
Start: 2021-07-02 | End: 2021-07-02

## 2021-07-02 RX ORDER — SODIUM CHLORIDE, SODIUM LACTATE, POTASSIUM CHLORIDE, CALCIUM CHLORIDE 600; 310; 30; 20 MG/100ML; MG/100ML; MG/100ML; MG/100ML
INJECTION, SOLUTION INTRAVENOUS CONTINUOUS
Status: DISCONTINUED | OUTPATIENT
Start: 2021-07-02 | End: 2021-07-02

## 2021-07-02 RX ORDER — ACETAMINOPHEN 500 MG
1000 TABLET ORAL
Status: DISCONTINUED | OUTPATIENT
Start: 2021-07-02 | End: 2021-07-02

## 2021-07-02 RX ORDER — CARBOPROST TROMETHAMINE 250 UG/ML
250 INJECTION, SOLUTION INTRAMUSCULAR
Status: DISCONTINUED | OUTPATIENT
Start: 2021-07-02 | End: 2021-07-02

## 2021-07-02 RX ORDER — OXYTOCIN/RINGER'S LACTATE 30/500 ML
95 PLASTIC BAG, INJECTION (ML) INTRAVENOUS CONTINUOUS
Status: DISCONTINUED | OUTPATIENT
Start: 2021-07-02 | End: 2021-07-02

## 2021-07-02 RX ORDER — OXYTOCIN/RINGER'S LACTATE 30/500 ML
95 PLASTIC BAG, INJECTION (ML) INTRAVENOUS ONCE
Status: DISCONTINUED | OUTPATIENT
Start: 2021-07-02 | End: 2021-07-03

## 2021-07-02 RX ORDER — CALCIUM CARBONATE 200(500)MG
500 TABLET,CHEWABLE ORAL 3 TIMES DAILY PRN
Status: DISCONTINUED | OUTPATIENT
Start: 2021-07-02 | End: 2021-07-06 | Stop reason: HOSPADM

## 2021-07-02 RX ORDER — MISOPROSTOL 100 MCG
25 TABLET ORAL ONCE
Status: DISCONTINUED | OUTPATIENT
Start: 2021-07-02 | End: 2021-07-06

## 2021-07-02 RX ORDER — CALCIUM CARBONATE 200(500)MG
500 TABLET,CHEWABLE ORAL 3 TIMES DAILY PRN
Status: DISCONTINUED | OUTPATIENT
Start: 2021-07-02 | End: 2021-07-02

## 2021-07-02 RX ORDER — TERBUTALINE SULFATE 1 MG/ML
0.25 INJECTION SUBCUTANEOUS
Status: DISCONTINUED | OUTPATIENT
Start: 2021-07-02 | End: 2021-07-06

## 2021-07-02 RX ORDER — METHYLERGONOVINE MALEATE 0.2 MG/ML
200 INJECTION INTRAVENOUS
Status: DISCONTINUED | OUTPATIENT
Start: 2021-07-02 | End: 2021-07-03

## 2021-07-02 RX ORDER — PROCHLORPERAZINE EDISYLATE 5 MG/ML
5 INJECTION INTRAMUSCULAR; INTRAVENOUS EVERY 6 HOURS PRN
Status: DISCONTINUED | OUTPATIENT
Start: 2021-07-02 | End: 2021-07-02

## 2021-07-02 RX ORDER — SODIUM CHLORIDE, SODIUM LACTATE, POTASSIUM CHLORIDE, CALCIUM CHLORIDE 600; 310; 30; 20 MG/100ML; MG/100ML; MG/100ML; MG/100ML
INJECTION, SOLUTION INTRAVENOUS CONTINUOUS
Status: DISCONTINUED | OUTPATIENT
Start: 2021-07-02 | End: 2021-07-04

## 2021-07-02 RX ORDER — SIMETHICONE 80 MG
1 TABLET,CHEWABLE ORAL 4 TIMES DAILY PRN
Status: DISCONTINUED | OUTPATIENT
Start: 2021-07-02 | End: 2021-07-03

## 2021-07-02 RX ORDER — CARBOPROST TROMETHAMINE 250 UG/ML
250 INJECTION, SOLUTION INTRAMUSCULAR
Status: DISCONTINUED | OUTPATIENT
Start: 2021-07-02 | End: 2021-07-03

## 2021-07-02 RX ORDER — PROCHLORPERAZINE EDISYLATE 5 MG/ML
5 INJECTION INTRAMUSCULAR; INTRAVENOUS EVERY 6 HOURS PRN
Status: DISCONTINUED | OUTPATIENT
Start: 2021-07-02 | End: 2021-07-03 | Stop reason: SDUPTHER

## 2021-07-02 RX ORDER — MISOPROSTOL 200 UG/1
800 TABLET ORAL
Status: DISCONTINUED | OUTPATIENT
Start: 2021-07-02 | End: 2021-07-06 | Stop reason: HOSPADM

## 2021-07-02 RX ORDER — OXYTOCIN/RINGER'S LACTATE 30/500 ML
334 PLASTIC BAG, INJECTION (ML) INTRAVENOUS ONCE
Status: DISCONTINUED | OUTPATIENT
Start: 2021-07-02 | End: 2021-07-02

## 2021-07-02 RX ORDER — SODIUM CHLORIDE 9 MG/ML
INJECTION, SOLUTION INTRAVENOUS
Status: DISCONTINUED | OUTPATIENT
Start: 2021-07-02 | End: 2021-07-06

## 2021-07-02 RX ORDER — DIPHENOXYLATE HYDROCHLORIDE AND ATROPINE SULFATE 2.5; .025 MG/1; MG/1
1 TABLET ORAL 4 TIMES DAILY PRN
Status: DISCONTINUED | OUTPATIENT
Start: 2021-07-02 | End: 2021-07-02

## 2021-07-02 RX ORDER — SIMETHICONE 80 MG
1 TABLET,CHEWABLE ORAL 4 TIMES DAILY PRN
Status: DISCONTINUED | OUTPATIENT
Start: 2021-07-02 | End: 2021-07-02

## 2021-07-02 RX ORDER — FAMOTIDINE 10 MG/ML
20 INJECTION INTRAVENOUS
Status: DISCONTINUED | OUTPATIENT
Start: 2021-07-02 | End: 2021-07-02

## 2021-07-02 RX ORDER — MISOPROSTOL 100 MCG
25 TABLET ORAL ONCE
Status: DISCONTINUED | OUTPATIENT
Start: 2021-07-02 | End: 2021-07-03

## 2021-07-02 RX ORDER — SODIUM CITRATE AND CITRIC ACID MONOHYDRATE 334; 500 MG/5ML; MG/5ML
30 SOLUTION ORAL
Status: DISCONTINUED | OUTPATIENT
Start: 2021-07-02 | End: 2021-07-02

## 2021-07-02 RX ORDER — OXYTOCIN/RINGER'S LACTATE 30/500 ML
95 PLASTIC BAG, INJECTION (ML) INTRAVENOUS ONCE
Status: DISCONTINUED | OUTPATIENT
Start: 2021-07-02 | End: 2021-07-06

## 2021-07-02 RX ORDER — MISOPROSTOL 100 MCG
25 TABLET ORAL EVERY 4 HOURS PRN
Status: DISCONTINUED | OUTPATIENT
Start: 2021-07-02 | End: 2021-07-06 | Stop reason: HOSPADM

## 2021-07-02 RX ORDER — ONDANSETRON 8 MG/1
8 TABLET, ORALLY DISINTEGRATING ORAL EVERY 8 HOURS PRN
Status: DISCONTINUED | OUTPATIENT
Start: 2021-07-02 | End: 2021-07-02

## 2021-07-02 RX ADMIN — Medication 25 MCG: at 10:07

## 2021-07-02 RX ADMIN — Medication 25 MCG: at 05:07

## 2021-07-02 RX ADMIN — DEXTROSE 3 MILLION UNITS: 50 INJECTION, SOLUTION INTRAVENOUS at 09:07

## 2021-07-02 RX ADMIN — SODIUM CHLORIDE, SODIUM LACTATE, POTASSIUM CHLORIDE, AND CALCIUM CHLORIDE: .6; .31; .03; .02 INJECTION, SOLUTION INTRAVENOUS at 05:07

## 2021-07-02 RX ADMIN — DEXTROSE 5 MILLION UNITS: 50 INJECTION, SOLUTION INTRAVENOUS at 05:07

## 2021-07-03 ENCOUNTER — ANESTHESIA (OUTPATIENT)
Dept: OBSTETRICS AND GYNECOLOGY | Facility: OTHER | Age: 36
End: 2021-07-03
Payer: COMMERCIAL

## 2021-07-03 ENCOUNTER — ANESTHESIA EVENT (OUTPATIENT)
Dept: OBSTETRICS AND GYNECOLOGY | Facility: OTHER | Age: 36
End: 2021-07-03
Payer: COMMERCIAL

## 2021-07-03 ENCOUNTER — PATIENT MESSAGE (OUTPATIENT)
Dept: OBSTETRICS AND GYNECOLOGY | Facility: CLINIC | Age: 36
End: 2021-07-03

## 2021-07-03 LAB
ALLENS TEST: ABNORMAL
ALLENS TEST: ABNORMAL
BASOPHILS # BLD AUTO: 0.05 K/UL (ref 0–0.2)
BASOPHILS NFR BLD: 0.3 % (ref 0–1.9)
DELSYS: ABNORMAL
DELSYS: ABNORMAL
DIFFERENTIAL METHOD: ABNORMAL
EOSINOPHIL # BLD AUTO: 0 K/UL (ref 0–0.5)
EOSINOPHIL NFR BLD: 0.1 % (ref 0–8)
ERYTHROCYTE [DISTWIDTH] IN BLOOD BY AUTOMATED COUNT: 14.6 % (ref 11.5–14.5)
HCO3 UR-SCNC: 22.7 MMOL/L (ref 24–28)
HCO3 UR-SCNC: 24.7 MMOL/L (ref 24–28)
HCT VFR BLD AUTO: 34.1 % (ref 37–48.5)
HCT VFR BLD CALC: 42 %PCV (ref 36–54)
HCT VFR BLD CALC: 45 %PCV (ref 36–54)
HGB BLD-MCNC: 11.5 G/DL (ref 12–16)
HGB BLD-MCNC: 14 G/DL
HGB BLD-MCNC: 15 G/DL
IMM GRANULOCYTES # BLD AUTO: 0.05 K/UL (ref 0–0.04)
IMM GRANULOCYTES NFR BLD AUTO: 0.3 % (ref 0–0.5)
LYMPHOCYTES # BLD AUTO: 2.4 K/UL (ref 1–4.8)
LYMPHOCYTES NFR BLD: 13.6 % (ref 18–48)
MCH RBC QN AUTO: 29.9 PG (ref 27–31)
MCHC RBC AUTO-ENTMCNC: 33.7 G/DL (ref 32–36)
MCV RBC AUTO: 89 FL (ref 82–98)
MODE: ABNORMAL
MODE: ABNORMAL
MONOCYTES # BLD AUTO: 1.1 K/UL (ref 0.3–1)
MONOCYTES NFR BLD: 6.2 % (ref 4–15)
NEUTROPHILS # BLD AUTO: 13.7 K/UL (ref 1.8–7.7)
NEUTROPHILS NFR BLD: 79.5 % (ref 38–73)
NRBC BLD-RTO: 0 /100 WBC
PCO2 BLDA: 63.1 MMHG (ref 35–45)
PCO2 BLDA: 77.9 MMHG (ref 35–45)
PH SMN: 7.11 [PH] (ref 7.35–7.45)
PH SMN: 7.16 [PH] (ref 7.35–7.45)
PLATELET # BLD AUTO: 265 K/UL (ref 150–450)
PMV BLD AUTO: 10.3 FL (ref 9.2–12.9)
PO2 BLDA: 11 MMHG (ref 80–100)
PO2 BLDA: 12 MMHG (ref 80–100)
POC BE: -5 MMOL/L
POC BE: -6 MMOL/L
POC IONIZED CALCIUM: 1.55 MMOL/L (ref 1.06–1.42)
POC IONIZED CALCIUM: 1.59 MMOL/L (ref 1.06–1.42)
POC SATURATED O2: 7 % (ref 95–100)
POC SATURATED O2: 8 % (ref 95–100)
POC TCO2: 25 MMOL/L (ref 23–27)
POC TCO2: 27 MMOL/L (ref 23–27)
POCT GLUCOSE: 104 MG/DL (ref 70–110)
POCT GLUCOSE: 116 MG/DL (ref 70–110)
POCT GLUCOSE: 134 MG/DL (ref 70–110)
POCT GLUCOSE: 147 MG/DL (ref 70–110)
POCT GLUCOSE: 151 MG/DL (ref 70–110)
POCT GLUCOSE: 168 MG/DL (ref 70–110)
POCT GLUCOSE: 192 MG/DL (ref 70–110)
POTASSIUM BLD-SCNC: 6.1 MMOL/L (ref 3.5–5.1)
POTASSIUM BLD-SCNC: 6.6 MMOL/L (ref 3.5–5.1)
RBC # BLD AUTO: 3.84 M/UL (ref 4–5.4)
SAMPLE: ABNORMAL
SAMPLE: ABNORMAL
SITE: ABNORMAL
SITE: ABNORMAL
SODIUM BLD-SCNC: 136 MMOL/L (ref 136–145)
SODIUM BLD-SCNC: 136 MMOL/L (ref 136–145)
WBC # BLD AUTO: 17.24 K/UL (ref 3.9–12.7)

## 2021-07-03 PROCEDURE — 82803 BLOOD GASES ANY COMBINATION: CPT

## 2021-07-03 PROCEDURE — 63600175 PHARM REV CODE 636 W HCPCS: Performed by: STUDENT IN AN ORGANIZED HEALTH CARE EDUCATION/TRAINING PROGRAM

## 2021-07-03 PROCEDURE — 85025 COMPLETE CBC W/AUTO DIFF WBC: CPT | Performed by: OBSTETRICS & GYNECOLOGY

## 2021-07-03 PROCEDURE — 99900035 HC TECH TIME PER 15 MIN (STAT)

## 2021-07-03 PROCEDURE — 0502F SUBSEQUENT PRENATAL CARE: CPT | Mod: ,,, | Performed by: OBSTETRICS & GYNECOLOGY

## 2021-07-03 PROCEDURE — 71000039 HC RECOVERY, EACH ADD'L HOUR: Performed by: OBSTETRICS & GYNECOLOGY

## 2021-07-03 PROCEDURE — 37000008 HC ANESTHESIA 1ST 15 MINUTES: Performed by: OBSTETRICS & GYNECOLOGY

## 2021-07-03 PROCEDURE — 0502F PR SUBSEQUENT PRENATAL CARE: ICD-10-PCS | Mod: ,,, | Performed by: OBSTETRICS & GYNECOLOGY

## 2021-07-03 PROCEDURE — 59510 PRA FULL ROUT OBSTE CARE,CESAREAN DELIV: ICD-10-PCS | Mod: QY,,, | Performed by: ANESTHESIOLOGY

## 2021-07-03 PROCEDURE — 71000033 HC RECOVERY, INTIAL HOUR: Performed by: OBSTETRICS & GYNECOLOGY

## 2021-07-03 PROCEDURE — 25000003 PHARM REV CODE 250: Performed by: STUDENT IN AN ORGANIZED HEALTH CARE EDUCATION/TRAINING PROGRAM

## 2021-07-03 PROCEDURE — 36004725 HC OB OR TIME LEV III - EA ADD 15 MIN: Performed by: OBSTETRICS & GYNECOLOGY

## 2021-07-03 PROCEDURE — 01968 PR INSERT CATH,ART,PERCUT,SHORTTERM: ICD-10-PCS | Mod: QY,,, | Performed by: ANESTHESIOLOGY

## 2021-07-03 PROCEDURE — 59510 CESAREAN DELIVERY: CPT | Mod: QY,,, | Performed by: ANESTHESIOLOGY

## 2021-07-03 PROCEDURE — 25000003 PHARM REV CODE 250

## 2021-07-03 PROCEDURE — 63600175 PHARM REV CODE 636 W HCPCS

## 2021-07-03 PROCEDURE — 27200710 HC EPIDURAL INFUSION PUMP SET: Performed by: ANESTHESIOLOGY

## 2021-07-03 PROCEDURE — 36415 COLL VENOUS BLD VENIPUNCTURE: CPT | Performed by: OBSTETRICS & GYNECOLOGY

## 2021-07-03 PROCEDURE — 37000009 HC ANESTHESIA EA ADD 15 MINS: Performed by: OBSTETRICS & GYNECOLOGY

## 2021-07-03 PROCEDURE — 59510 PR FULL ROUT OBSTE CARE,CESAREAN DELIV: ICD-10-PCS | Mod: ,,, | Performed by: OBSTETRICS & GYNECOLOGY

## 2021-07-03 PROCEDURE — 25000003 PHARM REV CODE 250: Performed by: OBSTETRICS & GYNECOLOGY

## 2021-07-03 PROCEDURE — 62326 NJX INTERLAMINAR LMBR/SAC: CPT | Performed by: STUDENT IN AN ORGANIZED HEALTH CARE EDUCATION/TRAINING PROGRAM

## 2021-07-03 PROCEDURE — 59510 CESAREAN DELIVERY: CPT | Mod: ,,, | Performed by: OBSTETRICS & GYNECOLOGY

## 2021-07-03 PROCEDURE — 01968 ANES/ANALG CS DLVR NEURAXIAL: CPT | Mod: QY,,, | Performed by: ANESTHESIOLOGY

## 2021-07-03 PROCEDURE — 36004724 HC OB OR TIME LEV III - 1ST 15 MIN: Performed by: OBSTETRICS & GYNECOLOGY

## 2021-07-03 PROCEDURE — 11000001 HC ACUTE MED/SURG PRIVATE ROOM

## 2021-07-03 PROCEDURE — C1751 CATH, INF, PER/CENT/MIDLINE: HCPCS | Performed by: ANESTHESIOLOGY

## 2021-07-03 RX ORDER — DEXAMETHASONE SODIUM PHOSPHATE 4 MG/ML
INJECTION, SOLUTION INTRA-ARTICULAR; INTRALESIONAL; INTRAMUSCULAR; INTRAVENOUS; SOFT TISSUE
Status: DISCONTINUED | OUTPATIENT
Start: 2021-07-03 | End: 2021-07-03

## 2021-07-03 RX ORDER — SODIUM CITRATE AND CITRIC ACID MONOHYDRATE 334; 500 MG/5ML; MG/5ML
SOLUTION ORAL
Status: DISPENSED
Start: 2021-07-03 | End: 2021-07-03

## 2021-07-03 RX ORDER — IBUPROFEN 200 MG
16 TABLET ORAL
Status: DISCONTINUED | OUTPATIENT
Start: 2021-07-03 | End: 2021-07-06 | Stop reason: HOSPADM

## 2021-07-03 RX ORDER — TRANEXAMIC ACID 100 MG/ML
1000 INJECTION, SOLUTION INTRAVENOUS ONCE AS NEEDED
Status: DISCONTINUED | OUTPATIENT
Start: 2021-07-03 | End: 2021-07-06 | Stop reason: HOSPADM

## 2021-07-03 RX ORDER — SODIUM BICARBONATE 1 MEQ/ML
SYRINGE (ML) INTRAVENOUS
Status: DISCONTINUED | OUTPATIENT
Start: 2021-07-03 | End: 2021-07-03

## 2021-07-03 RX ORDER — ACETAMINOPHEN 325 MG/1
650 TABLET ORAL EVERY 6 HOURS
Status: DISCONTINUED | OUTPATIENT
Start: 2021-07-03 | End: 2021-07-03 | Stop reason: SDUPTHER

## 2021-07-03 RX ORDER — IBUPROFEN 400 MG/1
800 TABLET ORAL
Status: DISCONTINUED | OUTPATIENT
Start: 2021-07-04 | End: 2021-07-06 | Stop reason: HOSPADM

## 2021-07-03 RX ORDER — DIPHENHYDRAMINE HYDROCHLORIDE 50 MG/ML
12.5 INJECTION INTRAMUSCULAR; INTRAVENOUS
Status: DISCONTINUED | OUTPATIENT
Start: 2021-07-03 | End: 2021-07-03 | Stop reason: SDUPTHER

## 2021-07-03 RX ORDER — ONDANSETRON 2 MG/ML
4 INJECTION INTRAMUSCULAR; INTRAVENOUS EVERY 6 HOURS PRN
Status: DISCONTINUED | OUTPATIENT
Start: 2021-07-03 | End: 2021-07-03 | Stop reason: SDUPTHER

## 2021-07-03 RX ORDER — ENOXAPARIN SODIUM 100 MG/ML
40 INJECTION SUBCUTANEOUS EVERY 24 HOURS
Status: DISCONTINUED | OUTPATIENT
Start: 2021-07-03 | End: 2021-07-03

## 2021-07-03 RX ORDER — KETOROLAC TROMETHAMINE 30 MG/ML
30 INJECTION, SOLUTION INTRAMUSCULAR; INTRAVENOUS
Status: DISCONTINUED | OUTPATIENT
Start: 2021-07-03 | End: 2021-07-03

## 2021-07-03 RX ORDER — HYDROCORTISONE 25 MG/G
CREAM TOPICAL 3 TIMES DAILY PRN
Status: DISCONTINUED | OUTPATIENT
Start: 2021-07-03 | End: 2021-07-03

## 2021-07-03 RX ORDER — BUPIVACAINE HYDROCHLORIDE 2.5 MG/ML
INJECTION, SOLUTION EPIDURAL; INFILTRATION; INTRACAUDAL
Status: DISPENSED
Start: 2021-07-03 | End: 2021-07-03

## 2021-07-03 RX ORDER — IBUPROFEN 400 MG/1
800 TABLET ORAL
Status: DISCONTINUED | OUTPATIENT
Start: 2021-07-04 | End: 2021-07-03

## 2021-07-03 RX ORDER — PHENYLEPHRINE HYDROCHLORIDE 10 MG/ML
INJECTION INTRAVENOUS
Status: DISCONTINUED | OUTPATIENT
Start: 2021-07-03 | End: 2021-07-03

## 2021-07-03 RX ORDER — FAMOTIDINE 10 MG/ML
20 INJECTION INTRAVENOUS
Status: DISCONTINUED | OUTPATIENT
Start: 2021-07-03 | End: 2021-07-06

## 2021-07-03 RX ORDER — PHENYLEPHRINE HCL IN 0.9% NACL 1 MG/10 ML
SYRINGE (ML) INTRAVENOUS
Status: DISCONTINUED | OUTPATIENT
Start: 2021-07-03 | End: 2021-07-03

## 2021-07-03 RX ORDER — PROCHLORPERAZINE EDISYLATE 5 MG/ML
5 INJECTION INTRAMUSCULAR; INTRAVENOUS EVERY 6 HOURS PRN
Status: DISCONTINUED | OUTPATIENT
Start: 2021-07-03 | End: 2021-07-06 | Stop reason: HOSPADM

## 2021-07-03 RX ORDER — HYDROCODONE BITARTRATE AND ACETAMINOPHEN 5; 325 MG/1; MG/1
1 TABLET ORAL EVERY 4 HOURS PRN
Status: DISCONTINUED | OUTPATIENT
Start: 2021-07-04 | End: 2021-07-06 | Stop reason: HOSPADM

## 2021-07-03 RX ORDER — KETOROLAC TROMETHAMINE 30 MG/ML
30 INJECTION, SOLUTION INTRAMUSCULAR; INTRAVENOUS
Status: COMPLETED | OUTPATIENT
Start: 2021-07-03 | End: 2021-07-03

## 2021-07-03 RX ORDER — FENTANYL CITRATE 50 UG/ML
INJECTION, SOLUTION INTRAMUSCULAR; INTRAVENOUS
Status: DISCONTINUED | OUTPATIENT
Start: 2021-07-03 | End: 2021-07-03

## 2021-07-03 RX ORDER — PRENATAL WITH FERROUS FUM AND FOLIC ACID 3080; 920; 120; 400; 22; 1.84; 3; 20; 10; 1; 12; 200; 27; 25; 2 [IU]/1; [IU]/1; MG/1; [IU]/1; MG/1; MG/1; MG/1; MG/1; MG/1; MG/1; UG/1; MG/1; MG/1; MG/1; MG/1
1 TABLET ORAL DAILY
Status: DISCONTINUED | OUTPATIENT
Start: 2021-07-03 | End: 2021-07-06 | Stop reason: HOSPADM

## 2021-07-03 RX ORDER — SODIUM CHLORIDE, SODIUM LACTATE, POTASSIUM CHLORIDE, CALCIUM CHLORIDE 600; 310; 30; 20 MG/100ML; MG/100ML; MG/100ML; MG/100ML
INJECTION, SOLUTION INTRAVENOUS CONTINUOUS
Status: DISCONTINUED | OUTPATIENT
Start: 2021-07-03 | End: 2021-07-04

## 2021-07-03 RX ORDER — MUPIROCIN 20 MG/G
OINTMENT TOPICAL
Status: DISCONTINUED | OUTPATIENT
Start: 2021-07-03 | End: 2021-07-06

## 2021-07-03 RX ORDER — CEFAZOLIN SODIUM 1 G/3ML
INJECTION, POWDER, FOR SOLUTION INTRAMUSCULAR; INTRAVENOUS
Status: DISCONTINUED | OUTPATIENT
Start: 2021-07-03 | End: 2021-07-03

## 2021-07-03 RX ORDER — SODIUM CHLORIDE, SODIUM LACTATE, POTASSIUM CHLORIDE, CALCIUM CHLORIDE 600; 310; 30; 20 MG/100ML; MG/100ML; MG/100ML; MG/100ML
INJECTION, SOLUTION INTRAVENOUS CONTINUOUS PRN
Status: DISCONTINUED | OUTPATIENT
Start: 2021-07-03 | End: 2021-07-03

## 2021-07-03 RX ORDER — MISOPROSTOL 200 UG/1
TABLET ORAL
Status: DISPENSED
Start: 2021-07-03 | End: 2021-07-03

## 2021-07-03 RX ORDER — MISOPROSTOL 200 UG/1
800 TABLET ORAL ONCE AS NEEDED
Status: DISCONTINUED | OUTPATIENT
Start: 2021-07-03 | End: 2021-07-06 | Stop reason: HOSPADM

## 2021-07-03 RX ORDER — SODIUM CITRATE AND CITRIC ACID MONOHYDRATE 334; 500 MG/5ML; MG/5ML
30 SOLUTION ORAL
Status: DISCONTINUED | OUTPATIENT
Start: 2021-07-03 | End: 2021-07-06

## 2021-07-03 RX ORDER — NALBUPHINE HYDROCHLORIDE 10 MG/ML
5 INJECTION, SOLUTION INTRAMUSCULAR; INTRAVENOUS; SUBCUTANEOUS ONCE AS NEEDED
Status: DISCONTINUED | OUTPATIENT
Start: 2021-07-03 | End: 2021-07-06 | Stop reason: HOSPADM

## 2021-07-03 RX ORDER — PROCHLORPERAZINE EDISYLATE 5 MG/ML
5 INJECTION INTRAMUSCULAR; INTRAVENOUS EVERY 6 HOURS PRN
Status: DISCONTINUED | OUTPATIENT
Start: 2021-07-03 | End: 2021-07-03 | Stop reason: SDUPTHER

## 2021-07-03 RX ORDER — SIMETHICONE 80 MG
1 TABLET,CHEWABLE ORAL EVERY 6 HOURS PRN
Status: DISCONTINUED | OUTPATIENT
Start: 2021-07-03 | End: 2021-07-03

## 2021-07-03 RX ORDER — SODIUM CITRATE AND CITRIC ACID MONOHYDRATE 334; 500 MG/5ML; MG/5ML
30 SOLUTION ORAL ONCE
Status: COMPLETED | OUTPATIENT
Start: 2021-07-03 | End: 2021-07-03

## 2021-07-03 RX ORDER — MORPHINE SULFATE 0.5 MG/ML
INJECTION, SOLUTION EPIDURAL; INTRATHECAL; INTRAVENOUS
Status: DISCONTINUED | OUTPATIENT
Start: 2021-07-03 | End: 2021-07-03

## 2021-07-03 RX ORDER — NALBUPHINE HYDROCHLORIDE 10 MG/ML
5 INJECTION, SOLUTION INTRAMUSCULAR; INTRAVENOUS; SUBCUTANEOUS ONCE AS NEEDED
Status: DISCONTINUED | OUTPATIENT
Start: 2021-07-03 | End: 2021-07-03

## 2021-07-03 RX ORDER — FAMOTIDINE 10 MG/ML
20 INJECTION INTRAVENOUS ONCE
Status: COMPLETED | OUTPATIENT
Start: 2021-07-03 | End: 2021-07-03

## 2021-07-03 RX ORDER — METHYLERGONOVINE MALEATE 0.2 MG/ML
200 INJECTION INTRAVENOUS ONCE AS NEEDED
Status: DISCONTINUED | OUTPATIENT
Start: 2021-07-03 | End: 2021-07-06 | Stop reason: HOSPADM

## 2021-07-03 RX ORDER — OXYCODONE HYDROCHLORIDE 5 MG/1
5 TABLET ORAL EVERY 4 HOURS PRN
Status: ACTIVE | OUTPATIENT
Start: 2021-07-03 | End: 2021-07-04

## 2021-07-03 RX ORDER — HYDROCODONE BITARTRATE AND ACETAMINOPHEN 5; 325 MG/1; MG/1
1 TABLET ORAL EVERY 4 HOURS PRN
Status: DISCONTINUED | OUTPATIENT
Start: 2021-07-03 | End: 2021-07-03

## 2021-07-03 RX ORDER — HYDROCODONE BITARTRATE AND ACETAMINOPHEN 10; 325 MG/1; MG/1
1 TABLET ORAL EVERY 4 HOURS PRN
Status: DISCONTINUED | OUTPATIENT
Start: 2021-07-04 | End: 2021-07-06 | Stop reason: HOSPADM

## 2021-07-03 RX ORDER — DOCUSATE SODIUM 100 MG/1
200 CAPSULE, LIQUID FILLED ORAL 2 TIMES DAILY
Status: DISCONTINUED | OUTPATIENT
Start: 2021-07-03 | End: 2021-07-03

## 2021-07-03 RX ORDER — DIPHENHYDRAMINE HYDROCHLORIDE 50 MG/ML
12.5 INJECTION INTRAMUSCULAR; INTRAVENOUS EVERY 4 HOURS PRN
Status: DISCONTINUED | OUTPATIENT
Start: 2021-07-03 | End: 2021-07-03

## 2021-07-03 RX ORDER — SIMETHICONE 80 MG
1 TABLET,CHEWABLE ORAL EVERY 6 HOURS PRN
Status: DISCONTINUED | OUTPATIENT
Start: 2021-07-03 | End: 2021-07-06 | Stop reason: HOSPADM

## 2021-07-03 RX ORDER — INSULIN ASPART 100 [IU]/ML
0-5 INJECTION, SOLUTION INTRAVENOUS; SUBCUTANEOUS
Status: DISCONTINUED | OUTPATIENT
Start: 2021-07-03 | End: 2021-07-06 | Stop reason: HOSPADM

## 2021-07-03 RX ORDER — DIPHENHYDRAMINE HCL 25 MG
25 CAPSULE ORAL EVERY 6 HOURS PRN
Status: DISCONTINUED | OUTPATIENT
Start: 2021-07-03 | End: 2021-07-03 | Stop reason: SDUPTHER

## 2021-07-03 RX ORDER — OXYTOCIN/RINGER'S LACTATE 30/500 ML
0-30 PLASTIC BAG, INJECTION (ML) INTRAVENOUS CONTINUOUS
Status: DISCONTINUED | OUTPATIENT
Start: 2021-07-03 | End: 2021-07-04

## 2021-07-03 RX ORDER — LIDOCAINE HCL/EPINEPHRINE/PF 2%-1:200K
VIAL (ML) INJECTION
Status: DISCONTINUED | OUTPATIENT
Start: 2021-07-03 | End: 2021-07-03

## 2021-07-03 RX ORDER — MISOPROSTOL 200 UG/1
800 TABLET ORAL ONCE AS NEEDED
Status: DISCONTINUED | OUTPATIENT
Start: 2021-07-03 | End: 2021-07-03

## 2021-07-03 RX ORDER — LIDOCAINE HYDROCHLORIDE AND EPINEPHRINE 15; 5 MG/ML; UG/ML
INJECTION, SOLUTION EPIDURAL
Status: DISCONTINUED | OUTPATIENT
Start: 2021-07-03 | End: 2021-07-03

## 2021-07-03 RX ORDER — ONDANSETRON 2 MG/ML
4 INJECTION INTRAMUSCULAR; INTRAVENOUS EVERY 6 HOURS PRN
Status: DISCONTINUED | OUTPATIENT
Start: 2021-07-03 | End: 2021-07-06 | Stop reason: HOSPADM

## 2021-07-03 RX ORDER — PROCHLORPERAZINE EDISYLATE 5 MG/ML
5 INJECTION INTRAMUSCULAR; INTRAVENOUS EVERY 6 HOURS PRN
Status: DISCONTINUED | OUTPATIENT
Start: 2021-07-03 | End: 2021-07-03

## 2021-07-03 RX ORDER — DIPHENHYDRAMINE HCL 25 MG
25 CAPSULE ORAL EVERY 6 HOURS PRN
Status: DISCONTINUED | OUTPATIENT
Start: 2021-07-03 | End: 2021-07-06 | Stop reason: HOSPADM

## 2021-07-03 RX ORDER — ONDANSETRON 8 MG/1
8 TABLET, ORALLY DISINTEGRATING ORAL EVERY 8 HOURS PRN
Status: DISCONTINUED | OUTPATIENT
Start: 2021-07-03 | End: 2021-07-03

## 2021-07-03 RX ORDER — IBUPROFEN 200 MG
24 TABLET ORAL
Status: DISCONTINUED | OUTPATIENT
Start: 2021-07-03 | End: 2021-07-06 | Stop reason: HOSPADM

## 2021-07-03 RX ORDER — PRENATAL WITH FERROUS FUM AND FOLIC ACID 3080; 920; 120; 400; 22; 1.84; 3; 20; 10; 1; 12; 200; 27; 25; 2 [IU]/1; [IU]/1; MG/1; [IU]/1; MG/1; MG/1; MG/1; MG/1; MG/1; MG/1; UG/1; MG/1; MG/1; MG/1; MG/1
1 TABLET ORAL DAILY
Status: DISCONTINUED | OUTPATIENT
Start: 2021-07-03 | End: 2021-07-03

## 2021-07-03 RX ORDER — GLUCAGON 1 MG
1 KIT INJECTION
Status: DISCONTINUED | OUTPATIENT
Start: 2021-07-03 | End: 2021-07-06 | Stop reason: HOSPADM

## 2021-07-03 RX ORDER — FENTANYL/BUPIVACAINE/NS/PF 2MCG/ML-.1
PLASTIC BAG, INJECTION (ML) INJECTION CONTINUOUS
Status: DISCONTINUED | OUTPATIENT
Start: 2021-07-03 | End: 2021-07-04

## 2021-07-03 RX ORDER — CARBOPROST TROMETHAMINE 250 UG/ML
250 INJECTION, SOLUTION INTRAMUSCULAR
Status: DISCONTINUED | OUTPATIENT
Start: 2021-07-03 | End: 2021-07-06 | Stop reason: HOSPADM

## 2021-07-03 RX ORDER — CARBOPROST TROMETHAMINE 250 UG/ML
250 INJECTION, SOLUTION INTRAMUSCULAR
Status: DISCONTINUED | OUTPATIENT
Start: 2021-07-03 | End: 2021-07-03 | Stop reason: SDUPTHER

## 2021-07-03 RX ORDER — ONDANSETRON 8 MG/1
8 TABLET, ORALLY DISINTEGRATING ORAL EVERY 8 HOURS PRN
Status: DISCONTINUED | OUTPATIENT
Start: 2021-07-03 | End: 2021-07-06 | Stop reason: HOSPADM

## 2021-07-03 RX ORDER — FAMOTIDINE 10 MG/ML
INJECTION INTRAVENOUS
Status: DISPENSED
Start: 2021-07-03 | End: 2021-07-03

## 2021-07-03 RX ORDER — ACETAMINOPHEN 10 MG/ML
INJECTION, SOLUTION INTRAVENOUS
Status: DISCONTINUED | OUTPATIENT
Start: 2021-07-03 | End: 2021-07-03

## 2021-07-03 RX ORDER — ACETAMINOPHEN 325 MG/1
650 TABLET ORAL
Status: DISCONTINUED | OUTPATIENT
Start: 2021-07-03 | End: 2021-07-03

## 2021-07-03 RX ORDER — OXYCODONE HYDROCHLORIDE 5 MG/1
10 TABLET ORAL EVERY 4 HOURS PRN
Status: ACTIVE | OUTPATIENT
Start: 2021-07-03 | End: 2021-07-04

## 2021-07-03 RX ORDER — ACETAMINOPHEN 500 MG
1000 TABLET ORAL
Status: DISCONTINUED | OUTPATIENT
Start: 2021-07-03 | End: 2021-07-06

## 2021-07-03 RX ORDER — FENTANYL/BUPIVACAINE/NS/PF 2MCG/ML-.1
PLASTIC BAG, INJECTION (ML) INJECTION
Status: COMPLETED
Start: 2021-07-03 | End: 2021-07-03

## 2021-07-03 RX ORDER — DOCUSATE SODIUM 100 MG/1
200 CAPSULE, LIQUID FILLED ORAL 2 TIMES DAILY
Status: DISCONTINUED | OUTPATIENT
Start: 2021-07-03 | End: 2021-07-06 | Stop reason: HOSPADM

## 2021-07-03 RX ORDER — ACETAMINOPHEN 325 MG/1
650 TABLET ORAL
Status: DISCONTINUED | OUTPATIENT
Start: 2021-07-03 | End: 2021-07-06 | Stop reason: HOSPADM

## 2021-07-03 RX ORDER — HYDROCODONE BITARTRATE AND ACETAMINOPHEN 10; 325 MG/1; MG/1
1 TABLET ORAL EVERY 4 HOURS PRN
Status: DISCONTINUED | OUTPATIENT
Start: 2021-07-03 | End: 2021-07-03

## 2021-07-03 RX ORDER — AMOXICILLIN 250 MG
1 CAPSULE ORAL NIGHTLY PRN
Status: DISCONTINUED | OUTPATIENT
Start: 2021-07-03 | End: 2021-07-03

## 2021-07-03 RX ORDER — KETOROLAC TROMETHAMINE 30 MG/ML
30 INJECTION, SOLUTION INTRAMUSCULAR; INTRAVENOUS EVERY 6 HOURS
Status: DISCONTINUED | OUTPATIENT
Start: 2021-07-03 | End: 2021-07-03

## 2021-07-03 RX ORDER — ENOXAPARIN SODIUM 100 MG/ML
40 INJECTION SUBCUTANEOUS EVERY 12 HOURS
Status: DISCONTINUED | OUTPATIENT
Start: 2021-07-04 | End: 2021-07-06 | Stop reason: HOSPADM

## 2021-07-03 RX ORDER — OXYTOCIN/RINGER'S LACTATE 30/500 ML
95 PLASTIC BAG, INJECTION (ML) INTRAVENOUS CONTINUOUS
Status: DISCONTINUED | OUTPATIENT
Start: 2021-07-03 | End: 2021-07-04

## 2021-07-03 RX ORDER — DIPHENHYDRAMINE HYDROCHLORIDE 50 MG/ML
12.5 INJECTION INTRAMUSCULAR; INTRAVENOUS
Status: DISCONTINUED | OUTPATIENT
Start: 2021-07-03 | End: 2021-07-06 | Stop reason: HOSPADM

## 2021-07-03 RX ORDER — ONDANSETRON 2 MG/ML
INJECTION INTRAMUSCULAR; INTRAVENOUS
Status: DISCONTINUED | OUTPATIENT
Start: 2021-07-03 | End: 2021-07-03

## 2021-07-03 RX ORDER — HYDROCORTISONE 25 MG/G
CREAM TOPICAL 3 TIMES DAILY PRN
Status: DISCONTINUED | OUTPATIENT
Start: 2021-07-03 | End: 2021-07-06 | Stop reason: HOSPADM

## 2021-07-03 RX ORDER — FENTANYL CITRATE 50 UG/ML
INJECTION, SOLUTION INTRAMUSCULAR; INTRAVENOUS
Status: COMPLETED
Start: 2021-07-03 | End: 2021-07-03

## 2021-07-03 RX ORDER — AMOXICILLIN 250 MG
1 CAPSULE ORAL NIGHTLY PRN
Status: DISCONTINUED | OUTPATIENT
Start: 2021-07-03 | End: 2021-07-06 | Stop reason: HOSPADM

## 2021-07-03 RX ADMIN — DOCUSATE SODIUM 200 MG: 100 CAPSULE, LIQUID FILLED ORAL at 09:07

## 2021-07-03 RX ADMIN — LIDOCAINE HYDROCHLORIDE AND EPINEPHRINE 5 ML: 20; 5 INJECTION, SOLUTION EPIDURAL; INFILTRATION; INTRACAUDAL; PERINEURAL at 04:07

## 2021-07-03 RX ADMIN — SODIUM BICARBONATE 0.25 MEQ: 84 INJECTION, SOLUTION INTRAVENOUS at 04:07

## 2021-07-03 RX ADMIN — LIDOCAINE HYDROCHLORIDE,EPINEPHRINE BITARTRATE 5 ML: 15; .005 INJECTION, SOLUTION EPIDURAL; INFILTRATION; INTRACAUDAL; PERINEURAL at 01:07

## 2021-07-03 RX ADMIN — Medication 6 UNITS: at 04:07

## 2021-07-03 RX ADMIN — ACETAMINOPHEN 1000 MG: 10 INJECTION INTRAVENOUS at 04:07

## 2021-07-03 RX ADMIN — ACETAMINOPHEN 650 MG: 325 TABLET, FILM COATED ORAL at 05:07

## 2021-07-03 RX ADMIN — PHENYLEPHRINE HYDROCHLORIDE 25 MCG/MIN: 10 INJECTION INTRAVENOUS at 04:07

## 2021-07-03 RX ADMIN — HUMAN INSULIN 8 UNITS: 100 INJECTION, SUSPENSION SUBCUTANEOUS at 07:07

## 2021-07-03 RX ADMIN — ACETAMINOPHEN 650 MG: 325 TABLET, FILM COATED ORAL at 11:07

## 2021-07-03 RX ADMIN — ONDANSETRON 4 MG: 2 INJECTION INTRAMUSCULAR; INTRAVENOUS at 04:07

## 2021-07-03 RX ADMIN — FENTANYL CITRATE 100 MCG: 50 INJECTION, SOLUTION INTRAMUSCULAR; INTRAVENOUS at 04:07

## 2021-07-03 RX ADMIN — Medication 95 MILLI-UNITS/MIN: at 05:07

## 2021-07-03 RX ADMIN — CEFAZOLIN 3 G: 330 INJECTION, POWDER, FOR SOLUTION INTRAMUSCULAR; INTRAVENOUS at 04:07

## 2021-07-03 RX ADMIN — DOCUSATE SODIUM 200 MG: 100 CAPSULE, LIQUID FILLED ORAL at 08:07

## 2021-07-03 RX ADMIN — SODIUM CITRATE AND CITRIC ACID MONOHYDRATE 30 ML: 500; 334 SOLUTION ORAL at 04:07

## 2021-07-03 RX ADMIN — Medication 4 ML: at 01:07

## 2021-07-03 RX ADMIN — DEXAMETHASONE SODIUM PHOSPHATE 4 MG: 4 INJECTION INTRA-ARTICULAR; INTRALESIONAL; INTRAMUSCULAR; INTRAVENOUS; SOFT TISSUE at 04:07

## 2021-07-03 RX ADMIN — AZITHROMYCIN MONOHYDRATE 500 MG: 500 INJECTION, POWDER, LYOPHILIZED, FOR SOLUTION INTRAVENOUS at 04:07

## 2021-07-03 RX ADMIN — Medication 3 UNITS: at 05:07

## 2021-07-03 RX ADMIN — FENTANYL CITRATE 50 MCG: 50 INJECTION, SOLUTION INTRAMUSCULAR; INTRAVENOUS at 01:07

## 2021-07-03 RX ADMIN — Medication 10 ML/HR: at 01:07

## 2021-07-03 RX ADMIN — FAMOTIDINE 20 MG: 10 INJECTION INTRAVENOUS at 04:07

## 2021-07-03 RX ADMIN — Medication 3 UNITS: at 04:07

## 2021-07-03 RX ADMIN — KETOROLAC TROMETHAMINE 30 MG: 30 INJECTION, SOLUTION INTRAMUSCULAR; INTRAVENOUS at 05:07

## 2021-07-03 RX ADMIN — SODIUM CHLORIDE, SODIUM LACTATE, POTASSIUM CHLORIDE, AND CALCIUM CHLORIDE: 600; 310; 30; 20 INJECTION, SOLUTION INTRAVENOUS at 04:07

## 2021-07-03 RX ADMIN — PHENYLEPHRINE HYDROCHLORIDE 100 MCG: 10 INJECTION INTRAVENOUS at 03:07

## 2021-07-03 RX ADMIN — DEXTROSE 3 MILLION UNITS: 50 INJECTION, SOLUTION INTRAVENOUS at 01:07

## 2021-07-03 RX ADMIN — PRENATAL VIT W/ FE FUMARATE-FA TAB 27-0.8 MG 1 TABLET: 27-0.8 TAB at 08:07

## 2021-07-03 RX ADMIN — KETOROLAC TROMETHAMINE 30 MG: 30 INJECTION, SOLUTION INTRAMUSCULAR; INTRAVENOUS at 11:07

## 2021-07-03 RX ADMIN — Medication 200 MCG: at 04:07

## 2021-07-03 RX ADMIN — MORPHINE SULFATE 1 MG: 0.5 INJECTION, SOLUTION EPIDURAL; INTRATHECAL; INTRAVENOUS at 04:07

## 2021-07-04 LAB
BASOPHILS # BLD AUTO: 0.05 K/UL (ref 0–0.2)
BASOPHILS NFR BLD: 0.4 % (ref 0–1.9)
DIFFERENTIAL METHOD: ABNORMAL
EOSINOPHIL # BLD AUTO: 0.2 K/UL (ref 0–0.5)
EOSINOPHIL NFR BLD: 1.4 % (ref 0–8)
ERYTHROCYTE [DISTWIDTH] IN BLOOD BY AUTOMATED COUNT: 14.9 % (ref 11.5–14.5)
GLUCOSE SERPL-MCNC: 84 MG/DL (ref 70–110)
HCT VFR BLD AUTO: 30.2 % (ref 37–48.5)
HGB BLD-MCNC: 9.9 G/DL (ref 12–16)
IMM GRANULOCYTES # BLD AUTO: 0.04 K/UL (ref 0–0.04)
IMM GRANULOCYTES NFR BLD AUTO: 0.3 % (ref 0–0.5)
LYMPHOCYTES # BLD AUTO: 3 K/UL (ref 1–4.8)
LYMPHOCYTES NFR BLD: 24.9 % (ref 18–48)
MCH RBC QN AUTO: 29.6 PG (ref 27–31)
MCHC RBC AUTO-ENTMCNC: 32.8 G/DL (ref 32–36)
MCV RBC AUTO: 90 FL (ref 82–98)
MONOCYTES # BLD AUTO: 0.8 K/UL (ref 0.3–1)
MONOCYTES NFR BLD: 7 % (ref 4–15)
NEUTROPHILS # BLD AUTO: 7.8 K/UL (ref 1.8–7.7)
NEUTROPHILS NFR BLD: 66 % (ref 38–73)
NRBC BLD-RTO: 0 /100 WBC
PLATELET # BLD AUTO: 216 K/UL (ref 150–450)
PMV BLD AUTO: 10.3 FL (ref 9.2–12.9)
POCT GLUCOSE: 108 MG/DL (ref 70–110)
POCT GLUCOSE: 146 MG/DL (ref 70–110)
POCT GLUCOSE: 180 MG/DL (ref 70–110)
POCT GLUCOSE: 93 MG/DL (ref 70–110)
RBC # BLD AUTO: 3.35 M/UL (ref 4–5.4)
WBC # BLD AUTO: 11.87 K/UL (ref 3.9–12.7)

## 2021-07-04 PROCEDURE — 25000003 PHARM REV CODE 250: Performed by: STUDENT IN AN ORGANIZED HEALTH CARE EDUCATION/TRAINING PROGRAM

## 2021-07-04 PROCEDURE — 85025 COMPLETE CBC W/AUTO DIFF WBC: CPT | Performed by: OBSTETRICS & GYNECOLOGY

## 2021-07-04 PROCEDURE — 63600175 PHARM REV CODE 636 W HCPCS: Performed by: STUDENT IN AN ORGANIZED HEALTH CARE EDUCATION/TRAINING PROGRAM

## 2021-07-04 PROCEDURE — 99024 PR POST-OP FOLLOW-UP VISIT: ICD-10-PCS | Mod: ,,, | Performed by: OBSTETRICS & GYNECOLOGY

## 2021-07-04 PROCEDURE — 25000003 PHARM REV CODE 250: Performed by: OBSTETRICS & GYNECOLOGY

## 2021-07-04 PROCEDURE — 11000001 HC ACUTE MED/SURG PRIVATE ROOM

## 2021-07-04 PROCEDURE — 99024 POSTOP FOLLOW-UP VISIT: CPT | Mod: ,,, | Performed by: OBSTETRICS & GYNECOLOGY

## 2021-07-04 PROCEDURE — 36415 COLL VENOUS BLD VENIPUNCTURE: CPT | Performed by: OBSTETRICS & GYNECOLOGY

## 2021-07-04 PROCEDURE — 82947 ASSAY GLUCOSE BLOOD QUANT: CPT | Performed by: OBSTETRICS & GYNECOLOGY

## 2021-07-04 RX ADMIN — IBUPROFEN 800 MG: 400 TABLET, FILM COATED ORAL at 08:07

## 2021-07-04 RX ADMIN — IBUPROFEN 800 MG: 400 TABLET, FILM COATED ORAL at 12:07

## 2021-07-04 RX ADMIN — ENOXAPARIN SODIUM 40 MG: 100 INJECTION SUBCUTANEOUS at 08:07

## 2021-07-04 RX ADMIN — DOCUSATE SODIUM 200 MG: 100 CAPSULE, LIQUID FILLED ORAL at 08:07

## 2021-07-04 RX ADMIN — PRENATAL VIT W/ FE FUMARATE-FA TAB 27-0.8 MG 1 TABLET: 27-0.8 TAB at 08:07

## 2021-07-04 RX ADMIN — HUMAN INSULIN 20 UNITS: 100 INJECTION, SUSPENSION SUBCUTANEOUS at 08:07

## 2021-07-04 RX ADMIN — IBUPROFEN 800 MG: 400 TABLET, FILM COATED ORAL at 05:07

## 2021-07-04 RX ADMIN — ACETAMINOPHEN 650 MG: 325 TABLET, FILM COATED ORAL at 05:07

## 2021-07-04 RX ADMIN — HUMAN INSULIN 8 UNITS: 100 INJECTION, SUSPENSION SUBCUTANEOUS at 05:07

## 2021-07-04 RX ADMIN — ACETAMINOPHEN 325 MG: 325 TABLET, FILM COATED ORAL at 10:07

## 2021-07-05 LAB
POCT GLUCOSE: 105 MG/DL (ref 70–110)
POCT GLUCOSE: 124 MG/DL (ref 70–110)
POCT GLUCOSE: 153 MG/DL (ref 70–110)
POCT GLUCOSE: 81 MG/DL (ref 70–110)

## 2021-07-05 PROCEDURE — 63600175 PHARM REV CODE 636 W HCPCS: Performed by: STUDENT IN AN ORGANIZED HEALTH CARE EDUCATION/TRAINING PROGRAM

## 2021-07-05 PROCEDURE — 99024 POSTOP FOLLOW-UP VISIT: CPT | Mod: ,,, | Performed by: OBSTETRICS & GYNECOLOGY

## 2021-07-05 PROCEDURE — 63600175 PHARM REV CODE 636 W HCPCS: Performed by: OBSTETRICS & GYNECOLOGY

## 2021-07-05 PROCEDURE — 25000003 PHARM REV CODE 250: Performed by: OBSTETRICS & GYNECOLOGY

## 2021-07-05 PROCEDURE — 11000001 HC ACUTE MED/SURG PRIVATE ROOM

## 2021-07-05 PROCEDURE — 99024 PR POST-OP FOLLOW-UP VISIT: ICD-10-PCS | Mod: ,,, | Performed by: OBSTETRICS & GYNECOLOGY

## 2021-07-05 PROCEDURE — 25000003 PHARM REV CODE 250: Performed by: STUDENT IN AN ORGANIZED HEALTH CARE EDUCATION/TRAINING PROGRAM

## 2021-07-05 RX ADMIN — DOCUSATE SODIUM 200 MG: 100 CAPSULE, LIQUID FILLED ORAL at 09:07

## 2021-07-05 RX ADMIN — ACETAMINOPHEN 650 MG: 325 TABLET, FILM COATED ORAL at 02:07

## 2021-07-05 RX ADMIN — IBUPROFEN 800 MG: 400 TABLET, FILM COATED ORAL at 06:07

## 2021-07-05 RX ADMIN — HUMAN INSULIN 8 UNITS: 100 INJECTION, SUSPENSION SUBCUTANEOUS at 05:07

## 2021-07-05 RX ADMIN — ENOXAPARIN SODIUM 40 MG: 100 INJECTION SUBCUTANEOUS at 09:07

## 2021-07-05 RX ADMIN — ACETAMINOPHEN 650 MG: 325 TABLET, FILM COATED ORAL at 11:07

## 2021-07-05 RX ADMIN — ENOXAPARIN SODIUM 40 MG: 100 INJECTION SUBCUTANEOUS at 10:07

## 2021-07-05 RX ADMIN — IBUPROFEN 800 MG: 400 TABLET, FILM COATED ORAL at 09:07

## 2021-07-05 RX ADMIN — DOCUSATE SODIUM 200 MG: 100 CAPSULE, LIQUID FILLED ORAL at 10:07

## 2021-07-05 RX ADMIN — ACETAMINOPHEN 650 MG: 325 TABLET, FILM COATED ORAL at 10:07

## 2021-07-05 RX ADMIN — IBUPROFEN 800 MG: 400 TABLET, FILM COATED ORAL at 02:07

## 2021-07-05 RX ADMIN — PRENATAL VIT W/ FE FUMARATE-FA TAB 27-0.8 MG 1 TABLET: 27-0.8 TAB at 10:07

## 2021-07-05 RX ADMIN — HUMAN INSULIN 22 UNITS: 100 INJECTION, SUSPENSION SUBCUTANEOUS at 10:07

## 2021-07-06 VITALS
DIASTOLIC BLOOD PRESSURE: 83 MMHG | TEMPERATURE: 98 F | BODY MASS INDEX: 54.37 KG/M2 | HEIGHT: 61 IN | WEIGHT: 288 LBS | HEART RATE: 78 BPM | RESPIRATION RATE: 16 BRPM | OXYGEN SATURATION: 98 % | SYSTOLIC BLOOD PRESSURE: 142 MMHG

## 2021-07-06 PROBLEM — O13.9 GESTATIONAL HYPERTENSION: Status: ACTIVE | Noted: 2021-07-06

## 2021-07-06 PROBLEM — O16.3 ELEVATED BLOOD PRESSURE AFFECTING PREGNANCY IN THIRD TRIMESTER, ANTEPARTUM: Status: RESOLVED | Noted: 2021-06-30 | Resolved: 2021-07-06

## 2021-07-06 LAB
POCT GLUCOSE: 103 MG/DL (ref 70–110)
POCT GLUCOSE: 157 MG/DL (ref 70–110)

## 2021-07-06 PROCEDURE — 63600175 PHARM REV CODE 636 W HCPCS: Performed by: OBSTETRICS & GYNECOLOGY

## 2021-07-06 PROCEDURE — 25000003 PHARM REV CODE 250: Performed by: STUDENT IN AN ORGANIZED HEALTH CARE EDUCATION/TRAINING PROGRAM

## 2021-07-06 PROCEDURE — 99024 PR POST-OP FOLLOW-UP VISIT: ICD-10-PCS | Mod: ,,, | Performed by: OBSTETRICS & GYNECOLOGY

## 2021-07-06 PROCEDURE — 63600175 PHARM REV CODE 636 W HCPCS: Performed by: STUDENT IN AN ORGANIZED HEALTH CARE EDUCATION/TRAINING PROGRAM

## 2021-07-06 PROCEDURE — 25000003 PHARM REV CODE 250: Performed by: OBSTETRICS & GYNECOLOGY

## 2021-07-06 PROCEDURE — 99024 POSTOP FOLLOW-UP VISIT: CPT | Mod: ,,, | Performed by: OBSTETRICS & GYNECOLOGY

## 2021-07-06 RX ORDER — HUMAN INSULIN 100 [IU]/ML
INJECTION, SUSPENSION SUBCUTANEOUS
Qty: 10 ML | Refills: 1 | Status: SHIPPED | OUTPATIENT
Start: 2021-07-06 | End: 2021-07-06 | Stop reason: SDUPTHER

## 2021-07-06 RX ORDER — IBUPROFEN 800 MG/1
800 TABLET ORAL EVERY 8 HOURS PRN
Qty: 45 TABLET | Refills: 1 | Status: SHIPPED | OUTPATIENT
Start: 2021-07-06 | End: 2021-08-27

## 2021-07-06 RX ORDER — DOCUSATE SODIUM 100 MG/1
200 CAPSULE, LIQUID FILLED ORAL 2 TIMES DAILY
Qty: 60 CAPSULE | Refills: 1 | Status: SHIPPED | OUTPATIENT
Start: 2021-07-06 | End: 2021-08-27

## 2021-07-06 RX ORDER — HYDROCODONE BITARTRATE AND ACETAMINOPHEN 5; 325 MG/1; MG/1
1 TABLET ORAL EVERY 4 HOURS PRN
Qty: 30 TABLET | Refills: 0 | Status: SHIPPED | OUTPATIENT
Start: 2021-07-06 | End: 2021-08-27

## 2021-07-06 RX ORDER — HUMAN INSULIN 100 [IU]/ML
INJECTION, SUSPENSION SUBCUTANEOUS
Qty: 10 ML | Refills: 1 | Status: SHIPPED | OUTPATIENT
Start: 2021-07-06 | End: 2021-07-06 | Stop reason: HOSPADM

## 2021-07-06 RX ORDER — FERROUS SULFATE 325(65) MG
325 TABLET ORAL DAILY
Qty: 30 TABLET | Refills: 1 | Status: SHIPPED | OUTPATIENT
Start: 2021-07-06 | End: 2021-08-27

## 2021-07-06 RX ADMIN — DOCUSATE SODIUM 200 MG: 100 CAPSULE, LIQUID FILLED ORAL at 08:07

## 2021-07-06 RX ADMIN — ENOXAPARIN SODIUM 40 MG: 100 INJECTION SUBCUTANEOUS at 08:07

## 2021-07-06 RX ADMIN — ACETAMINOPHEN 650 MG: 325 TABLET, FILM COATED ORAL at 05:07

## 2021-07-06 RX ADMIN — PRENATAL VIT W/ FE FUMARATE-FA TAB 27-0.8 MG 1 TABLET: 27-0.8 TAB at 08:07

## 2021-07-06 RX ADMIN — HUMAN INSULIN 22 UNITS: 100 INJECTION, SUSPENSION SUBCUTANEOUS at 08:07

## 2021-07-06 RX ADMIN — IBUPROFEN 800 MG: 400 TABLET, FILM COATED ORAL at 05:07

## 2021-07-07 ENCOUNTER — PATIENT MESSAGE (OUTPATIENT)
Dept: ADMINISTRATIVE | Facility: HOSPITAL | Age: 36
End: 2021-07-07

## 2021-07-09 ENCOUNTER — PATIENT MESSAGE (OUTPATIENT)
Dept: OBSTETRICS AND GYNECOLOGY | Facility: CLINIC | Age: 36
End: 2021-07-09

## 2021-07-09 ENCOUNTER — POSTPARTUM VISIT (OUTPATIENT)
Dept: OBSTETRICS AND GYNECOLOGY | Facility: CLINIC | Age: 36
End: 2021-07-09
Attending: OBSTETRICS & GYNECOLOGY
Payer: COMMERCIAL

## 2021-07-09 VITALS
DIASTOLIC BLOOD PRESSURE: 78 MMHG | HEIGHT: 61 IN | SYSTOLIC BLOOD PRESSURE: 130 MMHG | BODY MASS INDEX: 52.18 KG/M2 | WEIGHT: 276.38 LBS

## 2021-07-09 DIAGNOSIS — Z98.890 POST-OPERATIVE STATE: Primary | ICD-10-CM

## 2021-07-09 PROCEDURE — 99024 PR POST-OP FOLLOW-UP VISIT: ICD-10-PCS | Mod: S$GLB,,, | Performed by: OBSTETRICS & GYNECOLOGY

## 2021-07-09 PROCEDURE — 99024 POSTOP FOLLOW-UP VISIT: CPT | Mod: S$GLB,,, | Performed by: OBSTETRICS & GYNECOLOGY

## 2021-07-09 PROCEDURE — 99999 PR PBB SHADOW E&M-EST. PATIENT-LVL III: CPT | Mod: PBBFAC,,, | Performed by: OBSTETRICS & GYNECOLOGY

## 2021-07-09 PROCEDURE — 99999 PR PBB SHADOW E&M-EST. PATIENT-LVL III: ICD-10-PCS | Mod: PBBFAC,,, | Performed by: OBSTETRICS & GYNECOLOGY

## 2021-07-19 ENCOUNTER — IMMUNIZATION (OUTPATIENT)
Dept: INTERNAL MEDICINE | Facility: CLINIC | Age: 36
End: 2021-07-19
Payer: COMMERCIAL

## 2021-07-19 DIAGNOSIS — Z23 NEED FOR VACCINATION: Primary | ICD-10-CM

## 2021-07-19 PROCEDURE — 91300 COVID-19, MRNA, LNP-S, PF, 30 MCG/0.3 ML DOSE VACCINE: CPT | Mod: PBBFAC | Performed by: INTERNAL MEDICINE

## 2021-08-04 ENCOUNTER — PATIENT MESSAGE (OUTPATIENT)
Dept: ADMINISTRATIVE | Facility: HOSPITAL | Age: 36
End: 2021-08-04

## 2021-08-09 ENCOUNTER — IMMUNIZATION (OUTPATIENT)
Dept: INTERNAL MEDICINE | Facility: CLINIC | Age: 36
End: 2021-08-09
Payer: COMMERCIAL

## 2021-08-09 DIAGNOSIS — Z23 NEED FOR VACCINATION: Primary | ICD-10-CM

## 2021-08-09 PROCEDURE — 91300 COVID-19, MRNA, LNP-S, PF, 30 MCG/0.3 ML DOSE VACCINE: CPT | Mod: ,,, | Performed by: INTERNAL MEDICINE

## 2021-08-09 PROCEDURE — 0002A COVID-19, MRNA, LNP-S, PF, 30 MCG/0.3 ML DOSE VACCINE: CPT | Mod: CV19,,, | Performed by: INTERNAL MEDICINE

## 2021-08-09 PROCEDURE — 0002A COVID-19, MRNA, LNP-S, PF, 30 MCG/0.3 ML DOSE VACCINE: ICD-10-PCS | Mod: CV19,,, | Performed by: INTERNAL MEDICINE

## 2021-08-09 PROCEDURE — 91300 COVID-19, MRNA, LNP-S, PF, 30 MCG/0.3 ML DOSE VACCINE: ICD-10-PCS | Mod: ,,, | Performed by: INTERNAL MEDICINE

## 2021-08-20 ENCOUNTER — POSTPARTUM VISIT (OUTPATIENT)
Dept: OBSTETRICS AND GYNECOLOGY | Facility: CLINIC | Age: 36
End: 2021-08-20
Attending: OBSTETRICS & GYNECOLOGY
Payer: COMMERCIAL

## 2021-08-20 VITALS
WEIGHT: 264.56 LBS | DIASTOLIC BLOOD PRESSURE: 80 MMHG | HEIGHT: 61 IN | BODY MASS INDEX: 49.95 KG/M2 | SYSTOLIC BLOOD PRESSURE: 100 MMHG

## 2021-08-20 PROCEDURE — 99999 PR PBB SHADOW E&M-EST. PATIENT-LVL II: ICD-10-PCS | Mod: PBBFAC,,, | Performed by: OBSTETRICS & GYNECOLOGY

## 2021-08-20 PROCEDURE — 0503F PR POSTPARTUM CARE VISIT: ICD-10-PCS | Mod: CPTII,S$GLB,, | Performed by: OBSTETRICS & GYNECOLOGY

## 2021-08-20 PROCEDURE — 99999 PR PBB SHADOW E&M-EST. PATIENT-LVL II: CPT | Mod: PBBFAC,,, | Performed by: OBSTETRICS & GYNECOLOGY

## 2021-08-20 PROCEDURE — 0503F POSTPARTUM CARE VISIT: CPT | Mod: CPTII,S$GLB,, | Performed by: OBSTETRICS & GYNECOLOGY

## 2021-08-20 RX ORDER — ACETAMINOPHEN AND CODEINE PHOSPHATE 120; 12 MG/5ML; MG/5ML
1 SOLUTION ORAL DAILY
Qty: 30 TABLET | Refills: 11 | Status: SHIPPED | OUTPATIENT
Start: 2021-08-20 | End: 2022-08-17 | Stop reason: SDUPTHER

## 2021-08-27 ENCOUNTER — OFFICE VISIT (OUTPATIENT)
Dept: FAMILY MEDICINE | Facility: CLINIC | Age: 36
End: 2021-08-27
Payer: COMMERCIAL

## 2021-08-27 VITALS
WEIGHT: 262.69 LBS | RESPIRATION RATE: 16 BRPM | DIASTOLIC BLOOD PRESSURE: 72 MMHG | BODY MASS INDEX: 49.59 KG/M2 | TEMPERATURE: 98 F | OXYGEN SATURATION: 98 % | HEART RATE: 68 BPM | SYSTOLIC BLOOD PRESSURE: 114 MMHG | HEIGHT: 61 IN

## 2021-08-27 DIAGNOSIS — Z87.59 HISTORY OF GESTATIONAL HYPERTENSION: ICD-10-CM

## 2021-08-27 DIAGNOSIS — Z11.59 SCREENING FOR VIRAL DISEASE: Primary | ICD-10-CM

## 2021-08-27 DIAGNOSIS — E66.01 CLASS 3 SEVERE OBESITY DUE TO EXCESS CALORIES WITH SERIOUS COMORBIDITY AND BODY MASS INDEX (BMI) OF 45.0 TO 49.9 IN ADULT: ICD-10-CM

## 2021-08-27 DIAGNOSIS — Z86.32 HISTORY OF GESTATIONAL DIABETES: ICD-10-CM

## 2021-08-27 DIAGNOSIS — E55.9 VITAMIN D DEFICIENCY: ICD-10-CM

## 2021-08-27 PROBLEM — R73.03 PREDIABETES: Status: RESOLVED | Noted: 2020-07-10 | Resolved: 2021-08-27

## 2021-08-27 PROBLEM — E11.65 TYPE 2 DIABETES MELLITUS WITH HYPERGLYCEMIA, WITHOUT LONG-TERM CURRENT USE OF INSULIN: Status: ACTIVE | Noted: 2021-05-07

## 2021-08-27 PROBLEM — Z34.90 ENCOUNTER FOR INDUCTION OF LABOR: Status: RESOLVED | Noted: 2021-07-02 | Resolved: 2021-08-27

## 2021-08-27 PROBLEM — O09.522 MULTIGRAVIDA OF ADVANCED MATERNAL AGE IN SECOND TRIMESTER: Status: RESOLVED | Noted: 2021-02-22 | Resolved: 2021-08-27

## 2021-08-27 PROCEDURE — 99395 PREV VISIT EST AGE 18-39: CPT | Mod: S$GLB,,, | Performed by: INTERNAL MEDICINE

## 2021-08-27 PROCEDURE — 1159F MED LIST DOCD IN RCRD: CPT | Mod: CPTII,S$GLB,, | Performed by: INTERNAL MEDICINE

## 2021-08-27 PROCEDURE — 3074F PR MOST RECENT SYSTOLIC BLOOD PRESSURE < 130 MM HG: ICD-10-PCS | Mod: CPTII,S$GLB,, | Performed by: INTERNAL MEDICINE

## 2021-08-27 PROCEDURE — 99395 PR PREVENTIVE VISIT,EST,18-39: ICD-10-PCS | Mod: S$GLB,,, | Performed by: INTERNAL MEDICINE

## 2021-08-27 PROCEDURE — 3074F SYST BP LT 130 MM HG: CPT | Mod: CPTII,S$GLB,, | Performed by: INTERNAL MEDICINE

## 2021-08-27 PROCEDURE — 3078F PR MOST RECENT DIASTOLIC BLOOD PRESSURE < 80 MM HG: ICD-10-PCS | Mod: CPTII,S$GLB,, | Performed by: INTERNAL MEDICINE

## 2021-08-27 PROCEDURE — 99999 PR PBB SHADOW E&M-EST. PATIENT-LVL III: ICD-10-PCS | Mod: PBBFAC,,, | Performed by: INTERNAL MEDICINE

## 2021-08-27 PROCEDURE — 3078F DIAST BP <80 MM HG: CPT | Mod: CPTII,S$GLB,, | Performed by: INTERNAL MEDICINE

## 2021-08-27 PROCEDURE — 99999 PR PBB SHADOW E&M-EST. PATIENT-LVL III: CPT | Mod: PBBFAC,,, | Performed by: INTERNAL MEDICINE

## 2021-08-27 PROCEDURE — 3008F PR BODY MASS INDEX (BMI) DOCUMENTED: ICD-10-PCS | Mod: CPTII,S$GLB,, | Performed by: INTERNAL MEDICINE

## 2021-08-27 PROCEDURE — 1159F PR MEDICATION LIST DOCUMENTED IN MEDICAL RECORD: ICD-10-PCS | Mod: CPTII,S$GLB,, | Performed by: INTERNAL MEDICINE

## 2021-08-27 PROCEDURE — 3008F BODY MASS INDEX DOCD: CPT | Mod: CPTII,S$GLB,, | Performed by: INTERNAL MEDICINE

## 2021-08-27 RX ORDER — ACETAMINOPHEN 500 MG
1 TABLET ORAL DAILY
COMMUNITY
End: 2021-11-25 | Stop reason: CLARIF

## 2021-10-18 ENCOUNTER — PATIENT MESSAGE (OUTPATIENT)
Dept: ADMINISTRATIVE | Facility: HOSPITAL | Age: 36
End: 2021-10-18
Payer: COMMERCIAL

## 2022-02-22 ENCOUNTER — TELEPHONE (OUTPATIENT)
Dept: INTERNAL MEDICINE | Facility: CLINIC | Age: 37
End: 2022-02-22
Payer: COMMERCIAL

## 2022-02-22 NOTE — TELEPHONE ENCOUNTER
----- Message from Lucia Campo sent at 2/22/2022 12:39 PM CST -----  Contact: Pt  Type:  Sooner Appoointment Request    Caller is requesting a sooner appointment.  Caller declined first available appointment listed below.  Caller will not accept being placed on the waitlist and is requesting a message be sent to doctor.  Name of Caller:Pt  When is the first available appointment?none available  Symptoms:6 mon F/U  Would the patient rather a call back or a response via [facility]? My [facility]  Best Call Back Number  Additional Information: Pt called to reschedule appt for the following week

## 2022-04-12 ENCOUNTER — IMMUNIZATION (OUTPATIENT)
Dept: PRIMARY CARE CLINIC | Facility: CLINIC | Age: 37
End: 2022-04-12
Payer: COMMERCIAL

## 2022-04-12 DIAGNOSIS — Z23 NEED FOR VACCINATION: Primary | ICD-10-CM

## 2022-04-12 PROCEDURE — 91305 COVID-19, MRNA, LNP-S, PF, 30 MCG/0.3 ML DOSE VACCINE (PFIZER): CPT | Mod: PBBFAC | Performed by: INTERNAL MEDICINE

## 2022-04-25 ENCOUNTER — OFFICE VISIT (OUTPATIENT)
Dept: INTERNAL MEDICINE | Facility: CLINIC | Age: 37
End: 2022-04-25
Payer: COMMERCIAL

## 2022-04-25 VITALS
HEART RATE: 85 BPM | SYSTOLIC BLOOD PRESSURE: 126 MMHG | BODY MASS INDEX: 51.51 KG/M2 | WEIGHT: 262.38 LBS | OXYGEN SATURATION: 98 % | TEMPERATURE: 99 F | HEIGHT: 60 IN | DIASTOLIC BLOOD PRESSURE: 72 MMHG

## 2022-04-25 DIAGNOSIS — R05.9 COUGH: ICD-10-CM

## 2022-04-25 DIAGNOSIS — J03.90 TONSILLITIS: ICD-10-CM

## 2022-04-25 DIAGNOSIS — J01.00 ACUTE NON-RECURRENT MAXILLARY SINUSITIS: Primary | ICD-10-CM

## 2022-04-25 PROBLEM — E11.9 TYPE 2 DIABETES MELLITUS WITHOUT COMPLICATION, WITHOUT LONG-TERM CURRENT USE OF INSULIN: Status: ACTIVE | Noted: 2022-04-25

## 2022-04-25 PROCEDURE — 99214 PR OFFICE/OUTPT VISIT, EST, LEVL IV, 30-39 MIN: ICD-10-PCS | Mod: 25,S$GLB,, | Performed by: NURSE PRACTITIONER

## 2022-04-25 PROCEDURE — 96372 PR INJECTION,THERAP/PROPH/DIAG2ST, IM OR SUBCUT: ICD-10-PCS | Mod: S$GLB,,, | Performed by: NURSE PRACTITIONER

## 2022-04-25 PROCEDURE — 99999 PR PBB SHADOW E&M-EST. PATIENT-LVL III: ICD-10-PCS | Mod: PBBFAC,,, | Performed by: NURSE PRACTITIONER

## 2022-04-25 PROCEDURE — 96372 THER/PROPH/DIAG INJ SC/IM: CPT | Mod: S$GLB,,, | Performed by: NURSE PRACTITIONER

## 2022-04-25 PROCEDURE — 99214 OFFICE O/P EST MOD 30 MIN: CPT | Mod: 25,S$GLB,, | Performed by: NURSE PRACTITIONER

## 2022-04-25 PROCEDURE — 99999 PR PBB SHADOW E&M-EST. PATIENT-LVL III: CPT | Mod: PBBFAC,,, | Performed by: NURSE PRACTITIONER

## 2022-04-25 RX ORDER — TRIAMCINOLONE ACETONIDE 40 MG/ML
40 INJECTION, SUSPENSION INTRA-ARTICULAR; INTRAMUSCULAR ONCE
Status: COMPLETED | OUTPATIENT
Start: 2022-04-25 | End: 2022-04-25

## 2022-04-25 RX ORDER — AZITHROMYCIN 250 MG/1
TABLET, FILM COATED ORAL
Qty: 6 TABLET | Refills: 0 | Status: SHIPPED | OUTPATIENT
Start: 2022-04-25 | End: 2022-04-30

## 2022-04-25 RX ORDER — SPIRONOLACTONE 25 MG/1
75 TABLET ORAL DAILY
COMMUNITY
Start: 2022-04-06 | End: 2023-10-25

## 2022-04-25 RX ORDER — CLINDAMYCIN PHOSPHATE 11.9 MG/ML
SOLUTION TOPICAL
COMMUNITY
Start: 2022-04-19 | End: 2024-02-19

## 2022-04-25 RX ORDER — BROMPHENIRAMINE MALEATE, PSEUDOEPHEDRINE HYDROCHLORIDE, AND DEXTROMETHORPHAN HYDROBROMIDE 2; 30; 10 MG/5ML; MG/5ML; MG/5ML
5 SYRUP ORAL EVERY 6 HOURS PRN
Qty: 140 ML | Refills: 0 | Status: SHIPPED | OUTPATIENT
Start: 2022-04-25 | End: 2022-05-02

## 2022-04-25 RX ADMIN — TRIAMCINOLONE ACETONIDE 40 MG: 40 INJECTION, SUSPENSION INTRA-ARTICULAR; INTRAMUSCULAR at 03:04

## 2022-04-25 NOTE — PROGRESS NOTES
[facility] Note    Chief Complaint      Chief Complaint   Patient presents with    Cough     Wet cough that started Saturday. Productive with green mucus    Sore Throat    Sinus Problem     Nasal drip     History of Present Illness      Delmis Thrasher is a 36 y.o. female patient of Dr. Jamison who is new to me and presents today for c/o PND, very back sore throat, scratchy, left sinus/nasal congestion over the past week.   Has a 9 year, not breast feeding. To [employer]icks formula 44 w/o much relief.   Pt borderline diabetes, not on medication    Health Maintenance   Topic Date Due    TETANUS VACCINE  2031    Hepatitis C Screening  Completed    Lipid Panel  Completed       Past Medical History:   Diagnosis Date    Asthma     Herpes simplex virus (HSV) infection     History of gonorrhea     History of trichomoniasis 10/2018       Past Surgical History:   Procedure Laterality Date     SECTION N/A 2021    Procedure:  SECTION;  Surgeon: Joan Laureano MD;  Location: [facility] L&D;  Service: OB/GYN;  Laterality: N/A;    DILATION AND CURETTAGE OF UTERUS USING SUCTION N/A 2020    Procedure: DILATION AND CURETTAGE, UTERUS, USING SUCTION;  Surgeon: Sharri Orozco MD;  Location: [facility] OR;  Service: OB/GYN;  Laterality: N/A;    MYRINGOTOMY W/ TUBES         family history includes Diabetes in her father; Hypertension in her mother.    Social History     Tobacco Use    Smoking status: Never Smoker    Smokeless tobacco: Never Used   Substance Use Topics    Alcohol use: Yes    Drug use: No       Review of Systems   Constitutional: Positive for malaise/fatigue. Negative for chills and fever.   HENT: Positive for ear pain and sinus pain.    Eyes: Negative for blurred vision.   Respiratory: Negative for shortness of breath.    Cardiovascular: Negative for chest pain.   Gastrointestinal: Negative for nausea.   Genitourinary: Negative for dysuria.    Musculoskeletal: Negative for myalgias.   Neurological: Negative for dizziness and headaches.        Outpatient Encounter Medications as of 4/25/2022   Medication Sig Dispense Refill    clindamycin (CLEOCIN T) 1 % external solution Apply topically. Apply topically nightly as needed      norethindrone (LYLEQ) 0.35 mg tablet Take 1 tablet (0.35 mg total) by mouth once daily. 30 tablet 11    spironolactone (ALDACTONE) 25 MG tablet Take 75 mg by mouth once daily. Take 3 tabs every evening      valACYclovir (VALTREX) 500 MG tablet Take 1 tablet by mouth twice daily 30 tablet 0    azithromycin (Z-SHANNON) 250 MG tablet Take 2 tablets (500 mg total) by mouth once daily for 1 day, THEN 1 tablet (250 mg total) once daily for 4 days. 6 tablet 0    brompheniramine-pseudoeph-DM (BROMFED DM) 2-30-10 mg/5 mL Syrp Take 5 mLs by mouth every 6 (six) hours as needed (cough). 140 mL 0    [DISCONTINUED] blood-glucose meter Misc 1 each by Misc.(Non-Drug; Combo Route) route 4 (four) times daily. for 1 day 1 each 0    [DISCONTINUED] mupirocin (BACTROBAN) 2 % ointment Apply topically 3 (three) times daily. 15 g 0     Facility-Administered Encounter Medications as of 4/25/2022   Medication Dose Route Frequency Provider Last Rate Last Admin    triamcinolone acetonide injection 40 mg  40 mg Intramuscular Once Renea Fay NP            Review of patient's allergies indicates:  No Known Allergies    Physical Exam      Vital Signs  Temp: 98.9 °F (37.2 °C)  Temp src: Oral  Pulse: 85  SpO2: 98 %  BP: 126/72  BP Location: Left arm  Patient Position: Sitting  Pain Score: 0-No pain  Height and Weight  Height: 5' (152.4 cm)  Weight: 119 kg (262 lb 5.6 oz)  BSA (Calculated - sq m): 2.24 sq meters  BMI (Calculated): 51.2  Weight in (lb) to have BMI = 25: 127.7    Physical Exam  Vitals and nursing note reviewed.   Constitutional:       General: She is not in acute distress.     Appearance: Normal appearance. She is ill-appearing.   HENT:       Head: Normocephalic and atraumatic.      Ears:      Comments: Redness to bilateral ear canals, with clear effusion noted     Nose: Congestion present.      Mouth/Throat:      Pharynx: Posterior oropharyngeal erythema present.      Comments: 3+ large tonsils  Cardiovascular:      Rate and Rhythm: Regular rhythm.      Heart sounds: Normal heart sounds.   Pulmonary:      Effort: Pulmonary effort is normal. No respiratory distress.   Musculoskeletal:      Cervical back: Normal range of motion and neck supple.   Lymphadenopathy:      Cervical: Cervical adenopathy present.   Skin:     General: Skin is warm and dry.   Neurological:      Mental Status: She is alert and oriented to person, place, and time.   Psychiatric:         Mood and Affect: Mood normal.         Behavior: Behavior normal.         Thought Content: Thought content normal.         Judgment: Judgment normal.          Laboratory:  CBC:  Lab Results   Component Value Date    WBC 8.01 08/27/2021    RBC 4.60 08/27/2021    HGB 13.2 08/27/2021    HCT 40.2 08/27/2021     08/27/2021    MCV 87 08/27/2021    MCH 28.7 08/27/2021    MCHC 32.8 08/27/2021    MCHC 32.8 07/04/2021    MCHC 33.7 07/03/2021     CMP:  Lab Results   Component Value Date     (H) 08/27/2021    CALCIUM 9.2 08/27/2021    ALBUMIN 4.1 08/27/2021    PROT 7.1 08/27/2021     08/27/2021    K 4.3 08/27/2021    CO2 27 08/27/2021     08/27/2021    BUN 10 08/27/2021    ALKPHOS 66 08/27/2021    ALT 27 08/27/2021    AST 33 08/27/2021    BILITOT 0.4 08/27/2021    BILITOT 0.3 07/02/2021    BILITOT 0.3 06/30/2021     URINALYSIS:  Lab Results   Component Value Date    COLORU Yellow 07/10/2020    SPECGRAV >=1.030 (A) 07/10/2020    PHUR 6.0 07/10/2020    PROTEINUA Negative 07/10/2020    NITRITE Negative 07/10/2020    LEUKOCYTESUR Negative 07/10/2020    UROBILINOGEN Negative 07/10/2020      LIPIDS:  Lab Results   Component Value Date    TSH 1.104 12/03/2020    TSH 1.416 06/09/2020    HDL 39  (L) 08/27/2021    HDL 38 (L) 10/12/2020    CHOL 117 (L) 08/27/2021    CHOL 112 (L) 10/12/2020    TRIG 59 08/27/2021    TRIG 46 10/12/2020    LDLCALC 66.2 08/27/2021    LDLCALC 64.8 10/12/2020    CHOLHDL 33.3 08/27/2021    CHOLHDL 33.9 10/12/2020    NONHDLCHOL 78 08/27/2021    NONHDLCHOL 74 10/12/2020    TOTALCHOLEST 3.0 08/27/2021    TOTALCHOLEST 2.9 10/12/2020     TSH:  Lab Results   Component Value Date    TSH 1.104 12/03/2020    TSH 1.416 06/09/2020     A1C:  Lab Results   Component Value Date    HGBA1C 6.4 (H) 08/27/2021    HGBA1C 6.8 (H) 10/12/2020    HGBA1C 6.7 (H) 06/09/2020         Assessment/Plan     Delmis Thrasher is a 36 y.o.female with:    Acute non-recurrent maxillary sinusitis  -     triamcinolone acetonide injection 40 mg  -     brompheniramine-pseudoeph-DM (BROMFED DM) 2-30-10 mg/5 mL Syrp; Take 5 mLs by mouth every 6 (six) hours as needed (cough).  Dispense: 140 mL; Refill: 0  -     azithromycin (Z-SHANNON) 250 MG tablet; Take 2 tablets (500 mg total) by mouth once daily for 1 day, THEN 1 tablet (250 mg total) once daily for 4 days.  Dispense: 6 tablet; Refill: 0    Tonsillitis    Cough  -     triamcinolone acetonide injection 40 mg  -     brompheniramine-pseudoeph-DM (BROMFED DM) 2-30-10 mg/5 mL Syrp; Take 5 mLs by mouth every 6 (six) hours as needed (cough).  Dispense: 140 mL; Refill: 0  -     azithromycin (Z-SHANNON) 250 MG tablet; Take 2 tablets (500 mg total) by mouth once daily for 1 day, THEN 1 tablet (250 mg total) once daily for 4 days.  Dispense: 6 tablet; Refill: 0     Gargle with warm salt water  mucinex as needed  Monitor for fever  Take meds as prescribed  Complete anbx    I spent 30 minutes on the day of this encounter for preparing for, evaluating, treating, and managing this patient.      -Continue current medications and maintain follow up with specialists.  Return to clinic as needed for any concerns   No follow-ups on file.       Erin Jiminez, NP-C [facility]  location

## 2022-04-25 NOTE — PROGRESS NOTES
330 pm-After obtaining consent, and per orders of JOHNATHAN Fay, injection of triamcinolone 40 mg was given to patients left deltoid area. Patient instructed to remain in clinic for 20 minutes afterwards, and to report any adverse reaction to me immediately.    350 pm- 0 adverse side effects reported to staff.

## 2022-04-27 DIAGNOSIS — E11.9 TYPE 2 DIABETES MELLITUS WITHOUT COMPLICATION: ICD-10-CM

## 2022-05-28 ENCOUNTER — PATIENT MESSAGE (OUTPATIENT)
Dept: ADMINISTRATIVE | Facility: HOSPITAL | Age: 37
End: 2022-05-28
Payer: COMMERCIAL

## 2022-05-30 ENCOUNTER — PATIENT MESSAGE (OUTPATIENT)
Dept: ADMINISTRATIVE | Facility: HOSPITAL | Age: 37
End: 2022-05-30
Payer: COMMERCIAL

## 2022-07-07 ENCOUNTER — PATIENT MESSAGE (OUTPATIENT)
Dept: INTERNAL MEDICINE | Facility: CLINIC | Age: 37
End: 2022-07-07
Payer: COMMERCIAL

## 2022-07-11 ENCOUNTER — PATIENT MESSAGE (OUTPATIENT)
Dept: ADMINISTRATIVE | Facility: HOSPITAL | Age: 37
End: 2022-07-11
Payer: COMMERCIAL

## 2022-08-01 ENCOUNTER — PATIENT MESSAGE (OUTPATIENT)
Dept: OBSTETRICS AND GYNECOLOGY | Facility: CLINIC | Age: 37
End: 2022-08-01
Payer: COMMERCIAL

## 2022-08-11 ENCOUNTER — OFFICE VISIT (OUTPATIENT)
Dept: INTERNAL MEDICINE | Facility: CLINIC | Age: 37
End: 2022-08-11
Payer: COMMERCIAL

## 2022-08-11 VITALS
DIASTOLIC BLOOD PRESSURE: 80 MMHG | HEIGHT: 60 IN | WEIGHT: 254 LBS | HEART RATE: 63 BPM | OXYGEN SATURATION: 99 % | SYSTOLIC BLOOD PRESSURE: 128 MMHG | TEMPERATURE: 99 F | BODY MASS INDEX: 49.87 KG/M2

## 2022-08-11 DIAGNOSIS — Z00.00 ANNUAL PHYSICAL EXAM: Primary | ICD-10-CM

## 2022-08-11 DIAGNOSIS — E66.01 CLASS 3 SEVERE OBESITY DUE TO EXCESS CALORIES WITH SERIOUS COMORBIDITY AND BODY MASS INDEX (BMI) OF 50.0 TO 59.9 IN ADULT: ICD-10-CM

## 2022-08-11 DIAGNOSIS — L70.8 OTHER ACNE: ICD-10-CM

## 2022-08-11 DIAGNOSIS — E11.9 TYPE 2 DIABETES MELLITUS WITHOUT COMPLICATION, WITHOUT LONG-TERM CURRENT USE OF INSULIN: ICD-10-CM

## 2022-08-11 PROBLEM — E66.813 CLASS 3 SEVERE OBESITY DUE TO EXCESS CALORIES WITH SERIOUS COMORBIDITY AND BODY MASS INDEX (BMI) OF 50.0 TO 59.9 IN ADULT: Status: RESOLVED | Noted: 2020-07-10 | Resolved: 2022-08-11

## 2022-08-11 PROCEDURE — 3074F PR MOST RECENT SYSTOLIC BLOOD PRESSURE < 130 MM HG: ICD-10-PCS | Mod: CPTII,S$GLB,, | Performed by: INTERNAL MEDICINE

## 2022-08-11 PROCEDURE — 1159F PR MEDICATION LIST DOCUMENTED IN MEDICAL RECORD: ICD-10-PCS | Mod: CPTII,S$GLB,, | Performed by: INTERNAL MEDICINE

## 2022-08-11 PROCEDURE — 99999 PR PBB SHADOW E&M-EST. PATIENT-LVL III: ICD-10-PCS | Mod: PBBFAC,,, | Performed by: INTERNAL MEDICINE

## 2022-08-11 PROCEDURE — 3008F PR BODY MASS INDEX (BMI) DOCUMENTED: ICD-10-PCS | Mod: CPTII,S$GLB,, | Performed by: INTERNAL MEDICINE

## 2022-08-11 PROCEDURE — 3079F DIAST BP 80-89 MM HG: CPT | Mod: CPTII,S$GLB,, | Performed by: INTERNAL MEDICINE

## 2022-08-11 PROCEDURE — 3008F BODY MASS INDEX DOCD: CPT | Mod: CPTII,S$GLB,, | Performed by: INTERNAL MEDICINE

## 2022-08-11 PROCEDURE — 3079F PR MOST RECENT DIASTOLIC BLOOD PRESSURE 80-89 MM HG: ICD-10-PCS | Mod: CPTII,S$GLB,, | Performed by: INTERNAL MEDICINE

## 2022-08-11 PROCEDURE — 99395 PREV VISIT EST AGE 18-39: CPT | Mod: S$GLB,,, | Performed by: INTERNAL MEDICINE

## 2022-08-11 PROCEDURE — 99395 PR PREVENTIVE VISIT,EST,18-39: ICD-10-PCS | Mod: S$GLB,,, | Performed by: INTERNAL MEDICINE

## 2022-08-11 PROCEDURE — 99999 PR PBB SHADOW E&M-EST. PATIENT-LVL III: CPT | Mod: PBBFAC,,, | Performed by: INTERNAL MEDICINE

## 2022-08-11 PROCEDURE — 1159F MED LIST DOCD IN RCRD: CPT | Mod: CPTII,S$GLB,, | Performed by: INTERNAL MEDICINE

## 2022-08-11 PROCEDURE — 3074F SYST BP LT 130 MM HG: CPT | Mod: CPTII,S$GLB,, | Performed by: INTERNAL MEDICINE

## 2022-08-11 NOTE — PROGRESS NOTES
[facility] Note    Chief Complaint      Chief Complaint   Patient presents with    Annual Exam     History of Present Illness      Delmis Thrasher is a 37 y.o. female who presents today for annual preventative visit.  Patient comes to appointment alone.  Derm: Riya    Problem List Items Addressed This Visit     Severe obesity    Current Assessment & Plan     Has not been exercising yet.  Lost 9 pounds since last visit.           Type 2 diabetes mellitus without complication, without long-term current use of insulin    Current Assessment & Plan     A1C 6.4 in 2021, not on meds.             Relevant Orders    Microalbumin/Creatinine Ratio, Urine    Hemoglobin A1C    Other acne    Current Assessment & Plan     Sees Dr. Rtiter, stable on Aldactone on Clinda gel.             Other Visit Diagnoses     Annual physical exam    -  Primary    Relevant Orders    CBC Auto Differential    Comprehensive Metabolic Panel    Lipid Panel          Health Maintenance   Topic Date Due    Hemoglobin A1c  2022    Lipid Panel  2022    TETANUS VACCINE  2031    Hepatitis C Screening  Completed    Foot Exam  Discontinued    Eye Exam  Discontinued       Past Medical History:   Diagnosis Date    Asthma     Herpes simplex virus (HSV) infection     History of gonorrhea     History of trichomoniasis 10/2018       Past Surgical History:   Procedure Laterality Date     SECTION N/A 2021    Procedure:  SECTION;  Surgeon: Joan Laureano MD;  Location: [facility] L&D;  Service: OB/GYN;  Laterality: N/A;     SECTION  7/3/2021    DILATION AND CURETTAGE OF UTERUS USING SUCTION N/A 2020    Procedure: DILATION AND CURETTAGE, UTERUS, USING SUCTION;  Surgeon: Sharri Orozco MD;  Location: [facility] OR;  Service: OB/GYN;  Laterality: N/A;    MYRINGOTOMY W/ TUBES         family history includes Diabetes in her father; Hypertension in her  mother.    Social History     Tobacco Use    Smoking status: Never Smoker    Smokeless tobacco: Never Used   Substance Use Topics    Alcohol use: Yes     Alcohol/week: 1.0 standard drink     Types: 1 Drinks containing 0.5 oz of alcohol per week    Drug use: No       Review of Systems   Constitutional: Negative for chills and fever.   HENT: Negative for sore throat.    Respiratory: Negative for cough and shortness of breath.    Cardiovascular: Negative for chest pain and palpitations.   Gastrointestinal: Negative for constipation, diarrhea, nausea and vomiting.   Genitourinary: Negative for dysuria and hematuria.   Musculoskeletal: Negative for falls.   Neurological: Negative for headaches.        Outpatient Encounter Medications as of 8/11/2022   Medication Sig Dispense Refill    clindamycin (CLEOCIN T) 1 % external solution Apply topically. Apply topically nightly as needed      norethindrone (LYLEQ) 0.35 mg tablet Take 1 tablet (0.35 mg total) by mouth once daily. 30 tablet 11    spironolactone (ALDACTONE) 25 MG tablet Take 75 mg by mouth once daily. Take 3 tabs every evening      valACYclovir (VALTREX) 500 MG tablet Take 1 tablet by mouth twice daily 30 tablet 0     No facility-administered encounter medications on file as of 8/11/2022.        Review of patient's allergies indicates:  No Known Allergies    Physical Exam      Vital Signs  Temp: 98.7 °F (37.1 °C)  Pulse: 63  SpO2: 99 %  BP: 128/80  Pain Score: 0-No pain  Height and Weight  Height: 5' (152.4 cm)  Weight: 115.2 kg (253 lb 15.5 oz)  BSA (Calculated - sq m): 2.21 sq meters  BMI (Calculated): 49.6  Weight in (lb) to have BMI = 25: 127.7]    Physical Exam  Constitutional:       Appearance: She is well-developed.   HENT:      Head: Normocephalic and atraumatic.      Right Ear: External ear normal.      Left Ear: External ear normal.   Eyes:      General:         Right eye: No discharge.         Left eye: No discharge.   Cardiovascular:      Rate  and Rhythm: Normal rate and regular rhythm.      Heart sounds: Normal heart sounds. No murmur heard.  Pulmonary:      Effort: Pulmonary effort is normal. No respiratory distress.      Breath sounds: Normal breath sounds.   Abdominal:      General: There is no distension.      Palpations: Abdomen is soft.      Tenderness: There is no abdominal tenderness. There is no guarding.   Musculoskeletal:         General: Normal range of motion.      Cervical back: Normal range of motion.   Skin:     General: Skin is warm and dry.   Neurological:      Mental Status: She is alert and oriented to person, place, and time.   Psychiatric:         Behavior: Behavior normal.          Laboratory:  CBC:  No results for input(s): WBC, RBC, HGB, HCT, PLT, MCV, MCH, MCHC in the last 2160 hours.  CMP:  No results for input(s): GLU, CALCIUM, ALBUMIN, PROT, NA, K, CO2, CL, BUN, ALKPHOS, ALT, AST, BILITOT in the last 2160 hours.    Invalid input(s): CREATININ  URINALYSIS:  No results for input(s): COLORU, CLARITYU, SPECGRAV, PHUR, PROTEINUA, GLUCOSEU, BILIRUBINCON, BLOODU, WBCU, RBCU, BACTERIA, MUCUS, NITRITE, LEUKOCYTESUR, UROBILINOGEN, HYALINECASTS in the last 2160 hours.   LIPIDS:  No results for input(s): TSH, HDL, CHOL, TRIG, LDLCALC, CHOLHDL, NONHDLCHOL, TOTALCHOLEST in the last 2160 hours.  TSH:  No results for input(s): TSH in the last 2160 hours.  A1C:  No results for input(s): HGBA1C in the last 2160 hours.    Radiology:  No results found in the last 30 days.     Assessment/Plan     Delmis Thrasher is a 37 y.o.female with:    1. Annual physical exam  - CBC Auto Differential; Future  - Comprehensive Metabolic Panel; Future  - Lipid Panel; Future    2. Type 2 diabetes mellitus without complication, without long-term current use of insulin  - Microalbumin/Creatinine Ratio, Urine; Future  - Hemoglobin A1C; Future    3. Class 3 severe obesity due to excess calories with serious comorbidity and body mass index (BMI) of 50.0 to 59.9  in adult    4. Other acne    -labs ordered  -counseled on increasing exercise  -Continue current medications and maintain follow up with specialists.    -Follow up in about 1 year (around 8/11/2023) for Annual Visit.       Michelle Tucker MD  [facility]

## 2022-08-15 ENCOUNTER — LAB VISIT (OUTPATIENT)
Dept: LAB | Facility: HOSPITAL | Age: 37
End: 2022-08-15
Attending: INTERNAL MEDICINE
Payer: COMMERCIAL

## 2022-08-15 DIAGNOSIS — E11.9 TYPE 2 DIABETES MELLITUS WITHOUT COMPLICATION: ICD-10-CM

## 2022-08-15 DIAGNOSIS — E11.9 TYPE 2 DIABETES MELLITUS WITHOUT COMPLICATION, WITHOUT LONG-TERM CURRENT USE OF INSULIN: Primary | ICD-10-CM

## 2022-08-15 DIAGNOSIS — Z00.00 ANNUAL PHYSICAL EXAM: ICD-10-CM

## 2022-08-15 DIAGNOSIS — E11.9 TYPE 2 DIABETES MELLITUS WITHOUT COMPLICATION, WITHOUT LONG-TERM CURRENT USE OF INSULIN: ICD-10-CM

## 2022-08-15 LAB
ALBUMIN SERPL BCP-MCNC: 4 G/DL (ref 3.5–5.2)
ALP SERPL-CCNC: 47 U/L (ref 55–135)
ALT SERPL W/O P-5'-P-CCNC: 18 U/L (ref 10–44)
ANION GAP SERPL CALC-SCNC: 10 MMOL/L (ref 8–16)
AST SERPL-CCNC: 14 U/L (ref 10–40)
BASOPHILS # BLD AUTO: 0.04 K/UL (ref 0–0.2)
BASOPHILS NFR BLD: 0.5 % (ref 0–1.9)
BILIRUB SERPL-MCNC: 0.7 MG/DL (ref 0.1–1)
BUN SERPL-MCNC: 11 MG/DL (ref 6–20)
CALCIUM SERPL-MCNC: 9.2 MG/DL (ref 8.7–10.5)
CHLORIDE SERPL-SCNC: 101 MMOL/L (ref 95–110)
CHOLEST SERPL-MCNC: 89 MG/DL (ref 120–199)
CHOLEST/HDLC SERPL: 2.8 {RATIO} (ref 2–5)
CO2 SERPL-SCNC: 23 MMOL/L (ref 23–29)
CREAT SERPL-MCNC: 0.8 MG/DL (ref 0.5–1.4)
DIFFERENTIAL METHOD: NORMAL
EOSINOPHIL # BLD AUTO: 0.1 K/UL (ref 0–0.5)
EOSINOPHIL NFR BLD: 1.8 % (ref 0–8)
ERYTHROCYTE [DISTWIDTH] IN BLOOD BY AUTOMATED COUNT: 12.4 % (ref 11.5–14.5)
EST. GFR  (NO RACE VARIABLE): >60 ML/MIN/1.73 M^2
ESTIMATED AVG GLUCOSE: 246 MG/DL (ref 68–131)
ESTIMATED AVG GLUCOSE: 246 MG/DL (ref 68–131)
GLUCOSE SERPL-MCNC: 251 MG/DL (ref 70–110)
HBA1C MFR BLD: 10.2 % (ref 4–5.6)
HBA1C MFR BLD: 10.2 % (ref 4–5.6)
HCT VFR BLD AUTO: 44.4 % (ref 37–48.5)
HDLC SERPL-MCNC: 32 MG/DL (ref 40–75)
HDLC SERPL: 36 % (ref 20–50)
HGB BLD-MCNC: 14.8 G/DL (ref 12–16)
IMM GRANULOCYTES # BLD AUTO: 0.04 K/UL (ref 0–0.04)
IMM GRANULOCYTES NFR BLD AUTO: 0.5 % (ref 0–0.5)
LDLC SERPL CALC-MCNC: 42 MG/DL (ref 63–159)
LYMPHOCYTES # BLD AUTO: 2.6 K/UL (ref 1–4.8)
LYMPHOCYTES NFR BLD: 34.2 % (ref 18–48)
MCH RBC QN AUTO: 28.9 PG (ref 27–31)
MCHC RBC AUTO-ENTMCNC: 33.3 G/DL (ref 32–36)
MCV RBC AUTO: 87 FL (ref 82–98)
MONOCYTES # BLD AUTO: 0.4 K/UL (ref 0.3–1)
MONOCYTES NFR BLD: 5.4 % (ref 4–15)
NEUTROPHILS # BLD AUTO: 4.4 K/UL (ref 1.8–7.7)
NEUTROPHILS NFR BLD: 57.6 % (ref 38–73)
NONHDLC SERPL-MCNC: 57 MG/DL
NRBC BLD-RTO: 0 /100 WBC
PLATELET # BLD AUTO: 305 K/UL (ref 150–450)
PMV BLD AUTO: 10.5 FL (ref 9.2–12.9)
POTASSIUM SERPL-SCNC: 4 MMOL/L (ref 3.5–5.1)
PROT SERPL-MCNC: 7.2 G/DL (ref 6–8.4)
RBC # BLD AUTO: 5.12 M/UL (ref 4–5.4)
SODIUM SERPL-SCNC: 134 MMOL/L (ref 136–145)
TRIGL SERPL-MCNC: 75 MG/DL (ref 30–150)
WBC # BLD AUTO: 7.58 K/UL (ref 3.9–12.7)

## 2022-08-15 PROCEDURE — 80053 COMPREHEN METABOLIC PANEL: CPT | Performed by: INTERNAL MEDICINE

## 2022-08-15 PROCEDURE — 85025 COMPLETE CBC W/AUTO DIFF WBC: CPT | Performed by: INTERNAL MEDICINE

## 2022-08-15 PROCEDURE — 80061 LIPID PANEL: CPT | Performed by: INTERNAL MEDICINE

## 2022-08-15 PROCEDURE — 36415 COLL VENOUS BLD VENIPUNCTURE: CPT | Mod: PO | Performed by: INTERNAL MEDICINE

## 2022-08-15 PROCEDURE — 83036 HEMOGLOBIN GLYCOSYLATED A1C: CPT | Performed by: INTERNAL MEDICINE

## 2022-08-17 ENCOUNTER — PATIENT MESSAGE (OUTPATIENT)
Dept: INTERNAL MEDICINE | Facility: CLINIC | Age: 37
End: 2022-08-17
Payer: COMMERCIAL

## 2022-08-17 DIAGNOSIS — E11.9 TYPE 2 DIABETES MELLITUS WITHOUT COMPLICATION, WITHOUT LONG-TERM CURRENT USE OF INSULIN: Primary | ICD-10-CM

## 2022-08-17 RX ORDER — SEMAGLUTIDE 1.34 MG/ML
INJECTION, SOLUTION SUBCUTANEOUS
Qty: 1 PEN | Refills: 0 | Status: SHIPPED | OUTPATIENT
Start: 2022-08-17 | End: 2023-03-24

## 2022-08-17 NOTE — TELEPHONE ENCOUNTER
Ozempic denied....    This medicine is covered only if:  You have a history of suboptimal response, contraindication or intolerance to metformin (generic  Glucophage, Glucophage XR).

## 2022-08-25 ENCOUNTER — OFFICE VISIT (OUTPATIENT)
Dept: PRIMARY CARE CLINIC | Facility: CLINIC | Age: 37
End: 2022-08-25
Payer: COMMERCIAL

## 2022-08-25 DIAGNOSIS — R09.81 SINUS CONGESTION: ICD-10-CM

## 2022-08-25 DIAGNOSIS — J30.2 SEASONAL ALLERGIES: Primary | ICD-10-CM

## 2022-08-25 PROCEDURE — 3066F PR DOCUMENTATION OF TREATMENT FOR NEPHROPATHY: ICD-10-PCS | Mod: CPTII,95,, | Performed by: NURSE PRACTITIONER

## 2022-08-25 PROCEDURE — 3046F HEMOGLOBIN A1C LEVEL >9.0%: CPT | Mod: CPTII,95,, | Performed by: NURSE PRACTITIONER

## 2022-08-25 PROCEDURE — 1160F PR REVIEW ALL MEDS BY PRESCRIBER/CLIN PHARMACIST DOCUMENTED: ICD-10-PCS | Mod: CPTII,95,, | Performed by: NURSE PRACTITIONER

## 2022-08-25 PROCEDURE — 99213 OFFICE O/P EST LOW 20 MIN: CPT | Mod: 95,,, | Performed by: NURSE PRACTITIONER

## 2022-08-25 PROCEDURE — 99213 PR OFFICE/OUTPT VISIT, EST, LEVL III, 20-29 MIN: ICD-10-PCS | Mod: 95,,, | Performed by: NURSE PRACTITIONER

## 2022-08-25 PROCEDURE — 3066F NEPHROPATHY DOC TX: CPT | Mod: CPTII,95,, | Performed by: NURSE PRACTITIONER

## 2022-08-25 PROCEDURE — 1159F PR MEDICATION LIST DOCUMENTED IN MEDICAL RECORD: ICD-10-PCS | Mod: CPTII,95,, | Performed by: NURSE PRACTITIONER

## 2022-08-25 PROCEDURE — 3061F NEG MICROALBUMINURIA REV: CPT | Mod: CPTII,95,, | Performed by: NURSE PRACTITIONER

## 2022-08-25 PROCEDURE — 1159F MED LIST DOCD IN RCRD: CPT | Mod: CPTII,95,, | Performed by: NURSE PRACTITIONER

## 2022-08-25 PROCEDURE — 1160F RVW MEDS BY RX/DR IN RCRD: CPT | Mod: CPTII,95,, | Performed by: NURSE PRACTITIONER

## 2022-08-25 PROCEDURE — 3046F PR MOST RECENT HEMOGLOBIN A1C LEVEL > 9.0%: ICD-10-PCS | Mod: CPTII,95,, | Performed by: NURSE PRACTITIONER

## 2022-08-25 PROCEDURE — 3061F PR NEG MICROALBUMINURIA RESULT DOCUMENTED/REVIEW: ICD-10-PCS | Mod: CPTII,95,, | Performed by: NURSE PRACTITIONER

## 2022-08-25 RX ORDER — CETIRIZINE HYDROCHLORIDE 10 MG/1
10 TABLET ORAL DAILY
Qty: 30 TABLET | Refills: 11 | Status: SHIPPED | OUTPATIENT
Start: 2022-08-25 | End: 2022-10-11

## 2022-08-25 RX ORDER — FLUTICASONE PROPIONATE 50 MCG
1 SPRAY, SUSPENSION (ML) NASAL DAILY
Qty: 1 G | Refills: 1 | Status: SHIPPED | OUTPATIENT
Start: 2022-08-25

## 2022-08-25 RX ORDER — METHYLPREDNISOLONE 4 MG/1
TABLET ORAL
Qty: 21 EACH | Refills: 0 | Status: SHIPPED | OUTPATIENT
Start: 2022-08-25 | End: 2022-09-15

## 2022-08-25 NOTE — PROGRESS NOTES
[facility] Note    Chief Complaint    sinus congestion, postnasal drip    History of Present Illness      Delmis Thrasher is a 37 y.o. female who presents today for postnasal drip, sinus congestion, cough. She reports symptoms occurred after she was swimming 6 days ago. She has not tried any treatments to resolve symptoms yet.She denies any SOB, chest pain, N/V, unintentional weight loss, loss of appetite, fatigue, diarrhea, constipation. She is active daily and remains independent with ADL's.      Problem List Items Addressed This Visit    None     Visit Diagnoses     Seasonal allergies    -  Primary    Relevant Medications    cetirizine (ZYRTEC) 10 MG tablet    Sinus congestion        Relevant Medications    methylPREDNISolone (MEDROL DOSEPACK) 4 mg tablet    fluticasone propionate (FLONASE) 50 mcg/actuation nasal spray          Review of Systems   Constitutional: Negative.    HENT: Positive for congestion and sinus pain.    Eyes: Negative.    Respiratory: Positive for cough.    Cardiovascular: Negative.    Gastrointestinal: Negative.    Genitourinary: Negative.    Musculoskeletal: Negative.    Skin: Negative.    Neurological: Negative.    Endo/Heme/Allergies: Negative.    Psychiatric/Behavioral: Negative.         Past Medical History:  Past Medical History:   Diagnosis Date    Asthma     Herpes simplex virus (HSV) infection     History of gonorrhea     History of trichomoniasis 10/2018       Past Surgical History:  Past Surgical History:   Procedure Laterality Date     SECTION N/A 2021    Procedure:  SECTION;  Surgeon: Joan Laureano MD;  Location: [facility] L&D;  Service: OB/GYN;  Laterality: N/A;     SECTION  7/3/2021    DILATION AND CURETTAGE OF UTERUS USING SUCTION N/A 2020    Procedure: DILATION AND CURETTAGE, UTERUS, USING SUCTION;  Surgeon: Sharri Orozco MD;  Location: [facility] OR;  Service: OB/GYN;  Laterality: N/A;    MYRINGOTOMY  W/ TUBES  1984       Family History:  family history includes Diabetes in her father; Hypertension in her mother.   Family history was reviewed with patient.    Social History:  Social History     Socioeconomic History    Marital status: Single   Tobacco Use    Smoking status: Never Smoker    Smokeless tobacco: Never Used   Substance and Sexual Activity    Alcohol use: Yes     Alcohol/week: 1.0 standard drink     Types: 1 Drinks containing 0.5 oz of alcohol per week    Drug use: No    Sexual activity: Yes     Partners: Male     Birth control/protection: OCP     Comment: Single:          Medications:  Outpatient Encounter Medications as of 8/25/2022   Medication Sig Dispense Refill    cetirizine (ZYRTEC) 10 MG tablet Take 1 tablet (10 mg total) by mouth once daily. 30 tablet 11    clindamycin (CLEOCIN T) 1 % external solution Apply topically. Apply topically nightly as needed      fluticasone propionate (FLONASE) 50 mcg/actuation nasal spray 1 spray (50 mcg total) by Each Nostril route once daily. 1 g 1    methylPREDNISolone (MEDROL DOSEPACK) 4 mg tablet use as directed 21 each 0    norethindrone (LYLEQ) 0.35 mg tablet Take 1 tablet (0.35 mg total) by mouth once daily. 30 tablet 3    semaglutide (OZEMPIC) 0.25 mg or 0.5 mg(2 mg/1.5 mL) pen injector Inject 0.25 mg subcutaneously once weekly x2 weeks then 0.5 mg once weekly thereafter 1 pen 0    spironolactone (ALDACTONE) 25 MG tablet Take 75 mg by mouth once daily. Take 3 tabs every evening      valACYclovir (VALTREX) 500 MG tablet Take 1 tablet by mouth twice daily 30 tablet 0     No facility-administered encounter medications on file as of 8/25/2022.       Allergies:  Review of patient's allergies indicates:  No Known Allergies    Health Maintenance:  Health Maintenance   Topic Date Due    Hemoglobin A1c  11/15/2022    Lipid Panel  08/15/2023    TETANUS VACCINE  11/25/2031    Hepatitis C Screening  Completed    Foot Exam  Discontinued    Eye Exam   Discontinued     Health Maintenance Topics with due status: Not Due       Topic Last Completion Date    Cervical Cancer Screening 10/15/2019    Influenza Vaccine 11/04/2021    TETANUS VACCINE 11/25/2021    Diabetes Urine Screening 08/15/2022    Lipid Panel 08/15/2022    Hemoglobin A1c 08/15/2022       Physical Exam       ]        Laboratory:  CBC:  Recent Labs   Lab 07/04/21  0729 08/27/21  1108 08/15/22  0935   WBC 11.87 8.01 7.58   RBC 3.35 L 4.60 5.12   Hemoglobin 9.9 L 13.2 14.8   Hematocrit 30.2 L 40.2 44.4   Platelets 216 312 305   MCV 90 87 87   MCH 29.6 28.7 28.9   MCHC 32.8 32.8 33.3     CMP:  Recent Labs   Lab 07/02/21  1841 08/27/21  1108 08/15/22  0935   Glucose 105 154 H 251 H   Calcium 9.1 9.2 9.2   Albumin 2.5 L 4.1 4.0   Total Protein 6.3 7.1 7.2   Sodium 136 138 134 L   Potassium 3.8 4.3 4.0   CO2 19 L 27 23   Chloride 108 106 101   BUN 11 10 11   Alkaline Phosphatase 128 66 47 L   ALT 14 27 18   AST 13 33 14   Total Bilirubin 0.3 0.4 0.7     URINALYSIS:  Recent Labs   Lab 07/10/20  0939   Color, UA Yellow   Specific Gravity, UA >=1.030 A   pH, UA 6.0   Protein, UA Negative   Nitrite, UA Negative   Leukocytes, UA Negative   Urobilinogen, UA Negative      LIPIDS:  Recent Labs   Lab 06/09/20  1712 10/12/20  0822 12/03/20  1610 08/27/21  1108 08/15/22  0935   TSH 1.416  --  1.104  --   --    HDL  --  38 L  --  39 L 32 L   Cholesterol  --  112 L  --  117 L 89 L   Triglycerides  --  46  --  59 75   LDL Cholesterol  --  64.8  --  66.2 42.0 L   HDL/Cholesterol Ratio  --  33.9  --  33.3 36.0   Non-HDL Cholesterol  --  74  --  78 57   Total Cholesterol/HDL Ratio  --  2.9  --  3.0 2.8     TSH:  Recent Labs   Lab 06/09/20  1712 12/03/20  1610   TSH 1.416 1.104     A1C:  Recent Labs   Lab 06/09/20  1713 10/12/20  0822 08/27/21  1108 08/15/22  0935   Hemoglobin A1C 6.7 H 6.8 H 6.4 H 10.2 H  10.2 H       Radiology:        Assessment/Plan     Delmis Thrasher is a 37 y.o.female with:    Seasonal  allergies  -     cetirizine (ZYRTEC) 10 MG tablet; Take 1 tablet (10 mg total) by mouth once daily.  Dispense: 30 tablet; Refill: 11    Sinus congestion  -     methylPREDNISolone (MEDROL DOSEPACK) 4 mg tablet; use as directed  Dispense: 21 each; Refill: 0  -     fluticasone propionate (FLONASE) 50 mcg/actuation nasal spray; 1 spray (50 mcg total) by Each Nostril route once daily.  Dispense: 1 g; Refill: 1    - saline nasal spray to each nostril as needed.    As above, continue current medications and maintain follow up with specialists.  Return to clinic as needed.    I spent 10 minutes on the day of this virtual encounter for preparing, evaluating, treating, and discussing plan of care with this patient.  Greater than 50% of this time was spent face to face via virtual visit with patient.  All questions were answered to patient's satisfaction.      Karen L Spencer, NP-C [facility]                  Answers for HPI/ROS submitted by the patient on 8/25/2022  Chronicity: new  Onset: yesterday  Progression since onset: unchanged  Frequency: every few hours  Cough characteristics: productive of sputum  nasal congestion: Yes  postnasal drip: Yes  Aggravated by: exercise  asthma: Yes  pneumonia: Yes  Treatments tried: nothing  Improvement on treatment: no relief

## 2022-09-20 ENCOUNTER — PATIENT MESSAGE (OUTPATIENT)
Dept: INTERNAL MEDICINE | Facility: CLINIC | Age: 37
End: 2022-09-20
Payer: COMMERCIAL

## 2022-09-21 ENCOUNTER — PATIENT MESSAGE (OUTPATIENT)
Dept: INTERNAL MEDICINE | Facility: CLINIC | Age: 37
End: 2022-09-21
Payer: COMMERCIAL

## 2022-09-21 RX ORDER — METFORMIN HYDROCHLORIDE 500 MG/1
500 TABLET, EXTENDED RELEASE ORAL
Qty: 90 TABLET | Refills: 3 | Status: SHIPPED | OUTPATIENT
Start: 2022-09-21 | End: 2023-03-24

## 2022-10-04 ENCOUNTER — OFFICE VISIT (OUTPATIENT)
Dept: OBSTETRICS AND GYNECOLOGY | Facility: CLINIC | Age: 37
End: 2022-10-04
Attending: OBSTETRICS & GYNECOLOGY
Payer: COMMERCIAL

## 2022-10-04 VITALS
WEIGHT: 251.31 LBS | SYSTOLIC BLOOD PRESSURE: 112 MMHG | BODY MASS INDEX: 49.34 KG/M2 | HEIGHT: 60 IN | DIASTOLIC BLOOD PRESSURE: 80 MMHG

## 2022-10-04 DIAGNOSIS — Z11.51 SCREENING FOR HUMAN PAPILLOMAVIRUS: ICD-10-CM

## 2022-10-04 DIAGNOSIS — Z01.419 ENCOUNTER FOR GYNECOLOGICAL EXAMINATION (GENERAL) (ROUTINE) WITHOUT ABNORMAL FINDINGS: ICD-10-CM

## 2022-10-04 DIAGNOSIS — Z12.31 VISIT FOR SCREENING MAMMOGRAM: ICD-10-CM

## 2022-10-04 DIAGNOSIS — Z01.419 WELL FEMALE EXAM WITH ROUTINE GYNECOLOGICAL EXAM: ICD-10-CM

## 2022-10-04 DIAGNOSIS — R53.83 FATIGUE, UNSPECIFIED TYPE: Primary | ICD-10-CM

## 2022-10-04 PROCEDURE — 3074F SYST BP LT 130 MM HG: CPT | Mod: CPTII,S$GLB,, | Performed by: OBSTETRICS & GYNECOLOGY

## 2022-10-04 PROCEDURE — 3061F NEG MICROALBUMINURIA REV: CPT | Mod: CPTII,S$GLB,, | Performed by: OBSTETRICS & GYNECOLOGY

## 2022-10-04 PROCEDURE — 3079F DIAST BP 80-89 MM HG: CPT | Mod: CPTII,S$GLB,, | Performed by: OBSTETRICS & GYNECOLOGY

## 2022-10-04 PROCEDURE — 3008F BODY MASS INDEX DOCD: CPT | Mod: CPTII,S$GLB,, | Performed by: OBSTETRICS & GYNECOLOGY

## 2022-10-04 PROCEDURE — 99395 PR PREVENTIVE VISIT,EST,18-39: ICD-10-PCS | Mod: S$GLB,,, | Performed by: OBSTETRICS & GYNECOLOGY

## 2022-10-04 PROCEDURE — 3061F PR NEG MICROALBUMINURIA RESULT DOCUMENTED/REVIEW: ICD-10-PCS | Mod: CPTII,S$GLB,, | Performed by: OBSTETRICS & GYNECOLOGY

## 2022-10-04 PROCEDURE — 3066F NEPHROPATHY DOC TX: CPT | Mod: CPTII,S$GLB,, | Performed by: OBSTETRICS & GYNECOLOGY

## 2022-10-04 PROCEDURE — 99395 PREV VISIT EST AGE 18-39: CPT | Mod: S$GLB,,, | Performed by: OBSTETRICS & GYNECOLOGY

## 2022-10-04 PROCEDURE — 3074F PR MOST RECENT SYSTOLIC BLOOD PRESSURE < 130 MM HG: ICD-10-PCS | Mod: CPTII,S$GLB,, | Performed by: OBSTETRICS & GYNECOLOGY

## 2022-10-04 PROCEDURE — 3008F PR BODY MASS INDEX (BMI) DOCUMENTED: ICD-10-PCS | Mod: CPTII,S$GLB,, | Performed by: OBSTETRICS & GYNECOLOGY

## 2022-10-04 PROCEDURE — 99999 PR PBB SHADOW E&M-EST. PATIENT-LVL III: CPT | Mod: PBBFAC,,, | Performed by: OBSTETRICS & GYNECOLOGY

## 2022-10-04 PROCEDURE — 88175 CYTOPATH C/V AUTO FLUID REDO: CPT | Performed by: OBSTETRICS & GYNECOLOGY

## 2022-10-04 PROCEDURE — 3046F PR MOST RECENT HEMOGLOBIN A1C LEVEL > 9.0%: ICD-10-PCS | Mod: CPTII,S$GLB,, | Performed by: OBSTETRICS & GYNECOLOGY

## 2022-10-04 PROCEDURE — 99999 PR PBB SHADOW E&M-EST. PATIENT-LVL III: ICD-10-PCS | Mod: PBBFAC,,, | Performed by: OBSTETRICS & GYNECOLOGY

## 2022-10-04 PROCEDURE — 3046F HEMOGLOBIN A1C LEVEL >9.0%: CPT | Mod: CPTII,S$GLB,, | Performed by: OBSTETRICS & GYNECOLOGY

## 2022-10-04 PROCEDURE — 3079F PR MOST RECENT DIASTOLIC BLOOD PRESSURE 80-89 MM HG: ICD-10-PCS | Mod: CPTII,S$GLB,, | Performed by: OBSTETRICS & GYNECOLOGY

## 2022-10-04 PROCEDURE — 3066F PR DOCUMENTATION OF TREATMENT FOR NEPHROPATHY: ICD-10-PCS | Mod: CPTII,S$GLB,, | Performed by: OBSTETRICS & GYNECOLOGY

## 2022-10-04 PROCEDURE — 87624 HPV HI-RISK TYP POOLED RSLT: CPT | Performed by: OBSTETRICS & GYNECOLOGY

## 2022-10-04 RX ORDER — VALACYCLOVIR HYDROCHLORIDE 500 MG/1
500 TABLET, FILM COATED ORAL DAILY
Qty: 30 TABLET | Refills: 0 | Status: SHIPPED | OUTPATIENT
Start: 2022-10-04 | End: 2022-11-03

## 2022-10-04 RX ORDER — NYSTATIN AND TRIAMCINOLONE ACETONIDE 100000; 1 [USP'U]/G; MG/G
CREAM TOPICAL
Qty: 30 G | Refills: 1 | Status: SHIPPED | OUTPATIENT
Start: 2022-10-04 | End: 2024-02-19

## 2022-10-05 ENCOUNTER — PATIENT MESSAGE (OUTPATIENT)
Dept: OBSTETRICS AND GYNECOLOGY | Facility: CLINIC | Age: 37
End: 2022-10-05
Payer: COMMERCIAL

## 2022-10-09 NOTE — PROGRESS NOTES
Subjective:       Patient ID: Delmis Thrasher is a 37 y.o. female.    Chief Complaint:  Well Woman      History of Present Illness  37 year old here for annual.  Discussed lifestyle and healthy living.  Reports vulvar irritation.  No pelvic pain.  No breast concerns.  Moods stable.  Reports increased fatigue     GYN & OB History  No LMP recorded. (Menstrual status: Birth Control).   Date of Last Pap: 10/5/2022    OB History    Para Term  AB Living   2 1 1 0 1 1   SAB IAB Ectopic Multiple Live Births   1 0 0 0 1      # Outcome Date GA Lbr Sonny/2nd Weight Sex Delivery Anes PTL Lv   2 Term 21 38w0d  3.22 kg (7 lb 1.6 oz) F CS-LTranv Spinal, EPI N JEFFY      Complications: Fetal Intolerance   1 SAB 2020 6w0d             Obstetric Comments   Menarche 89       Past Medical History:   Diagnosis Date    Asthma     Herpes simplex virus (HSV) infection     History of gonorrhea     History of trichomoniasis 10/2018       Past Surgical History:   Procedure Laterality Date     SECTION N/A 2021    Procedure:  SECTION;  Surgeon: Joan Laureano MD;  Location: [facility] L&D;  Service: OB/GYN;  Laterality: N/A;     SECTION  7/3/2021    DILATION AND CURETTAGE OF UTERUS USING SUCTION N/A 2020    Procedure: DILATION AND CURETTAGE, UTERUS, USING SUCTION;  Surgeon: Sharri Orozco MD;  Location: [facility] OR;  Service: OB/GYN;  Laterality: N/A;    MYRINGOTOMY W/ TUBES         Review of Systems  Review of Systems   Constitutional:  Positive for fatigue and unexpected weight change.   Respiratory:  Negative for shortness of breath.    Cardiovascular:  Negative for chest pain.   Gastrointestinal:  Negative for abdominal pain, constipation, diarrhea and nausea.   Genitourinary:  Negative for dyspareunia, dysuria, menstrual problem and pelvic pain.        Vulvar irritation   Musculoskeletal:  Negative for back pain.   Skin:  Negative for rash.   Neurological:  Negative for  headaches.   Hematological:  Negative for adenopathy.   Psychiatric/Behavioral:  Negative for dysphoric mood. The patient is not nervous/anxious.       Objective:   Physical Exam:   Constitutional: She is oriented to person, place, and time. She appears well-developed and well-nourished.      Neck: No thyromegaly present.     Pulmonary/Chest: Effort normal. Right breast exhibits no mass, no nipple discharge, no skin change, no tenderness and no bleeding. Left breast exhibits no mass, no nipple discharge, no skin change, no tenderness and no bleeding.        Abdominal: Soft. Bowel sounds are normal. She exhibits no distension and no mass. There is no abdominal tenderness. There is no rebound and no guarding.     Genitourinary:    Vagina and uterus normal.      Pelvic exam was performed with patient supine.   There is no rash, tenderness, lesion or injury on the right labia. There is no rash, tenderness, lesion or injury on the left labia. Cervix is normal. Right adnexum displays no mass, no tenderness and no fullness. Left adnexum displays no mass, no tenderness and no fullness. No erythema,  no vaginal discharge, tenderness, rectocele, cystocele or unspecified prolapse of vaginal walls in the vagina.    No signs of injury in the vagina.   Cervix exhibits no motion tenderness, no discharge and no friability. Uterus is not enlarged, not fixed and not tender.    Genitourinary Comments: Multiple areas of erythema and skin break down c/w yeast              Musculoskeletal: Normal range of motion and moves all extremeties.      Lymphadenopathy:        Right: No supraclavicular adenopathy present.        Left: No supraclavicular adenopathy present.    Neurological: She is alert and oriented to person, place, and time.    Skin: Skin is warm and dry.    Psychiatric: She has a normal mood and affect. Her behavior is normal. Judgment normal.      Assessment/ Plan:     Fatigue, unspecified type  -     Estradiol; Future;  Expected date: 10/04/2022  -     TSH; Future; Expected date: 10/04/2022  -     T4, Free; Future; Expected date: 10/04/2022  -     DHEA-Sulfate; Future; Expected date: 10/04/2022  -     VITAMIN B12; Future; Expected date: 10/04/2022  -     Hemoglobin A1C; Future    Visit for screening mammogram  -     Mammo Digital Screening Bilat w/ Dayne; Future; Expected date: 10/04/2022    Well female exam with routine gynecological exam  -     Liquid-Based Pap Smear, Screening    Screening for human papillomavirus  -     HPV High Risk Genotypes, PCR    Encounter for gynecological examination (general) (routine) without abnormal findings    Other orders  -     valACYclovir (VALTREX) 500 MG tablet; Take 1 tablet (500 mg total) by mouth once daily.  Dispense: 30 tablet; Refill: 0  -     nystatin-triamcinolone (MYCOLOG II) cream; Apply to affected area 2 times daily  Dispense: 30 g; Refill: 1         No follow-ups on file.    Patient was counseled today on A.C.S. Pap guidelines and recommendations for yearly pelvic exams, mammograms and monthly self breast exams; to see her PCP for other health maintenance.

## 2022-10-10 RX ORDER — ACETAMINOPHEN AND CODEINE PHOSPHATE 120; 12 MG/5ML; MG/5ML
1 SOLUTION ORAL DAILY
Qty: 30 TABLET | Refills: 11 | Status: SHIPPED | OUTPATIENT
Start: 2022-10-10 | End: 2023-10-25

## 2022-10-11 LAB
FINAL PATHOLOGIC DIAGNOSIS: NORMAL
HPV HR 12 DNA SPEC QL NAA+PROBE: NEGATIVE
HPV16 AG SPEC QL: NEGATIVE
HPV18 DNA SPEC QL NAA+PROBE: NEGATIVE
Lab: NORMAL

## 2022-10-11 RX ORDER — FLUCONAZOLE 150 MG/1
150 TABLET ORAL ONCE
Qty: 1 TABLET | Refills: 1 | Status: SHIPPED | OUTPATIENT
Start: 2022-10-11 | End: 2022-10-11

## 2022-10-18 ENCOUNTER — LAB VISIT (OUTPATIENT)
Dept: LAB | Facility: HOSPITAL | Age: 37
End: 2022-10-18
Attending: OBSTETRICS & GYNECOLOGY
Payer: COMMERCIAL

## 2022-10-18 DIAGNOSIS — R53.83 FATIGUE, UNSPECIFIED TYPE: ICD-10-CM

## 2022-10-18 LAB
DHEA-S SERPL-MCNC: 158.6 UG/DL (ref 74.8–410.2)
ESTIMATED AVG GLUCOSE: 252 MG/DL (ref 68–131)
ESTRADIOL SERPL-MCNC: 189 PG/ML
HBA1C MFR BLD: 10.4 % (ref 4–5.6)
T4 FREE SERPL-MCNC: 0.91 NG/DL (ref 0.71–1.51)
TSH SERPL DL<=0.005 MIU/L-ACNC: 1.21 UIU/ML (ref 0.4–4)
VIT B12 SERPL-MCNC: 344 PG/ML (ref 210–950)

## 2022-10-18 PROCEDURE — 82627 DEHYDROEPIANDROSTERONE: CPT | Performed by: OBSTETRICS & GYNECOLOGY

## 2022-10-18 PROCEDURE — 83036 HEMOGLOBIN GLYCOSYLATED A1C: CPT | Performed by: OBSTETRICS & GYNECOLOGY

## 2022-10-18 PROCEDURE — 84439 ASSAY OF FREE THYROXINE: CPT | Performed by: OBSTETRICS & GYNECOLOGY

## 2022-10-18 PROCEDURE — 82670 ASSAY OF TOTAL ESTRADIOL: CPT | Performed by: OBSTETRICS & GYNECOLOGY

## 2022-10-18 PROCEDURE — 84443 ASSAY THYROID STIM HORMONE: CPT | Performed by: OBSTETRICS & GYNECOLOGY

## 2022-10-18 PROCEDURE — 82607 VITAMIN B-12: CPT | Performed by: OBSTETRICS & GYNECOLOGY

## 2022-10-25 ENCOUNTER — APPOINTMENT (OUTPATIENT)
Dept: RADIOLOGY | Facility: OTHER | Age: 37
End: 2022-10-25
Attending: OBSTETRICS & GYNECOLOGY
Payer: COMMERCIAL

## 2022-10-25 VITALS — HEIGHT: 60 IN | WEIGHT: 251.31 LBS | BODY MASS INDEX: 49.34 KG/M2

## 2022-10-25 DIAGNOSIS — Z12.31 VISIT FOR SCREENING MAMMOGRAM: ICD-10-CM

## 2022-10-25 PROCEDURE — 77067 MAMMO DIGITAL SCREENING BILAT WITH TOMO: ICD-10-PCS | Mod: 26,,, | Performed by: RADIOLOGY

## 2022-10-25 PROCEDURE — 77067 SCR MAMMO BI INCL CAD: CPT | Mod: 26,,, | Performed by: RADIOLOGY

## 2022-10-25 PROCEDURE — 77063 MAMMO DIGITAL SCREENING BILAT WITH TOMO: ICD-10-PCS | Mod: 26,,, | Performed by: RADIOLOGY

## 2022-10-25 PROCEDURE — 77063 BREAST TOMOSYNTHESIS BI: CPT | Mod: TC,PN

## 2022-10-25 PROCEDURE — 77067 SCR MAMMO BI INCL CAD: CPT | Mod: TC,PN

## 2022-10-25 PROCEDURE — 77063 BREAST TOMOSYNTHESIS BI: CPT | Mod: 26,,, | Performed by: RADIOLOGY

## 2022-10-27 ENCOUNTER — PATIENT MESSAGE (OUTPATIENT)
Dept: OBSTETRICS AND GYNECOLOGY | Facility: CLINIC | Age: 37
End: 2022-10-27
Payer: COMMERCIAL

## 2022-11-17 ENCOUNTER — PATIENT MESSAGE (OUTPATIENT)
Dept: INTERNAL MEDICINE | Facility: CLINIC | Age: 37
End: 2022-11-17
Payer: COMMERCIAL

## 2023-02-15 DIAGNOSIS — E11.9 TYPE 2 DIABETES MELLITUS WITHOUT COMPLICATION: ICD-10-CM

## 2023-02-20 ENCOUNTER — PATIENT MESSAGE (OUTPATIENT)
Dept: ADMINISTRATIVE | Facility: HOSPITAL | Age: 38
End: 2023-02-20
Payer: COMMERCIAL

## 2023-03-24 ENCOUNTER — TELEPHONE (OUTPATIENT)
Dept: INTERNAL MEDICINE | Facility: CLINIC | Age: 38
End: 2023-03-24

## 2023-03-24 ENCOUNTER — OFFICE VISIT (OUTPATIENT)
Dept: INTERNAL MEDICINE | Facility: CLINIC | Age: 38
End: 2023-03-24
Payer: COMMERCIAL

## 2023-03-24 ENCOUNTER — LAB VISIT (OUTPATIENT)
Dept: LAB | Facility: HOSPITAL | Age: 38
End: 2023-03-24
Attending: INTERNAL MEDICINE
Payer: COMMERCIAL

## 2023-03-24 VITALS
TEMPERATURE: 98 F | WEIGHT: 264.56 LBS | DIASTOLIC BLOOD PRESSURE: 80 MMHG | HEIGHT: 60 IN | RESPIRATION RATE: 16 BRPM | HEART RATE: 66 BPM | SYSTOLIC BLOOD PRESSURE: 130 MMHG | BODY MASS INDEX: 51.94 KG/M2 | OXYGEN SATURATION: 99 %

## 2023-03-24 DIAGNOSIS — E11.9 TYPE 2 DIABETES MELLITUS WITHOUT COMPLICATION, WITHOUT LONG-TERM CURRENT USE OF INSULIN: ICD-10-CM

## 2023-03-24 DIAGNOSIS — E66.01 MORBID OBESITY WITH BMI OF 50.0-59.9, ADULT: ICD-10-CM

## 2023-03-24 DIAGNOSIS — E11.9 TYPE 2 DIABETES MELLITUS WITHOUT COMPLICATION, WITHOUT LONG-TERM CURRENT USE OF INSULIN: Primary | ICD-10-CM

## 2023-03-24 LAB
ANION GAP SERPL CALC-SCNC: 9 MMOL/L (ref 8–16)
BUN SERPL-MCNC: 9 MG/DL (ref 6–20)
CALCIUM SERPL-MCNC: 9.4 MG/DL (ref 8.7–10.5)
CHLORIDE SERPL-SCNC: 101 MMOL/L (ref 95–110)
CO2 SERPL-SCNC: 25 MMOL/L (ref 23–29)
CREAT SERPL-MCNC: 0.7 MG/DL (ref 0.5–1.4)
EST. GFR  (NO RACE VARIABLE): >60 ML/MIN/1.73 M^2
ESTIMATED AVG GLUCOSE: 252 MG/DL (ref 68–131)
GLUCOSE SERPL-MCNC: 202 MG/DL (ref 70–110)
HBA1C MFR BLD: 10.4 % (ref 4–5.6)
POTASSIUM SERPL-SCNC: 3.9 MMOL/L (ref 3.5–5.1)
SODIUM SERPL-SCNC: 135 MMOL/L (ref 136–145)

## 2023-03-24 PROCEDURE — 1160F RVW MEDS BY RX/DR IN RCRD: CPT | Mod: CPTII,S$GLB,, | Performed by: INTERNAL MEDICINE

## 2023-03-24 PROCEDURE — 1159F PR MEDICATION LIST DOCUMENTED IN MEDICAL RECORD: ICD-10-PCS | Mod: CPTII,S$GLB,, | Performed by: INTERNAL MEDICINE

## 2023-03-24 PROCEDURE — 3008F PR BODY MASS INDEX (BMI) DOCUMENTED: ICD-10-PCS | Mod: CPTII,S$GLB,, | Performed by: INTERNAL MEDICINE

## 2023-03-24 PROCEDURE — 90471 IMMUNIZATION ADMIN: CPT | Mod: S$GLB,,, | Performed by: INTERNAL MEDICINE

## 2023-03-24 PROCEDURE — 80048 BASIC METABOLIC PNL TOTAL CA: CPT | Performed by: INTERNAL MEDICINE

## 2023-03-24 PROCEDURE — 1159F MED LIST DOCD IN RCRD: CPT | Mod: CPTII,S$GLB,, | Performed by: INTERNAL MEDICINE

## 2023-03-24 PROCEDURE — 99213 OFFICE O/P EST LOW 20 MIN: CPT | Mod: 25,S$GLB,, | Performed by: INTERNAL MEDICINE

## 2023-03-24 PROCEDURE — 3008F BODY MASS INDEX DOCD: CPT | Mod: CPTII,S$GLB,, | Performed by: INTERNAL MEDICINE

## 2023-03-24 PROCEDURE — 3075F PR MOST RECENT SYSTOLIC BLOOD PRESS GE 130-139MM HG: ICD-10-PCS | Mod: CPTII,S$GLB,, | Performed by: INTERNAL MEDICINE

## 2023-03-24 PROCEDURE — 36415 COLL VENOUS BLD VENIPUNCTURE: CPT | Mod: PO | Performed by: INTERNAL MEDICINE

## 2023-03-24 PROCEDURE — 90677 PNEUMOCOCCAL CONJUGATE VACCINE 20-VALENT: ICD-10-PCS | Mod: S$GLB,,, | Performed by: INTERNAL MEDICINE

## 2023-03-24 PROCEDURE — 3075F SYST BP GE 130 - 139MM HG: CPT | Mod: CPTII,S$GLB,, | Performed by: INTERNAL MEDICINE

## 2023-03-24 PROCEDURE — 3079F PR MOST RECENT DIASTOLIC BLOOD PRESSURE 80-89 MM HG: ICD-10-PCS | Mod: CPTII,S$GLB,, | Performed by: INTERNAL MEDICINE

## 2023-03-24 PROCEDURE — 1160F PR REVIEW ALL MEDS BY PRESCRIBER/CLIN PHARMACIST DOCUMENTED: ICD-10-PCS | Mod: CPTII,S$GLB,, | Performed by: INTERNAL MEDICINE

## 2023-03-24 PROCEDURE — 83036 HEMOGLOBIN GLYCOSYLATED A1C: CPT | Performed by: INTERNAL MEDICINE

## 2023-03-24 PROCEDURE — 90471 PNEUMOCOCCAL CONJUGATE VACCINE 20-VALENT: ICD-10-PCS | Mod: S$GLB,,, | Performed by: INTERNAL MEDICINE

## 2023-03-24 PROCEDURE — 90677 PCV20 VACCINE IM: CPT | Mod: S$GLB,,, | Performed by: INTERNAL MEDICINE

## 2023-03-24 PROCEDURE — 99999 PR PBB SHADOW E&M-EST. PATIENT-LVL V: ICD-10-PCS | Mod: PBBFAC,,, | Performed by: INTERNAL MEDICINE

## 2023-03-24 PROCEDURE — 99213 PR OFFICE/OUTPT VISIT, EST, LEVL III, 20-29 MIN: ICD-10-PCS | Mod: 25,S$GLB,, | Performed by: INTERNAL MEDICINE

## 2023-03-24 PROCEDURE — 3079F DIAST BP 80-89 MM HG: CPT | Mod: CPTII,S$GLB,, | Performed by: INTERNAL MEDICINE

## 2023-03-24 PROCEDURE — 99999 PR PBB SHADOW E&M-EST. PATIENT-LVL V: CPT | Mod: PBBFAC,,, | Performed by: INTERNAL MEDICINE

## 2023-03-24 RX ORDER — ERTUGLIFLOZIN 5 MG/1
5 TABLET, FILM COATED ORAL DAILY
Qty: 30 TABLET | Refills: 2 | Status: SHIPPED | OUTPATIENT
Start: 2023-03-24 | End: 2023-05-29 | Stop reason: SDUPTHER

## 2023-03-24 RX ORDER — LANCETS
1 EACH MISCELLANEOUS 2 TIMES DAILY
Qty: 100 EACH | Refills: 5 | Status: SHIPPED | OUTPATIENT
Start: 2023-03-24

## 2023-03-24 RX ORDER — INSULIN PUMP SYRINGE, 3 ML
1 EACH MISCELLANEOUS ONCE
Qty: 1 EACH | Refills: 0 | Status: SHIPPED | OUTPATIENT
Start: 2023-03-24 | End: 2024-03-15

## 2023-03-24 RX ORDER — SEMAGLUTIDE 1.34 MG/ML
0.25 INJECTION, SOLUTION SUBCUTANEOUS
Qty: 1 EACH | Refills: 1 | Status: SHIPPED | OUTPATIENT
Start: 2023-03-24 | End: 2023-03-27

## 2023-03-24 NOTE — PROGRESS NOTES
Subjective:     Delmis Thrasher is a 37 y.o. female who presents for   Chief Complaint   Patient presents with    [facility]    Diabetes       HPI    Annual was on 8/2022, Dr. Michelle Tucker    Diabetes: Patient presents for follow up of diabetes. Current symptoms include: none. Symptoms have been basically asymptomatic. Patient denies foot ulcerations, paresthesia of the feet, and visual disturbances. Evaluation to date has included: hemoglobin A1C.  Home sugars: patient does not check sugars. Current treatment:  metformin- causes abdominal pain and she has been trying to just deal with it .     Lab Results   Component Value Date    HGBA1C 10.4 (H) 10/18/2022     (H) 08/15/2022       Body mass index is 51.67 kg/m².  Wt Readings from Last 6 Encounters:   03/24/23 120 kg (264 lb 8.8 oz)   10/25/22 114 kg (251 lb 5.2 oz)   10/04/22 114 kg (251 lb 5.2 oz)   08/11/22 115.2 kg (253 lb 15.5 oz)   04/25/22 119 kg (262 lb 5.6 oz)   11/25/21 124.7 kg (275 lb)   - overweight as a child, developed diabetes after having a baby  - likes pasta, rice, bread, meat & potatoes, ice cream, cold milk as treat  - works and takes care of her daughter and it leaves little time for meal planning  - eats oatmeal or raisin bread for breakfast, leftovers for lunch, little time for dinner so she sometimes skips dinner and eats only snacks  - loves Mexican food      Review of Systems   Constitutional:  Negative for chills, diaphoresis and fever.   HENT:  Negative for congestion, ear pain, rhinorrhea and sinus pressure.    Eyes:  Negative for discharge and visual disturbance.   Respiratory:  Negative for cough, chest tightness and shortness of breath.    Cardiovascular:  Negative for chest pain, palpitations and leg swelling.   Gastrointestinal:  Negative for abdominal pain, constipation, diarrhea, nausea and vomiting.   Endocrine: Negative for polydipsia and polyuria.   Musculoskeletal:  Negative for arthralgias and  myalgias.   Skin:  Negative for rash and wound.   Neurological:  Negative for dizziness, tremors, numbness and headaches.        Objective:     Physical Exam  Vitals reviewed.   Constitutional:       General: She is awake. She is not in acute distress.     Appearance: Normal appearance. She is well-developed and well-groomed.   HENT:      Head: Normocephalic and atraumatic.      Right Ear: Hearing and external ear normal.      Left Ear: Hearing and external ear normal.      Nose: Nose normal. No congestion.      Mouth/Throat:      Mouth: Mucous membranes are moist.   Eyes:      General: Lids are normal. Vision grossly intact.   Cardiovascular:      Rate and Rhythm: Normal rate and regular rhythm.      Heart sounds: Normal heart sounds. No murmur heard.  Pulmonary:      Effort: Pulmonary effort is normal.      Breath sounds: Normal breath sounds. No decreased breath sounds or wheezing.   Abdominal:      General: Bowel sounds are normal. There is no distension.   Musculoskeletal:         General: Normal range of motion.      Cervical back: Normal range of motion.      Right lower leg: No edema.      Left lower leg: No edema.   Skin:     General: Skin is warm and dry.      Findings: No lesion or rash.   Neurological:      Mental Status: She is alert and oriented to person, place, and time.   Psychiatric:         Attention and Perception: Attention normal.         Mood and Affect: Mood normal.         Behavior: Behavior is cooperative.          Assessment:      1. Type 2 diabetes mellitus without complication, without long-term current use of insulin    2. Morbid obesity with BMI of 50.0-59.9, adult           Plan:     1. Type 2 diabetes mellitus without complication, without long-term current use of insulin  - poorly controlled, last HbA1c was 10.4, unable to get ozempic, has been trying to tolerate metformin but it causes abdominal pain  - stop metformin, start Steglatro (due to cost) and will re-order Ozempic (PA  required)  - semaglutide (OZEMPIC) 0.25 mg or 0.5 mg(2 mg/1.5 mL) pen injector; Inject 0.25 mg into the skin every 7 days.  Dispense: 1 each; Refill: 1  - return to diabetes educators to review eating habits  - Ambulatory referral/consult to Diabetes Education; Future  - Hemoglobin A1C; Future  - Basic Metabolic Panel; Future  - ordered glucometer and supplies    2. Morbid obesity with BMI of 50.0-59.9, adult  - discussed a few quick meal plans, she should research keto-friendly Mexican recipes, do not skip meals  - start walking with child in nearby park 2x weekly    RTC in 3 months for follow-up or sooner if needed    __________________________    Oliva Donnelly MD, PharmD  [facility]  American Board of Obesity Medicine diplomate  Office [phone]

## 2023-03-24 NOTE — TELEPHONE ENCOUNTER
PA for Ozempic was approved on 3/24/23 on [facility]. Pt was called and Notified that PA was approve for Ozempic 0.25 mg .

## 2023-03-24 NOTE — TELEPHONE ENCOUNTER
Pt is schedule 6/29/23 for 3 mos F/U DM 2 at 1 pm.Pt. appointment letter was printed will be mailed out.

## 2023-03-26 ENCOUNTER — PATIENT MESSAGE (OUTPATIENT)
Dept: INTERNAL MEDICINE | Facility: CLINIC | Age: 38
End: 2023-03-26
Payer: COMMERCIAL

## 2023-03-27 ENCOUNTER — TELEPHONE (OUTPATIENT)
Dept: INTERNAL MEDICINE | Facility: CLINIC | Age: 38
End: 2023-03-27
Payer: COMMERCIAL

## 2023-03-27 ENCOUNTER — PATIENT MESSAGE (OUTPATIENT)
Dept: INTERNAL MEDICINE | Facility: CLINIC | Age: 38
End: 2023-03-27
Payer: COMMERCIAL

## 2023-03-27 DIAGNOSIS — E11.9 TYPE 2 DIABETES MELLITUS WITHOUT COMPLICATION, WITHOUT LONG-TERM CURRENT USE OF INSULIN: Primary | ICD-10-CM

## 2023-03-27 RX ORDER — SEMAGLUTIDE 0.68 MG/ML
0.25 INJECTION, SOLUTION SUBCUTANEOUS
Qty: 3 ML | Refills: 0 | Status: SHIPPED | OUTPATIENT
Start: 2023-03-27 | End: 2023-05-06 | Stop reason: SDUPTHER

## 2023-03-27 NOTE — TELEPHONE ENCOUNTER
semaglutide (OZEMPIC) 0.25 mg or 0.5 mg(2 mg/1.5 mL) pen injector 1 each 1 3/24/2023  No   Sig - Route: Inject 0.25 mg into the skin every 7 days. - Subcutaneous   Sent to pharmacy as: semaglutide (OZEMPIC) 0.25 mg or 0.5 mg(2 mg/1.5 mL) pen injector   Class: Normal   Order: [id number]   Date/Time Signed: 3/24/2023 13:56       E-Prescribing Status: Receipt confirmed by pharmacy (3/24/2023  1:56 PM CDT)   Prior authorization: Approved       You sent above rx 3/24.  [facility] is faxing us request to change to 2mg/3ml pen because it is  Now available.    Ok to change rx? If so, please send to [facility].    Thanks baudilio

## 2023-03-27 NOTE — TELEPHONE ENCOUNTER
Spoke with the pt she has not went to the pharmacy to see how much everything is , she has completed the savings card. Ozempic is approved through her insurance, she will call back if she has ant problems.

## 2023-03-27 NOTE — TELEPHONE ENCOUNTER
Does she have any SOB, facial swelling or tongue swelling? Any itching? And I do not see hives. Is the area of redness enlarging?    This appears to be an injection site reaction. It does not seem to be an allergy or skin infection. The discomfort can be relieved by cold compresses and/or use of OTC NSAID like Advil for discomfort and swelling if needed.

## 2023-03-27 NOTE — TELEPHONE ENCOUNTER
Pa for steglatro thru epic was denied.  I answered questions this am.  See below link.  Thanks baudilio    Request Reference Number: [id number]. STEGLATRO TAB 5MG is denied for not meeting the prior authorization requirement(s). Details of this decision are in the notice attached below or have been faxed to you. Appeals are not supported through ePA. Please refer to the fax case notice for appeals information and instructions. Case ID: [id number]      Payer:  [employer] Patient's Payer    [phone]    Electronic appeal:  Not supported   Prior auth initiated by: [facility] [address] [place] [address] [employer]    [phone]    View History     Prior Authorization Request Evaluation Questions: For which indication is the medication being prescribed?  diabetes   Prior Authorization Request Evaluation Questions: What is the start date?  3/27/2023   Prior Authorization Request Evaluation Questions: What medication(s) has the member tried and failed?  Novolin, metformin   Prior Authorization Request Evaluation Questions: Are there any supporting labs or test results?  Aic 6.4 8/21. Aic 8/22 10.2 , Aic 10.4 3/23   Prior Authorization Request Evaluation Questions: Please document any other information which supports the request.  Uncontrolled diabetes . Allergy to metformin

## 2023-03-27 NOTE — TELEPHONE ENCOUNTER
Fax received from [facility] asking us to get prior auth for steglatro..  We also had message in epic for prior auth.    I answered questions and waiting on response in epic.    Dx dm 2  e11.9  Tried novolin 4/21- 7/21  Metformin 500mg  9/22-3/23 allergic.    On ozempic & steglatro prescribed 3/23.  Thanks baudilio

## 2023-03-27 NOTE — TELEPHONE ENCOUNTER
Pt received the pneumonia vac on Friday, her arm is swollen, warm to touch and red pictures attaached.

## 2023-03-27 NOTE — TELEPHONE ENCOUNTER
Please ask her if she completed the savings card. Have her go to the pharmacy and ask how much the medication will cost if she just pays for it with the 's savings discount.

## 2023-04-10 ENCOUNTER — CLINICAL SUPPORT (OUTPATIENT)
Dept: DIABETES | Facility: CLINIC | Age: 38
End: 2023-04-10
Payer: COMMERCIAL

## 2023-04-10 DIAGNOSIS — E11.9 TYPE 2 DIABETES MELLITUS WITHOUT COMPLICATION, WITHOUT LONG-TERM CURRENT USE OF INSULIN: ICD-10-CM

## 2023-04-10 DIAGNOSIS — E11.65 TYPE 2 DIABETES MELLITUS WITH HYPERGLYCEMIA, WITHOUT LONG-TERM CURRENT USE OF INSULIN: Primary | ICD-10-CM

## 2023-04-10 PROCEDURE — G0108 DIAB MANAGE TRN  PER INDIV: HCPCS | Mod: S$GLB,,, | Performed by: DIETITIAN, REGISTERED

## 2023-04-10 PROCEDURE — G0108 PR DIAB MANAGE TRN  PER INDIV: ICD-10-PCS | Mod: S$GLB,,, | Performed by: DIETITIAN, REGISTERED

## 2023-04-10 PROCEDURE — 99999 PR PBB SHADOW E&M-EST. PATIENT-LVL I: ICD-10-PCS | Mod: PBBFAC,,, | Performed by: DIETITIAN, REGISTERED

## 2023-04-10 PROCEDURE — 99999 PR PBB SHADOW E&M-EST. PATIENT-LVL I: CPT | Mod: PBBFAC,,, | Performed by: DIETITIAN, REGISTERED

## 2023-04-10 NOTE — PROGRESS NOTES
Diabetes Care Specialist Progress Note  Author: Renea Mcneil RD, CDE  Date: 4/10/2023    Program Intake  Reason for Diabetes Program Visit:: Initial Diabetes Assessment  Current diabetes risk level:: high  In the last 12 months, have you:: none    Lab Results   Component Value Date    HGBA1C 10.4 (H) 03/24/2023       Clinical    Problem Review  Reviewed Problem List with Patient: yes  Active comorbidities affecting diabetes self-care.: no  Reviewed health maintenance: yes    Clinical Assessment  Current Diabetes Treatment: Injectable  Have you ever experienced hypoglycemia (low blood sugar)?: no  Have you ever experienced hyperglycemia (high blood sugar)?: yes  Are you able to tell when your blood sugar is high?: No (comment)  Have you ever been hospitalized because your blood sugar was high?: no    Medication Information  How do you obtain your medications?: Patient drives  How many days a week do you miss your medications?: Never  Do you sometimes have difficulty refilling your medications?: No  Medication adherence impacting ability to self-manage diabetes?: No    Labs  Do you have regular lab work to monitor your medications?: Yes  Type of Regular Lab Work: A1c    Nutritional Status  Diet: Regular (2-3 meals daily plus snacks; drinks water, coke zero, coffee, milk)  Change in appetite?: Yes (With Ozempic)  Dentation:: Intact  Recent Changes in Weight: No Recent Weight Change  Current nutritional status an area of need that is impacting patient's ability to self-manage diabetes?: No    Additional Social History    Support  Does anyone support you with your diabetes care?: yes  Who supports you?: family member, self  Who takes you to your medical appointments?: self  Does the current support meet the patient's needs?: Yes  Is Support an area impacting ability to self-manage diabetes?: No    Access to Mass Media & Technology  Does the patient have access to any of the following devices or technologies?: Smart  phone  Media or technology needs impacting ability to self-manage diabetes?: No    Cognitive/Behavioral Health  Alert and Oriented: Yes  Difficulty Thinking: No  Requires Prompting: No  Requires assistance for routine expression?: No  Cognitive or behavioral barriers impacting ability to self-manage diabetes?: No    Culture/Sikhism  Culture or Jainism beliefs that may impact ability to access healthcare: No    Communication  Language preference: English  Hearing Problems: No  Vision Problems: No  Communication needs impacting ability to self-manage diabetes?: No    Health Literacy  Preferred Learning Method: Face to Face  How often do you need to have someone help you read instructions, pamphlets, or written material from your doctor or pharmacy?: Never  Health literacy needs impacting ability to self-manage diabetes?: No      Diabetes Self-Management Skills Assessment    Diabetes Disease Process/Treatment Options  Patient/caregiver able to state what happens when someone has diabetes.: somewhat  Patient/caregiver knows what type of diabetes they have.: yes  Diabetes Type : Type II  Patient/caregiver able to identify at least three signs and symptoms of diabetes.: no  Patient able to identify at least three risk factors for diabetes.: no  Diabetes Disease Process/Treatment Options: Skills Assessment Completed: Yes  Assessment indicates:: Instruction Needed  Area of need?: Yes    Nutrition/Healthy Eating  Method of carbohydrate measurement:: no method  Patient can identify foods that impact blood sugar.: yes  Patient-identified foods:: sweets, starches (bread, pasta, rice, cereal), soda, fruit/fruit juice  Nutrition/Healthy Eating Skills Assessment Completed:: Yes  Assessment indicates:: Instruction Needed  Area of need?: Yes    Physical Activity/Exercise  Patient's daily activity level:: lightly active (No formal activity - takes care of toddler)  Patient formally exercises outside of work.: no  Stated forms of  physical activity:: any movement performed by muscles that uses energy  Patient can identify reasons why exercise/physical activity is important in diabetes management.: no  Physical Activity/Exercise Skills Assessment Completed: : Yes  Assessment indicates:: Instruction Needed  Area of need?: Yes    Medications  Patient is able to describe current diabetes management routine.: yes  Diabetes management routine:: oral medications, injectable medications  Patient is able to identify current diabetes medications, dosages, and appropriate timing of medications.: yes  Patient understands the purpose of the medications taken for diabetes.: no  Patient reports problems or concerns with current medication regimen.: no  Medication Skills Assessment Completed:: Yes  Assessment indicates:: Instruction Needed  Area of need?: Yes    Home Blood Glucose Monitoring  Patient states that blood sugar is checked at home daily.: yes  Monitoring Method:: home glucometer  How often do you check your blood sugar?: Twice a day  Home Blood Glucose Monitoring Skills Assessment Completed: : Yes  Assessment indicates:: Instruction Needed  Area of need?: Yes    Acute Complications  Patient is able to identify types of acute complications: No  Acute Complications Skills Assessment Completed: : Yes  Assessment indicates:: Instruction Needed  Area of need?: Yes    Chronic Complications  Patient can identify major chronic complications of diabetes.: no  Chronic Complications Skills Assessment Completed: : Yes  Assessment indicates:: Instruction Needed  Area of need?: Yes    Psychosocial/Coping  Patient can identify ways of coping with chronic disease.: yes  Patient-stated ways of coping with chronic disease:: support from loved ones  Psychosocial/Coping Skills Assessment Completed: : Yes  Assessment indicates:: Adequate understanding  Area of need?: No    Assessment Summary and Plan    Based on today's diabetes care assessment, the following areas  of need were identified:      Social 4/10/2023   Support No   Access to Mass Media/Tech No   Cognitive/Behavioral Health No   Culture/[employer] No   Communication No   Health Literacy No        Clinical 4/10/2023   Medication Adherence No   Lab Compliance -   Nutritional Status No        Diabetes Self-Management Skills 4/10/2023   Diabetes Disease Process/Treatment Options Yes - reviewed diabetes disease process and treatment options   Nutrition/Healthy Eating Yes - reviewed CHO counting, label reading and addt'l resources to assist w/ CHO counting; plate method reviewed; alternatives to sugary beverages discussed   Physical Activity/Exercise Yes - reviewed goals and benefits   Medication Yes - reviewed MOA of medication and regimen   Home Blood Glucose Monitoring Yes - reviewed SMBG schedule and BG goals; not interested in personal CGM at this time   Acute Complications Yes - reviewed s/s and treatment of hypo/hyperglycemia   Chronic Complications Yes - reviewed standards of care   Psychosocial/Coping No          Today's interventions were provided through individual discussion, instruction, and written materials were provided.      Patient verbalized understanding of instruction and written materials.  Pt was able to return back demonstration of instructions today. Patient understood key points, needs reinforcement and further instruction.     Diabetes Self-Management Care Plan:    Today's Diabetes Self-Management Care Plan was developed with Delmis's input. Delmis has agreed to work toward the following goal(s) to improve his/her overall diabetes control.      There are no recently modified care plans to display for this patient.      Follow Up Plan     Follow up in about 6 months (around 10/10/2023).    Today's care plan and follow up schedule was discussed with patient.  Delmis verbalized understanding of the care plan, goals, and agrees to follow up plan.        The patient was encouraged to communicate  with his/her health care provider/physician and care team regarding his/her condition(s) and treatment.  I provided the patient with my contact information today and encouraged to contact me via phone or [facility] Patient Portal as needed.     Length of Visit   Total Time: 60 Minutes

## 2023-04-12 ENCOUNTER — TELEPHONE (OUTPATIENT)
Dept: INTERNAL MEDICINE | Facility: CLINIC | Age: 38
End: 2023-04-12
Payer: COMMERCIAL

## 2023-05-06 ENCOUNTER — TELEPHONE (OUTPATIENT)
Dept: INTERNAL MEDICINE | Facility: CLINIC | Age: 38
End: 2023-05-06
Payer: COMMERCIAL

## 2023-05-06 DIAGNOSIS — E11.9 TYPE 2 DIABETES MELLITUS WITHOUT COMPLICATION, WITHOUT LONG-TERM CURRENT USE OF INSULIN: ICD-10-CM

## 2023-05-08 DIAGNOSIS — E11.9 TYPE 2 DIABETES MELLITUS WITHOUT COMPLICATION, WITHOUT LONG-TERM CURRENT USE OF INSULIN: ICD-10-CM

## 2023-05-09 RX ORDER — SEMAGLUTIDE 0.68 MG/ML
0.5 INJECTION, SOLUTION SUBCUTANEOUS
Qty: 3 ML | Refills: 0 | Status: SHIPPED | OUTPATIENT
Start: 2023-05-09 | End: 2023-05-10 | Stop reason: SDUPTHER

## 2023-05-09 NOTE — TELEPHONE ENCOUNTER
How does she feel with the combination of medications for diabetes (Steglatro, Ozempic)? Does she have any GI side effects? Has she been checking her BG readings regularly? How high have they been?    Will increase Ozempic to 0.5mg weekly for 1 month. Advise her to let me know of her response before she requests a refill. If she tolerates Ozempic well, will continue to increase the dose each month.

## 2023-05-10 ENCOUNTER — TELEPHONE (OUTPATIENT)
Dept: INTERNAL MEDICINE | Facility: CLINIC | Age: 38
End: 2023-05-10
Payer: COMMERCIAL

## 2023-05-10 RX ORDER — SEMAGLUTIDE 0.68 MG/ML
0.5 INJECTION, SOLUTION SUBCUTANEOUS
Qty: 3 ML | Refills: 0 | Status: SHIPPED | OUTPATIENT
Start: 2023-05-10 | End: 2023-06-27

## 2023-05-10 NOTE — TELEPHONE ENCOUNTER
"Pt states she feels fine, no side effects "numbers have been in the low 100s before meals and 160s/170s after meals."  "

## 2023-05-12 ENCOUNTER — PATIENT OUTREACH (OUTPATIENT)
Dept: ADMINISTRATIVE | Facility: HOSPITAL | Age: 38
End: 2023-05-12
Payer: COMMERCIAL

## 2023-05-29 NOTE — TELEPHONE ENCOUNTER
No care due was identified.  [facility] Embedded Care Due Messages. Reference number: [id number].   5/29/2023 2:26:02 PM CDT

## 2023-05-30 RX ORDER — ERTUGLIFLOZIN 5 MG/1
5 TABLET, FILM COATED ORAL DAILY
Qty: 30 TABLET | Refills: 2 | Status: SHIPPED | OUTPATIENT
Start: 2023-05-30 | End: 2023-07-06 | Stop reason: SDUPTHER

## 2023-06-15 ENCOUNTER — TELEPHONE (OUTPATIENT)
Dept: INTERNAL MEDICINE | Facility: CLINIC | Age: 38
End: 2023-06-15
Payer: COMMERCIAL

## 2023-06-20 ENCOUNTER — PATIENT OUTREACH (OUTPATIENT)
Dept: ADMINISTRATIVE | Facility: HOSPITAL | Age: 38
End: 2023-06-20
Payer: COMMERCIAL

## 2023-06-20 DIAGNOSIS — E11.9 TYPE 2 DIABETES MELLITUS WITHOUT COMPLICATION, WITHOUT LONG-TERM CURRENT USE OF INSULIN: Primary | ICD-10-CM

## 2023-06-20 NOTE — PROGRESS NOTES
Health Maintenance Due   Topic Date Due    COVID-19 Vaccine (4 - Pfizer series) 06/07/2022    Diabetes Urine Screening  08/15/2023     Chart reviewed.   Immunizations: Reconciled  Orders placed: N/A  Upcoming appts to satisfy JACOB topics: N/A

## 2023-06-24 ENCOUNTER — TELEPHONE (OUTPATIENT)
Dept: INTERNAL MEDICINE | Facility: CLINIC | Age: 38
End: 2023-06-24
Payer: COMMERCIAL

## 2023-06-24 DIAGNOSIS — E11.9 TYPE 2 DIABETES MELLITUS WITHOUT COMPLICATION, WITHOUT LONG-TERM CURRENT USE OF INSULIN: ICD-10-CM

## 2023-06-24 NOTE — TELEPHONE ENCOUNTER
Refill Routing Note   Medication(s) are not appropriate for processing by [facility] for the following reason(s):      New or recently adjusted medication    [facility] action(s):  Defer Care Due:  Labs due              Appointments  past 12m or future 3m with PCP    Date Provider   Last Visit   3/24/2023 Oliva Donnelly MD   Next Visit   6/29/2023 Oliva Donnelly MD   ED visits in past 90 days: 0        Note composed:3:03 PM 06/24/2023            UNABLE TO LEAVE MESSAGE VOICEMAIL FULL

## 2023-06-24 NOTE — TELEPHONE ENCOUNTER
Care Due:                  Date            Visit Type   Department     Provider  --------------------------------------------------------------------------------                                EP -                              PRIMARY      MET INTERNAL  Last Visit: 03-      CARE ([facility])   DIMA Donnelly                              EP -                              PRIMARY      MET INTERNAL  Next Visit: 06-      CARE ([facility])   MEDICINE       Oliva Donnelly                                                            Last  Test          Frequency    Reason                     Performed    Due Date  --------------------------------------------------------------------------------    HBA1C.......  6 months...  ertugliflozin,             03- 09-                             semaglutide..............    [employer] Embedded Care Due Messages. Reference number: [id number].   6/24/2023 9:08:13 AM CDT

## 2023-06-26 ENCOUNTER — LAB VISIT (OUTPATIENT)
Dept: LAB | Facility: HOSPITAL | Age: 38
End: 2023-06-26
Attending: INTERNAL MEDICINE
Payer: COMMERCIAL

## 2023-06-26 DIAGNOSIS — E11.65 TYPE 2 DIABETES MELLITUS WITH HYPERGLYCEMIA, WITHOUT LONG-TERM CURRENT USE OF INSULIN: ICD-10-CM

## 2023-06-26 LAB
ESTIMATED AVG GLUCOSE: 160 MG/DL (ref 68–131)
HBA1C MFR BLD: 7.2 % (ref 4–5.6)

## 2023-06-26 PROCEDURE — 36415 COLL VENOUS BLD VENIPUNCTURE: CPT | Mod: PO | Performed by: INTERNAL MEDICINE

## 2023-06-26 PROCEDURE — 83036 HEMOGLOBIN GLYCOSYLATED A1C: CPT | Performed by: INTERNAL MEDICINE

## 2023-06-27 ENCOUNTER — PATIENT MESSAGE (OUTPATIENT)
Dept: INTERNAL MEDICINE | Facility: CLINIC | Age: 38
End: 2023-06-27
Payer: COMMERCIAL

## 2023-06-27 RX ORDER — SEMAGLUTIDE 1.34 MG/ML
1 INJECTION, SOLUTION SUBCUTANEOUS
Qty: 3 ML | Refills: 5 | Status: SHIPPED | OUTPATIENT
Start: 2023-06-27 | End: 2023-10-17

## 2023-06-27 NOTE — TELEPHONE ENCOUNTER
Please inform her that HbA1c has improved and decreased from 10.4 to 7.2 and is close to goal.    Recommend increasing Ozempic from 0.5mg weekly to 1mg/week. Has she noticed any side effects with Ozempic?

## 2023-07-06 RX ORDER — ERTUGLIFLOZIN 5 MG/1
5 TABLET, FILM COATED ORAL DAILY
Qty: 30 TABLET | Refills: 2 | Status: SHIPPED | OUTPATIENT
Start: 2023-07-06 | End: 2024-02-19 | Stop reason: SDUPTHER

## 2023-07-06 NOTE — TELEPHONE ENCOUNTER
No care due was identified.  [facility] Embedded Care Due Messages. Reference number: [id number].   7/06/2023 11:45:27 AM CDT

## 2023-07-17 ENCOUNTER — OFFICE VISIT (OUTPATIENT)
Dept: INTERNAL MEDICINE | Facility: CLINIC | Age: 38
End: 2023-07-17
Payer: COMMERCIAL

## 2023-07-17 VITALS
HEART RATE: 85 BPM | TEMPERATURE: 98 F | SYSTOLIC BLOOD PRESSURE: 118 MMHG | DIASTOLIC BLOOD PRESSURE: 72 MMHG | WEIGHT: 238.13 LBS | RESPIRATION RATE: 16 BRPM | OXYGEN SATURATION: 100 % | HEIGHT: 60 IN | BODY MASS INDEX: 46.75 KG/M2

## 2023-07-17 DIAGNOSIS — E66.01 MORBID OBESITY WITH BMI OF 45.0-49.9, ADULT: ICD-10-CM

## 2023-07-17 DIAGNOSIS — E11.9 TYPE 2 DIABETES MELLITUS WITHOUT COMPLICATION, WITHOUT LONG-TERM CURRENT USE OF INSULIN: Primary | ICD-10-CM

## 2023-07-17 PROCEDURE — 3008F PR BODY MASS INDEX (BMI) DOCUMENTED: ICD-10-PCS | Mod: CPTII,S$GLB,, | Performed by: INTERNAL MEDICINE

## 2023-07-17 PROCEDURE — 3074F PR MOST RECENT SYSTOLIC BLOOD PRESSURE < 130 MM HG: ICD-10-PCS | Mod: CPTII,S$GLB,, | Performed by: INTERNAL MEDICINE

## 2023-07-17 PROCEDURE — 1159F PR MEDICATION LIST DOCUMENTED IN MEDICAL RECORD: ICD-10-PCS | Mod: CPTII,S$GLB,, | Performed by: INTERNAL MEDICINE

## 2023-07-17 PROCEDURE — 3051F HG A1C>EQUAL 7.0%<8.0%: CPT | Mod: CPTII,S$GLB,, | Performed by: INTERNAL MEDICINE

## 2023-07-17 PROCEDURE — 3051F PR MOST RECENT HEMOGLOBIN A1C LEVEL 7.0 - < 8.0%: ICD-10-PCS | Mod: CPTII,S$GLB,, | Performed by: INTERNAL MEDICINE

## 2023-07-17 PROCEDURE — 3078F PR MOST RECENT DIASTOLIC BLOOD PRESSURE < 80 MM HG: ICD-10-PCS | Mod: CPTII,S$GLB,, | Performed by: INTERNAL MEDICINE

## 2023-07-17 PROCEDURE — 3078F DIAST BP <80 MM HG: CPT | Mod: CPTII,S$GLB,, | Performed by: INTERNAL MEDICINE

## 2023-07-17 PROCEDURE — 99999 PR PBB SHADOW E&M-EST. PATIENT-LVL III: CPT | Mod: PBBFAC,,, | Performed by: INTERNAL MEDICINE

## 2023-07-17 PROCEDURE — 1159F MED LIST DOCD IN RCRD: CPT | Mod: CPTII,S$GLB,, | Performed by: INTERNAL MEDICINE

## 2023-07-17 PROCEDURE — 99213 PR OFFICE/OUTPT VISIT, EST, LEVL III, 20-29 MIN: ICD-10-PCS | Mod: S$GLB,,, | Performed by: INTERNAL MEDICINE

## 2023-07-17 PROCEDURE — 99213 OFFICE O/P EST LOW 20 MIN: CPT | Mod: S$GLB,,, | Performed by: INTERNAL MEDICINE

## 2023-07-17 PROCEDURE — 1160F PR REVIEW ALL MEDS BY PRESCRIBER/CLIN PHARMACIST DOCUMENTED: ICD-10-PCS | Mod: CPTII,S$GLB,, | Performed by: INTERNAL MEDICINE

## 2023-07-17 PROCEDURE — 1160F RVW MEDS BY RX/DR IN RCRD: CPT | Mod: CPTII,S$GLB,, | Performed by: INTERNAL MEDICINE

## 2023-07-17 PROCEDURE — 99999 PR PBB SHADOW E&M-EST. PATIENT-LVL III: ICD-10-PCS | Mod: PBBFAC,,, | Performed by: INTERNAL MEDICINE

## 2023-07-17 PROCEDURE — 3074F SYST BP LT 130 MM HG: CPT | Mod: CPTII,S$GLB,, | Performed by: INTERNAL MEDICINE

## 2023-07-17 PROCEDURE — 3008F BODY MASS INDEX DOCD: CPT | Mod: CPTII,S$GLB,, | Performed by: INTERNAL MEDICINE

## 2023-07-17 NOTE — PROGRESS NOTES
Subjective:     Delmis Thrasher is a 37 y.o. female who presents for   Chief Complaint   Patient presents with    Follow-up    Diabetes       HPI    Diabetes: Patient presents for follow up of diabetes. Current symptoms include: none. Symptoms have been basically asymptomatic. Patient denies foot ulcerations and polyuria. Evaluation to date has included: hemoglobin A1C.  Home sugars: patient does not check sugars. Current treatment:  Ozempic, Steglatro . Last dilated eye exam: done.    Lab Results   Component Value Date    HGBA1C 7.2 (H) 06/26/2023     (H) 03/24/2023       Body mass index is 46.5 kg/m².  Wt Readings from Last 3 Encounters:   07/17/23 108 kg (238 lb 1.6 oz)   03/24/23 120 kg (264 lb 8.8 oz)   10/25/22 114 kg (251 lb 5.2 oz)   - lost 26 lbs  - planning to have another baby, will need to stop ozempic prior        Review of Systems   Constitutional:  Negative for chills, diaphoresis and fever.   HENT:  Negative for congestion, ear pain, postnasal drip, sinus pressure and sore throat.    Eyes:  Negative for discharge and visual disturbance.   Respiratory:  Negative for cough and shortness of breath.    Cardiovascular:  Negative for chest pain and palpitations.   Gastrointestinal:  Negative for abdominal pain, constipation, diarrhea, nausea and vomiting.   Endocrine: Negative for polydipsia and polyuria.   Musculoskeletal:  Negative for arthralgias and myalgias.   Skin:  Negative for rash and wound.   Neurological:  Negative for dizziness, tremors and headaches.   Psychiatric/Behavioral:  Negative for dysphoric mood. The patient is not nervous/anxious.         Objective:     Physical Exam  Vitals reviewed.   Constitutional:       General: She is awake. She is not in acute distress.     Appearance: Normal appearance. She is well-developed and well-groomed.   HENT:      Head: Normocephalic and atraumatic.      Right Ear: Hearing and external ear normal.      Left Ear: Hearing and external  ear normal.      Nose: Nose normal. No congestion.      Mouth/Throat:      Mouth: Mucous membranes are moist.   Eyes:      General: Lids are normal. Vision grossly intact.   Cardiovascular:      Rate and Rhythm: Normal rate and regular rhythm.      Heart sounds: Normal heart sounds. No murmur heard.  Pulmonary:      Effort: Pulmonary effort is normal.      Breath sounds: Normal breath sounds. No decreased breath sounds or wheezing.   Abdominal:      General: Bowel sounds are normal. There is no distension.   Musculoskeletal:         General: Normal range of motion.      Cervical back: Normal range of motion.      Right lower leg: No edema.      Left lower leg: No edema.   Skin:     General: Skin is warm and dry.      Findings: No lesion or rash.   Neurological:      Mental Status: She is alert and oriented to person, place, and time.   Psychiatric:         Attention and Perception: Attention normal.         Mood and Affect: Mood normal.         Behavior: Behavior is cooperative.          Assessment:      1. Type 2 diabetes mellitus without complication, without long-term current use of insulin    2. Morbid obesity with BMI of 45.0-49.9, adult           Plan:     1. Type 2 diabetes mellitus without complication, without long-term current use of insulin  - continue Ozempic 1mg weekly, continue steglatro for now  - CBC Auto Differential; Future prior to next appointment  - Basic Metabolic Panel; Future  - Hemoglobin A1C; Future    2. Morbid obesity with BMI of 45.0-49.9, adult  - start exercising/weight training a few days each week to start, continue semaglutide    RTC in 3 months for follow-up or sooner if needed    __________________________    Oliva Donnelly MD, PharmD  [facility]  American Board of Obesity Medicine diplomate  Office [phone]

## 2023-08-31 DIAGNOSIS — E11.9 TYPE 2 DIABETES MELLITUS WITHOUT COMPLICATION: ICD-10-CM

## 2023-10-10 ENCOUNTER — LAB VISIT (OUTPATIENT)
Dept: LAB | Facility: HOSPITAL | Age: 38
End: 2023-10-10
Attending: INTERNAL MEDICINE
Payer: COMMERCIAL

## 2023-10-10 DIAGNOSIS — E11.9 TYPE 2 DIABETES MELLITUS WITHOUT COMPLICATION, WITHOUT LONG-TERM CURRENT USE OF INSULIN: ICD-10-CM

## 2023-10-10 LAB
ANION GAP SERPL CALC-SCNC: 7 MMOL/L (ref 8–16)
BASOPHILS # BLD AUTO: 0.05 K/UL (ref 0–0.2)
BASOPHILS NFR BLD: 0.7 % (ref 0–1.9)
BUN SERPL-MCNC: 13 MG/DL (ref 6–20)
CALCIUM SERPL-MCNC: 9 MG/DL (ref 8.7–10.5)
CHLORIDE SERPL-SCNC: 109 MMOL/L (ref 95–110)
CO2 SERPL-SCNC: 23 MMOL/L (ref 23–29)
CREAT SERPL-MCNC: 0.8 MG/DL (ref 0.5–1.4)
DIFFERENTIAL METHOD: NORMAL
EOSINOPHIL # BLD AUTO: 0.2 K/UL (ref 0–0.5)
EOSINOPHIL NFR BLD: 3.1 % (ref 0–8)
ERYTHROCYTE [DISTWIDTH] IN BLOOD BY AUTOMATED COUNT: 13.2 % (ref 11.5–14.5)
EST. GFR  (NO RACE VARIABLE): >60 ML/MIN/1.73 M^2
ESTIMATED AVG GLUCOSE: 154 MG/DL (ref 68–131)
GLUCOSE SERPL-MCNC: 137 MG/DL (ref 70–110)
HBA1C MFR BLD: 7 % (ref 4–5.6)
HCT VFR BLD AUTO: 44.4 % (ref 37–48.5)
HGB BLD-MCNC: 14.3 G/DL (ref 12–16)
IMM GRANULOCYTES # BLD AUTO: 0.04 K/UL (ref 0–0.04)
IMM GRANULOCYTES NFR BLD AUTO: 0.5 % (ref 0–0.5)
LYMPHOCYTES # BLD AUTO: 2.8 K/UL (ref 1–4.8)
LYMPHOCYTES NFR BLD: 37.4 % (ref 18–48)
MCH RBC QN AUTO: 29.2 PG (ref 27–31)
MCHC RBC AUTO-ENTMCNC: 32.2 G/DL (ref 32–36)
MCV RBC AUTO: 91 FL (ref 82–98)
MONOCYTES # BLD AUTO: 0.5 K/UL (ref 0.3–1)
MONOCYTES NFR BLD: 6.2 % (ref 4–15)
NEUTROPHILS # BLD AUTO: 3.9 K/UL (ref 1.8–7.7)
NEUTROPHILS NFR BLD: 52.1 % (ref 38–73)
NRBC BLD-RTO: 0 /100 WBC
PLATELET # BLD AUTO: 296 K/UL (ref 150–450)
PMV BLD AUTO: 10.5 FL (ref 9.2–12.9)
POTASSIUM SERPL-SCNC: 3.7 MMOL/L (ref 3.5–5.1)
RBC # BLD AUTO: 4.9 M/UL (ref 4–5.4)
SODIUM SERPL-SCNC: 139 MMOL/L (ref 136–145)
WBC # BLD AUTO: 7.41 K/UL (ref 3.9–12.7)

## 2023-10-10 PROCEDURE — 85025 COMPLETE CBC W/AUTO DIFF WBC: CPT | Performed by: INTERNAL MEDICINE

## 2023-10-10 PROCEDURE — 36415 COLL VENOUS BLD VENIPUNCTURE: CPT | Mod: PO | Performed by: INTERNAL MEDICINE

## 2023-10-10 PROCEDURE — 80048 BASIC METABOLIC PNL TOTAL CA: CPT | Performed by: INTERNAL MEDICINE

## 2023-10-10 PROCEDURE — 83036 HEMOGLOBIN GLYCOSYLATED A1C: CPT | Performed by: INTERNAL MEDICINE

## 2023-10-17 ENCOUNTER — LAB VISIT (OUTPATIENT)
Dept: LAB | Facility: HOSPITAL | Age: 38
End: 2023-10-17
Attending: INTERNAL MEDICINE
Payer: COMMERCIAL

## 2023-10-17 ENCOUNTER — OFFICE VISIT (OUTPATIENT)
Dept: INTERNAL MEDICINE | Facility: CLINIC | Age: 38
End: 2023-10-17
Payer: COMMERCIAL

## 2023-10-17 VITALS
WEIGHT: 232.56 LBS | OXYGEN SATURATION: 97 % | HEART RATE: 72 BPM | BODY MASS INDEX: 45.66 KG/M2 | TEMPERATURE: 98 F | HEIGHT: 60 IN | RESPIRATION RATE: 18 BRPM | DIASTOLIC BLOOD PRESSURE: 78 MMHG | SYSTOLIC BLOOD PRESSURE: 126 MMHG

## 2023-10-17 DIAGNOSIS — Z86.39 HISTORY OF VITAMIN D DEFICIENCY: ICD-10-CM

## 2023-10-17 DIAGNOSIS — E66.01 MORBID OBESITY WITH BMI OF 45.0-49.9, ADULT: ICD-10-CM

## 2023-10-17 DIAGNOSIS — Z00.00 ANNUAL PHYSICAL EXAM: Primary | ICD-10-CM

## 2023-10-17 DIAGNOSIS — Z12.31 ENCOUNTER FOR SCREENING MAMMOGRAM FOR HIGH-RISK PATIENT: ICD-10-CM

## 2023-10-17 DIAGNOSIS — Z00.00 ANNUAL PHYSICAL EXAM: ICD-10-CM

## 2023-10-17 DIAGNOSIS — E11.9 TYPE 2 DIABETES MELLITUS WITHOUT COMPLICATION, WITHOUT LONG-TERM CURRENT USE OF INSULIN: ICD-10-CM

## 2023-10-17 LAB
25(OH)D3+25(OH)D2 SERPL-MCNC: 30 NG/ML (ref 30–96)
ALBUMIN SERPL BCP-MCNC: 4 G/DL (ref 3.5–5.2)
ALP SERPL-CCNC: 59 U/L (ref 55–135)
ALT SERPL W/O P-5'-P-CCNC: 16 U/L (ref 10–44)
ANION GAP SERPL CALC-SCNC: 9 MMOL/L (ref 8–16)
AST SERPL-CCNC: 15 U/L (ref 10–40)
BILIRUB SERPL-MCNC: 0.5 MG/DL (ref 0.1–1)
BUN SERPL-MCNC: 13 MG/DL (ref 6–20)
CALCIUM SERPL-MCNC: 9.4 MG/DL (ref 8.7–10.5)
CHLORIDE SERPL-SCNC: 106 MMOL/L (ref 95–110)
CHOLEST SERPL-MCNC: 96 MG/DL (ref 120–199)
CHOLEST/HDLC SERPL: 2.6 {RATIO} (ref 2–5)
CO2 SERPL-SCNC: 24 MMOL/L (ref 23–29)
CREAT SERPL-MCNC: 0.8 MG/DL (ref 0.5–1.4)
EST. GFR  (NO RACE VARIABLE): >60 ML/MIN/1.73 M^2
GLUCOSE SERPL-MCNC: 137 MG/DL (ref 70–110)
HDLC SERPL-MCNC: 37 MG/DL (ref 40–75)
HDLC SERPL: 38.5 % (ref 20–50)
LDLC SERPL CALC-MCNC: 49.2 MG/DL (ref 63–159)
NONHDLC SERPL-MCNC: 59 MG/DL
POTASSIUM SERPL-SCNC: 4.3 MMOL/L (ref 3.5–5.1)
PROT SERPL-MCNC: 7.3 G/DL (ref 6–8.4)
SODIUM SERPL-SCNC: 139 MMOL/L (ref 136–145)
TRIGL SERPL-MCNC: 49 MG/DL (ref 30–150)
TSH SERPL DL<=0.005 MIU/L-ACNC: 0.65 UIU/ML (ref 0.4–4)

## 2023-10-17 PROCEDURE — 99999 PR PBB SHADOW E&M-EST. PATIENT-LVL V: CPT | Mod: PBBFAC,,, | Performed by: INTERNAL MEDICINE

## 2023-10-17 PROCEDURE — 1159F PR MEDICATION LIST DOCUMENTED IN MEDICAL RECORD: ICD-10-PCS | Mod: CPTII,S$GLB,, | Performed by: INTERNAL MEDICINE

## 2023-10-17 PROCEDURE — 1160F RVW MEDS BY RX/DR IN RCRD: CPT | Mod: CPTII,S$GLB,, | Performed by: INTERNAL MEDICINE

## 2023-10-17 PROCEDURE — 3008F BODY MASS INDEX DOCD: CPT | Mod: CPTII,S$GLB,, | Performed by: INTERNAL MEDICINE

## 2023-10-17 PROCEDURE — 3061F PR NEG MICROALBUMINURIA RESULT DOCUMENTED/REVIEW: ICD-10-PCS | Mod: CPTII,S$GLB,, | Performed by: INTERNAL MEDICINE

## 2023-10-17 PROCEDURE — 3066F PR DOCUMENTATION OF TREATMENT FOR NEPHROPATHY: ICD-10-PCS | Mod: CPTII,S$GLB,, | Performed by: INTERNAL MEDICINE

## 2023-10-17 PROCEDURE — 99396 PR PREVENTIVE VISIT,EST,40-64: ICD-10-PCS | Mod: S$GLB,,, | Performed by: INTERNAL MEDICINE

## 2023-10-17 PROCEDURE — 84443 ASSAY THYROID STIM HORMONE: CPT | Performed by: INTERNAL MEDICINE

## 2023-10-17 PROCEDURE — 3074F PR MOST RECENT SYSTOLIC BLOOD PRESSURE < 130 MM HG: ICD-10-PCS | Mod: CPTII,S$GLB,, | Performed by: INTERNAL MEDICINE

## 2023-10-17 PROCEDURE — 3066F NEPHROPATHY DOC TX: CPT | Mod: CPTII,S$GLB,, | Performed by: INTERNAL MEDICINE

## 2023-10-17 PROCEDURE — 99999 PR PBB SHADOW E&M-EST. PATIENT-LVL V: ICD-10-PCS | Mod: PBBFAC,,, | Performed by: INTERNAL MEDICINE

## 2023-10-17 PROCEDURE — 80053 COMPREHEN METABOLIC PANEL: CPT | Performed by: INTERNAL MEDICINE

## 2023-10-17 PROCEDURE — 3051F HG A1C>EQUAL 7.0%<8.0%: CPT | Mod: CPTII,S$GLB,, | Performed by: INTERNAL MEDICINE

## 2023-10-17 PROCEDURE — 3051F PR MOST RECENT HEMOGLOBIN A1C LEVEL 7.0 - < 8.0%: ICD-10-PCS | Mod: CPTII,S$GLB,, | Performed by: INTERNAL MEDICINE

## 2023-10-17 PROCEDURE — 3008F PR BODY MASS INDEX (BMI) DOCUMENTED: ICD-10-PCS | Mod: CPTII,S$GLB,, | Performed by: INTERNAL MEDICINE

## 2023-10-17 PROCEDURE — 80061 LIPID PANEL: CPT | Performed by: INTERNAL MEDICINE

## 2023-10-17 PROCEDURE — 3078F DIAST BP <80 MM HG: CPT | Mod: CPTII,S$GLB,, | Performed by: INTERNAL MEDICINE

## 2023-10-17 PROCEDURE — 3061F NEG MICROALBUMINURIA REV: CPT | Mod: CPTII,S$GLB,, | Performed by: INTERNAL MEDICINE

## 2023-10-17 PROCEDURE — 1160F PR REVIEW ALL MEDS BY PRESCRIBER/CLIN PHARMACIST DOCUMENTED: ICD-10-PCS | Mod: CPTII,S$GLB,, | Performed by: INTERNAL MEDICINE

## 2023-10-17 PROCEDURE — 3078F PR MOST RECENT DIASTOLIC BLOOD PRESSURE < 80 MM HG: ICD-10-PCS | Mod: CPTII,S$GLB,, | Performed by: INTERNAL MEDICINE

## 2023-10-17 PROCEDURE — 99396 PREV VISIT EST AGE 40-64: CPT | Mod: S$GLB,,, | Performed by: INTERNAL MEDICINE

## 2023-10-17 PROCEDURE — 3074F SYST BP LT 130 MM HG: CPT | Mod: CPTII,S$GLB,, | Performed by: INTERNAL MEDICINE

## 2023-10-17 PROCEDURE — 36415 COLL VENOUS BLD VENIPUNCTURE: CPT | Mod: PO | Performed by: INTERNAL MEDICINE

## 2023-10-17 PROCEDURE — 1159F MED LIST DOCD IN RCRD: CPT | Mod: CPTII,S$GLB,, | Performed by: INTERNAL MEDICINE

## 2023-10-17 PROCEDURE — 82306 VITAMIN D 25 HYDROXY: CPT | Performed by: INTERNAL MEDICINE

## 2023-10-17 RX ORDER — SEMAGLUTIDE 2.68 MG/ML
2 INJECTION, SOLUTION SUBCUTANEOUS
Qty: 3 ML | Refills: 11 | Status: SHIPPED | OUTPATIENT
Start: 2023-10-17 | End: 2024-03-15 | Stop reason: ALTCHOICE

## 2023-10-17 NOTE — PROGRESS NOTES
Subjective:     PCP: Oliva Donnelly MD    Delmis Thrasher is a 38 y.o. female who presents for an annual exam.    Medical History:   Past Medical History:   Diagnosis Date    Asthma     Herpes simplex virus (HSV) infection     History of gonorrhea     History of trichomoniasis 10/2018       Family History: family history includes Diabetes in her father; Hypertension in her mother; Thyroid disease in her mother.    Surgical History:   Past Surgical History:   Procedure Laterality Date     SECTION N/A 2021    Procedure:  SECTION;  Surgeon: Joan Laureano MD;  Location: [facility] L&D;  Service: OB/GYN;  Laterality: N/A;     SECTION  7/3/2021    DILATION AND CURETTAGE OF UTERUS USING SUCTION N/A 2020    Procedure: DILATION AND CURETTAGE, UTERUS, USING SUCTION;  Surgeon: Sharri Orozco MD;  Location: [facility] OR;  Service: OB/GYN;  Laterality: N/A;    MYRINGOTOMY W/ TUBES          Social History:  reports that she has never smoked. She has never used smokeless tobacco. She reports current alcohol use of about 1.0 standard drink of alcohol per week. She reports that she does not use drugs.     Allergies:   Review of patient's allergies indicates:   Allergen Reactions    Metformin Other (See Comments)     Abdominal pain         Medications:   Current Outpatient Medications   Medication Sig    blood sugar diagnostic Strp 1 strip by Misc.(Non-Drug; Combo Route) route 2 (two) times daily. Please provide items covered by patient's insurance    clindamycin (CLEOCIN T) 1 % external solution Apply topically. Apply topically nightly as needed    ertugliflozin (STEGLATRO) 5 mg Tab Take 5 mg by mouth once daily.    fluticasone propionate (FLONASE) 50 mcg/actuation nasal spray 1 spray (50 mcg total) by Each Nostril route once daily.    lancets Misc 1 lancet by Misc.(Non-Drug; Combo Route) route 2 (two) times daily.    spironolactone (ALDACTONE) 25 MG tablet Take 75 mg by  mouth once daily. Take 3 tabs every evening    valACYclovir (VALTREX) 500 MG tablet Take 1 tablet by mouth twice daily    blood-glucose meter kit 1 each by Other route once. Use as instructed. Provide meter covered by patient's insurance for 1 dose    norethindrone (LYLEQ) 0.35 mg tablet Take 1 tablet (0.35 mg total) by mouth once daily.    nystatin-triamcinolone (MYCOLOG II) cream Apply to affected area 2 times daily (Patient not taking: Reported on 3/24/2023)    semaglutide (OZEMPIC) 2 mg/dose (8 mg/3 mL) PnIj Inject 2 mg into the skin every 7 days.     No current facility-administered medications for this visit.       Health Maintenance:   Health Maintenance Topics with due status: Not Due       Topic Last Completion Date    TETANUS VACCINE 11/25/2021    Cervical Cancer Screening 10/04/2022    Diabetes Urine Screening 10/10/2023    Hemoglobin A1c 10/10/2023     Lab Results   Component Value Date    HEPCAB Negative 08/27/2021    JMA32WRGA Negative 06/23/2021     Eye Exam:  due, NO Eye Specialists  Dental Exam: due  OB/GYN: has appt with Dr. Orozco  Pap smear: 10/11/2022  Mammogram: 10/25/22, due  HIV: 6/2021, neg  Hepatitic C  Ab: 8/2021, neg    Vaccinations:  Immunization History   Administered Date(s) Administered    COVID-19, MRNA, LN-S, PF (Pfizer) (Gray Cap) 04/12/2022    COVID-19, MRNA, LN-S, PF (Pfizer) (Purple Cap) 07/19/2021, 08/09/2021    Influenza 11/01/2020, 11/04/2021    Influenza - Quadrivalent - PF *Preferred* (6 months and older) 09/21/2023    Pneumococcal Conjugate - 20 Valent 03/24/2023    Tdap 07/10/2020, 06/10/2021, 11/25/2021     Influenza: 9/2023  Tetanus: 2021  Prevnar-13: 2023, had reaction  Covid vaccine: needs booster    Body mass index is 45.42 kg/m².  Wt Readings from Last 3 Encounters:   10/17/23 105.5 kg (232 lb 9.4 oz)   07/17/23 108 kg (238 lb 1.6 oz)   03/24/23 120 kg (264 lb 8.8 oz)       Review of Systems   Constitutional:  Negative for chills, diaphoresis, fatigue and  fever.   HENT:  Negative for congestion, dental problem, ear discharge, ear pain, postnasal drip, rhinorrhea, sinus pressure, sore throat and trouble swallowing.    Eyes:  Negative for redness and visual disturbance.   Respiratory:  Negative for cough, chest tightness and shortness of breath.    Cardiovascular:  Negative for chest pain, palpitations and leg swelling.   Gastrointestinal:  Negative for abdominal pain, blood in stool, constipation, diarrhea, nausea and vomiting.   Endocrine: Negative for polydipsia and polyuria.   Genitourinary:  Negative for decreased urine volume, dysuria, frequency and hematuria.   Musculoskeletal:  Negative for arthralgias, back pain and myalgias.   Skin:  Negative for rash and wound.   Neurological:  Negative for dizziness, weakness, numbness and headaches.   Hematological:  Negative for adenopathy.   Psychiatric/Behavioral:  Negative for dysphoric mood and sleep disturbance. The patient is not nervous/anxious.           Objective:     Physical Exam  Vitals reviewed.   Constitutional:       General: She is awake. She is not in acute distress.     Appearance: Normal appearance. She is well-developed and well-groomed. She is not diaphoretic.   HENT:      Head: Normocephalic and atraumatic.      Right Ear: Hearing, tympanic membrane, ear canal and external ear normal. Tympanic membrane is not erythematous or bulging.      Left Ear: Hearing, tympanic membrane, ear canal and external ear normal. Tympanic membrane is not erythematous or bulging.      Nose: Nose normal. No congestion.      Mouth/Throat:      Mouth: Mucous membranes are moist.      Tongue: No lesions.      Pharynx: Oropharynx is clear. Uvula midline. No oropharyngeal exudate or posterior oropharyngeal erythema.   Eyes:      General: Lids are normal. Vision grossly intact. Gaze aligned appropriately. No scleral icterus.     Conjunctiva/sclera:      Right eye: Right conjunctiva is not injected.      Left eye: Left  conjunctiva is not injected.      Pupils: Pupils are equal, round, and reactive to light.   Neck:      Thyroid: No thyroid mass or thyromegaly.   Cardiovascular:      Rate and Rhythm: Normal rate and regular rhythm.      Pulses: Normal pulses.      Heart sounds: Normal heart sounds. No murmur heard.  Pulmonary:      Effort: Pulmonary effort is normal. No respiratory distress.      Breath sounds: Normal breath sounds. No decreased breath sounds or wheezing.   Abdominal:      General: Bowel sounds are normal. There is no distension.      Palpations: Abdomen is soft. Abdomen is not rigid.      Tenderness: There is no abdominal tenderness. There is no guarding or rebound.   Musculoskeletal:         General: Normal range of motion.      Cervical back: Normal range of motion and neck supple.      Right lower leg: No edema.      Left lower leg: No edema.   Lymphadenopathy:      Cervical: No cervical adenopathy.      Upper Body:      Right upper body: No supraclavicular adenopathy.      Left upper body: No supraclavicular adenopathy.   Skin:     General: Skin is warm and dry.      Coloration: Skin is not cyanotic.      Findings: No lesion or rash.      Nails: There is no clubbing.   Neurological:      General: No focal deficit present.      Mental Status: She is alert and oriented to person, place, and time.      Sensory: Sensation is intact.      Coordination: Coordination is intact.      Gait: Gait is intact.      Deep Tendon Reflexes: Reflexes are normal and symmetric.   Psychiatric:         Attention and Perception: Attention normal.         Mood and Affect: Mood normal.         Behavior: Behavior is cooperative.            Assessment:        1. Annual physical exam    2. Type 2 diabetes mellitus without complication, without long-term current use of insulin    3. History of vitamin D deficiency    4. Encounter for screening mammogram for high-risk patient    5. Morbid obesity with BMI of 45.0-49.9, adult            Plan:     1. Annual physical exam  - Comprehensive Metabolic Panel; Future  - Lipid Panel; Future  - TSH; Future  - Urinalysis; Future  - Vitamin D; Future    2. Type 2 diabetes mellitus without complication, without long-term current use of insulin  - improving, HbA1c 7.0  - continue stagletro, increase Ozempic to 2mg weekly  - semaglutide (OZEMPIC) 2 mg/dose (8 mg/3 mL) PnIj; Inject 2 mg into the skin every 7 days.  Dispense: 3 mL; Refill: 11    3. History of vitamin D deficiency  - Vitamin D; Future    4. Encounter for screening mammogram for high-risk patient  - Mammo Digital Screening Bilat w/ Dayne; Future    5. Morbid obesity with BMP of 45.0-49.9, adult  - continue working on reducing portion size, increasing intake of healthy foods  - continue semaglutide 2mg weekly      RTC in 4 months for follow-up or sooner if needed    __________________________    Oliva Donnelly MD, PharmD  [facility]  American Board of Obesity Medicine diplomate  Office [phone]

## 2023-10-25 ENCOUNTER — OFFICE VISIT (OUTPATIENT)
Dept: OBSTETRICS AND GYNECOLOGY | Facility: CLINIC | Age: 38
End: 2023-10-25
Payer: COMMERCIAL

## 2023-10-25 ENCOUNTER — PATIENT MESSAGE (OUTPATIENT)
Dept: RADIOLOGY | Facility: HOSPITAL | Age: 38
End: 2023-10-25
Payer: COMMERCIAL

## 2023-10-25 VITALS
SYSTOLIC BLOOD PRESSURE: 114 MMHG | WEIGHT: 238.44 LBS | BODY MASS INDEX: 46.81 KG/M2 | HEIGHT: 60 IN | DIASTOLIC BLOOD PRESSURE: 70 MMHG

## 2023-10-25 DIAGNOSIS — Z01.419 ENCOUNTER FOR ANNUAL ROUTINE GYNECOLOGICAL EXAMINATION: Primary | ICD-10-CM

## 2023-10-25 DIAGNOSIS — Z12.31 ENCOUNTER FOR SCREENING MAMMOGRAM FOR BREAST CANCER: ICD-10-CM

## 2023-10-25 PROCEDURE — 3051F PR MOST RECENT HEMOGLOBIN A1C LEVEL 7.0 - < 8.0%: ICD-10-PCS | Mod: CPTII,S$GLB,, | Performed by: OBSTETRICS & GYNECOLOGY

## 2023-10-25 PROCEDURE — 3066F PR DOCUMENTATION OF TREATMENT FOR NEPHROPATHY: ICD-10-PCS | Mod: CPTII,S$GLB,, | Performed by: OBSTETRICS & GYNECOLOGY

## 2023-10-25 PROCEDURE — 1160F RVW MEDS BY RX/DR IN RCRD: CPT | Mod: CPTII,S$GLB,, | Performed by: OBSTETRICS & GYNECOLOGY

## 2023-10-25 PROCEDURE — 3008F PR BODY MASS INDEX (BMI) DOCUMENTED: ICD-10-PCS | Mod: CPTII,S$GLB,, | Performed by: OBSTETRICS & GYNECOLOGY

## 2023-10-25 PROCEDURE — 99396 PREV VISIT EST AGE 40-64: CPT | Mod: S$GLB,,, | Performed by: OBSTETRICS & GYNECOLOGY

## 2023-10-25 PROCEDURE — 3074F PR MOST RECENT SYSTOLIC BLOOD PRESSURE < 130 MM HG: ICD-10-PCS | Mod: CPTII,S$GLB,, | Performed by: OBSTETRICS & GYNECOLOGY

## 2023-10-25 PROCEDURE — 1159F MED LIST DOCD IN RCRD: CPT | Mod: CPTII,S$GLB,, | Performed by: OBSTETRICS & GYNECOLOGY

## 2023-10-25 PROCEDURE — 3008F BODY MASS INDEX DOCD: CPT | Mod: CPTII,S$GLB,, | Performed by: OBSTETRICS & GYNECOLOGY

## 2023-10-25 PROCEDURE — 3061F PR NEG MICROALBUMINURIA RESULT DOCUMENTED/REVIEW: ICD-10-PCS | Mod: CPTII,S$GLB,, | Performed by: OBSTETRICS & GYNECOLOGY

## 2023-10-25 PROCEDURE — 1160F PR REVIEW ALL MEDS BY PRESCRIBER/CLIN PHARMACIST DOCUMENTED: ICD-10-PCS | Mod: CPTII,S$GLB,, | Performed by: OBSTETRICS & GYNECOLOGY

## 2023-10-25 PROCEDURE — 3078F DIAST BP <80 MM HG: CPT | Mod: CPTII,S$GLB,, | Performed by: OBSTETRICS & GYNECOLOGY

## 2023-10-25 PROCEDURE — 99396 PR PREVENTIVE VISIT,EST,40-64: ICD-10-PCS | Mod: S$GLB,,, | Performed by: OBSTETRICS & GYNECOLOGY

## 2023-10-25 PROCEDURE — 1159F PR MEDICATION LIST DOCUMENTED IN MEDICAL RECORD: ICD-10-PCS | Mod: CPTII,S$GLB,, | Performed by: OBSTETRICS & GYNECOLOGY

## 2023-10-25 PROCEDURE — 3074F SYST BP LT 130 MM HG: CPT | Mod: CPTII,S$GLB,, | Performed by: OBSTETRICS & GYNECOLOGY

## 2023-10-25 PROCEDURE — 3066F NEPHROPATHY DOC TX: CPT | Mod: CPTII,S$GLB,, | Performed by: OBSTETRICS & GYNECOLOGY

## 2023-10-25 PROCEDURE — 3078F PR MOST RECENT DIASTOLIC BLOOD PRESSURE < 80 MM HG: ICD-10-PCS | Mod: CPTII,S$GLB,, | Performed by: OBSTETRICS & GYNECOLOGY

## 2023-10-25 PROCEDURE — 99999 PR PBB SHADOW E&M-EST. PATIENT-LVL III: ICD-10-PCS | Mod: PBBFAC,,, | Performed by: OBSTETRICS & GYNECOLOGY

## 2023-10-25 PROCEDURE — 3061F NEG MICROALBUMINURIA REV: CPT | Mod: CPTII,S$GLB,, | Performed by: OBSTETRICS & GYNECOLOGY

## 2023-10-25 PROCEDURE — 3051F HG A1C>EQUAL 7.0%<8.0%: CPT | Mod: CPTII,S$GLB,, | Performed by: OBSTETRICS & GYNECOLOGY

## 2023-10-25 PROCEDURE — 99999 PR PBB SHADOW E&M-EST. PATIENT-LVL III: CPT | Mod: PBBFAC,,, | Performed by: OBSTETRICS & GYNECOLOGY

## 2023-10-25 NOTE — PROGRESS NOTES
Chief Complaint: Well Woman Exam     HPI:      Delmis Thrasher is a 38 y.o.  who presents today for well woman exam.  LMP: Patient's last menstrual period was 10/12/2023.  No issues, problems, or complaints. Specifically, patient denies abnormal vaginal bleeding, discharge, pelvic pain, urinary problems, or changes in appetite. Ms. Thrasher is currently sexually active with a single male partner. She is currently using no method for contraception and would like to conceive. Is currently on Ozempic. Knows to stop if she gets a positive pregnancy test. She would like STD screening today.    Previous Pap: NILM, HPV negative (10/11/2022)  Previous Mammogram: BiRads: 1 T-C Score: 12.5% (10/25/22)     Ms. Thrasher confirms that she wears her seatbelt when riding in the car and does not text while driving.     OB History          2    Para   1    Term   1       0    AB   1    Living   1         SAB   1    IAB   0    Ectopic   0    Multiple   0    Live Births   1           Obstetric Comments   Menarche 89             ROS:     GENERAL: Denies unintentional weight gain or weight loss. Feeling well overall.   SKIN: Denies rash or lesions.   HEENT: Denies headaches, or vision changes.   CARDIOVASCULAR: Denies palpitations or chest pain.   RESPIRATORY: Denies shortness of breath or dyspnea on exertion.  BREASTS: Denies lumps or nipple discharge.   ABDOMEN: Denies constipation, diarrhea, nausea, vomiting, change in appetite.  URINARY: Denies frequency, dysuria.  NEUROLOGIC: Denies syncope or weakness.   PSYCHIATRIC: Denies uncontrolled depression or anxiety.    Physical Exam:      PHYSICAL EXAM:  /70   Ht 5' (1.524 m)   Wt 108.2 kg (238 lb 6.8 oz)   LMP 10/12/2023   BMI 46.56 kg/m²   Body mass index is 46.56 kg/m².     APPEARANCE: Well nourished, well developed, in no acute distress.  PSYCH: Appropriate mood and affect.  SKIN: No acne or hirsutism  NECK: Neck symmetric without masses  NODES:  No inguinal, axillary, or supraclavicular lymph node enlargement  ABDOMEN: Soft.  No tenderness or masses.    CARDIOVASCULAR: No edema of peripheral extremities  BREASTS: Symmetrical, no visible skin lesions. No palpable masses. No nipple discharge bilaterally.  PELVIC: Normal external genitalia without lesions.  Normal hair distribution.  Adequate perineal body, normal urethral meatus.  Vagina moist and well rugated. Without lesions. Vagina without abnormal discharge.  Cervix without lesions, abnormal discharge, or tenderness.. No significant cystocele or rectocele.  Bimanual exam limited by habitus.      Assessment/Plan:     Encounter for annual routine gynecological examination    Encounter for screening mammogram for breast cancer    Pt currently taking a PNV and monitoring menses. Will stop Ozempic if she conceives. Discussed balanced diet and exercise to aid in ovulation.    Follow up in about 1 year (around 10/25/2024) for Annual Exam.    Counseling:     Patient was counseled today on current ASCCP pap guidelines, the recommendation for yearly physical exams, safe driving habits, breast self awareness and annual mammograms. She is to see her PCP for other health maintenance.     Use of the [facility] Patient Portal discussed and encouraged during today's visit.

## 2023-11-01 ENCOUNTER — HOSPITAL ENCOUNTER (OUTPATIENT)
Dept: RADIOLOGY | Facility: HOSPITAL | Age: 38
Discharge: HOME | End: 2023-11-01
Attending: INTERNAL MEDICINE
Payer: COMMERCIAL

## 2023-11-01 VITALS — HEIGHT: 60 IN | BODY MASS INDEX: 46.72 KG/M2 | WEIGHT: 238 LBS

## 2023-11-01 DIAGNOSIS — Z12.31 ENCOUNTER FOR SCREENING MAMMOGRAM FOR HIGH-RISK PATIENT: ICD-10-CM

## 2023-11-01 PROCEDURE — 77067 MAMMO DIGITAL SCREENING BILAT WITH TOMO: ICD-10-PCS | Mod: 26,,, | Performed by: RADIOLOGY

## 2023-11-01 PROCEDURE — 77063 MAMMO DIGITAL SCREENING BILAT WITH TOMO: ICD-10-PCS | Mod: 26,,, | Performed by: RADIOLOGY

## 2023-11-01 PROCEDURE — 77063 BREAST TOMOSYNTHESIS BI: CPT | Mod: 26,,, | Performed by: RADIOLOGY

## 2023-11-01 PROCEDURE — 77067 SCR MAMMO BI INCL CAD: CPT | Mod: TC

## 2023-11-01 PROCEDURE — 77067 SCR MAMMO BI INCL CAD: CPT | Mod: 26,,, | Performed by: RADIOLOGY

## 2023-11-15 ENCOUNTER — TELEPHONE (OUTPATIENT)
Dept: INTERNAL MEDICINE | Facility: CLINIC | Age: 38
End: 2023-11-15
Payer: COMMERCIAL

## 2023-11-15 NOTE — TELEPHONE ENCOUNTER
----- Message from Oliva Donnelly MD sent at 11/15/2023 12:18 PM CST -----  Mammogram shows no signs of breast cancer.

## 2024-02-18 ENCOUNTER — PATIENT MESSAGE (OUTPATIENT)
Dept: OBSTETRICS AND GYNECOLOGY | Facility: CLINIC | Age: 39
End: 2024-02-18
Payer: COMMERCIAL

## 2024-02-19 ENCOUNTER — LAB VISIT (OUTPATIENT)
Dept: LAB | Facility: HOSPITAL | Age: 39
End: 2024-02-19
Attending: INTERNAL MEDICINE
Payer: COMMERCIAL

## 2024-02-19 ENCOUNTER — OFFICE VISIT (OUTPATIENT)
Dept: INTERNAL MEDICINE | Facility: CLINIC | Age: 39
End: 2024-02-19
Payer: COMMERCIAL

## 2024-02-19 VITALS
TEMPERATURE: 97 F | DIASTOLIC BLOOD PRESSURE: 84 MMHG | SYSTOLIC BLOOD PRESSURE: 120 MMHG | HEIGHT: 60 IN | WEIGHT: 238.13 LBS | BODY MASS INDEX: 46.75 KG/M2 | HEART RATE: 74 BPM | OXYGEN SATURATION: 98 % | RESPIRATION RATE: 18 BRPM

## 2024-02-19 DIAGNOSIS — E11.9 TYPE 2 DIABETES MELLITUS WITHOUT COMPLICATION, WITHOUT LONG-TERM CURRENT USE OF INSULIN: ICD-10-CM

## 2024-02-19 DIAGNOSIS — E66.01 MORBID OBESITY WITH BMI OF 45.0-49.9, ADULT: ICD-10-CM

## 2024-02-19 DIAGNOSIS — Z30.09 FAMILY PLANNING ADVICE: ICD-10-CM

## 2024-02-19 DIAGNOSIS — E11.9 TYPE 2 DIABETES MELLITUS WITHOUT COMPLICATION, WITHOUT LONG-TERM CURRENT USE OF INSULIN: Primary | ICD-10-CM

## 2024-02-19 LAB
ANION GAP SERPL CALC-SCNC: 8 MMOL/L (ref 8–16)
BASOPHILS # BLD AUTO: 0.06 K/UL (ref 0–0.2)
BASOPHILS NFR BLD: 0.7 % (ref 0–1.9)
BUN SERPL-MCNC: 11 MG/DL (ref 6–20)
CALCIUM SERPL-MCNC: 9.5 MG/DL (ref 8.7–10.5)
CHLORIDE SERPL-SCNC: 106 MMOL/L (ref 95–110)
CO2 SERPL-SCNC: 23 MMOL/L (ref 23–29)
CREAT SERPL-MCNC: 0.8 MG/DL (ref 0.5–1.4)
DIFFERENTIAL METHOD BLD: NORMAL
EOSINOPHIL # BLD AUTO: 0.2 K/UL (ref 0–0.5)
EOSINOPHIL NFR BLD: 2.3 % (ref 0–8)
ERYTHROCYTE [DISTWIDTH] IN BLOOD BY AUTOMATED COUNT: 13.2 % (ref 11.5–14.5)
EST. GFR  (NO RACE VARIABLE): >60 ML/MIN/1.73 M^2
ESTIMATED AVG GLUCOSE: 143 MG/DL (ref 68–131)
GLUCOSE SERPL-MCNC: 121 MG/DL (ref 70–110)
HBA1C MFR BLD: 6.6 % (ref 4–5.6)
HCT VFR BLD AUTO: 46 % (ref 37–48.5)
HGB BLD-MCNC: 15.1 G/DL (ref 12–16)
IMM GRANULOCYTES # BLD AUTO: 0.02 K/UL (ref 0–0.04)
IMM GRANULOCYTES NFR BLD AUTO: 0.2 % (ref 0–0.5)
LYMPHOCYTES # BLD AUTO: 3 K/UL (ref 1–4.8)
LYMPHOCYTES NFR BLD: 33.9 % (ref 18–48)
MCH RBC QN AUTO: 29.9 PG (ref 27–31)
MCHC RBC AUTO-ENTMCNC: 32.8 G/DL (ref 32–36)
MCV RBC AUTO: 91 FL (ref 82–98)
MONOCYTES # BLD AUTO: 0.5 K/UL (ref 0.3–1)
MONOCYTES NFR BLD: 5.4 % (ref 4–15)
NEUTROPHILS # BLD AUTO: 5.2 K/UL (ref 1.8–7.7)
NEUTROPHILS NFR BLD: 57.5 % (ref 38–73)
NRBC BLD-RTO: 0 /100 WBC
PLATELET # BLD AUTO: 300 K/UL (ref 150–450)
PMV BLD AUTO: 10.6 FL (ref 9.2–12.9)
POTASSIUM SERPL-SCNC: 4 MMOL/L (ref 3.5–5.1)
RBC # BLD AUTO: 5.05 M/UL (ref 4–5.4)
SODIUM SERPL-SCNC: 137 MMOL/L (ref 136–145)
WBC # BLD AUTO: 8.97 K/UL (ref 3.9–12.7)

## 2024-02-19 PROCEDURE — 83036 HEMOGLOBIN GLYCOSYLATED A1C: CPT | Performed by: INTERNAL MEDICINE

## 2024-02-19 PROCEDURE — 80048 BASIC METABOLIC PNL TOTAL CA: CPT | Performed by: INTERNAL MEDICINE

## 2024-02-19 PROCEDURE — 3044F HG A1C LEVEL LT 7.0%: CPT | Mod: CPTII,S$GLB,, | Performed by: INTERNAL MEDICINE

## 2024-02-19 PROCEDURE — 99999 PR PBB SHADOW E&M-EST. PATIENT-LVL IV: CPT | Mod: PBBFAC,,, | Performed by: INTERNAL MEDICINE

## 2024-02-19 PROCEDURE — 1159F MED LIST DOCD IN RCRD: CPT | Mod: CPTII,S$GLB,, | Performed by: INTERNAL MEDICINE

## 2024-02-19 PROCEDURE — 3079F DIAST BP 80-89 MM HG: CPT | Mod: CPTII,S$GLB,, | Performed by: INTERNAL MEDICINE

## 2024-02-19 PROCEDURE — 85025 COMPLETE CBC W/AUTO DIFF WBC: CPT | Performed by: INTERNAL MEDICINE

## 2024-02-19 PROCEDURE — 99213 OFFICE O/P EST LOW 20 MIN: CPT | Mod: S$GLB,,, | Performed by: INTERNAL MEDICINE

## 2024-02-19 PROCEDURE — 3074F SYST BP LT 130 MM HG: CPT | Mod: CPTII,S$GLB,, | Performed by: INTERNAL MEDICINE

## 2024-02-19 PROCEDURE — 3008F BODY MASS INDEX DOCD: CPT | Mod: CPTII,S$GLB,, | Performed by: INTERNAL MEDICINE

## 2024-02-19 PROCEDURE — 36415 COLL VENOUS BLD VENIPUNCTURE: CPT | Mod: PO | Performed by: INTERNAL MEDICINE

## 2024-02-19 PROCEDURE — 1160F RVW MEDS BY RX/DR IN RCRD: CPT | Mod: CPTII,S$GLB,, | Performed by: INTERNAL MEDICINE

## 2024-02-19 RX ORDER — ERTUGLIFLOZIN 5 MG/1
5 TABLET, FILM COATED ORAL DAILY
Qty: 30 TABLET | Refills: 2 | Status: SHIPPED | OUTPATIENT
Start: 2024-02-19 | End: 2024-03-25 | Stop reason: ALTCHOICE

## 2024-02-19 RX ORDER — INFLUENZA A VIRUS A/VICTORIA/4897/2022 IVR-238 (H1N1) ANTIGEN (FORMALDEHYDE INACTIVATED), INFLUENZA A VIRUS A/DARWIN/9/2021 SAN-010 (H3N2) ANTIGEN (FORMALDEHYDE INACTIVATED), INFLUENZA B VIRUS B/PHUKET/3073/2013 ANTIGEN (FORMALDEHYDE INACTIVATED), AND INFLUENZA B VIRUS B/MICHIGAN/01/2021 ANTIGEN (FORMALDEHYDE INACTIVATED) 15; 15; 15; 15 UG/.5ML; UG/.5ML; UG/.5ML; UG/.5ML
INJECTION, SUSPENSION INTRAMUSCULAR
COMMUNITY
Start: 2023-09-21 | End: 2024-02-19

## 2024-02-19 NOTE — PROGRESS NOTES
Subjective:     Delmis Thrasher is a 38 y.o. female who presents for   Chief Complaint   Patient presents with    Follow-up    Diabetes    Obesity       HPI    Diabetes: Patient presents for follow up of diabetes. Current symptoms include: none. Symptoms have been well-controlled. Patient denies foot ulcerations and paresthesia of the feet. Evaluation to date has included: hemoglobin A1C.  Current treatment:  Ozempic, Steglatro .     Lab Results   Component Value Date    HGBA1C 6.6 (H) 02/19/2024     (H) 02/19/2024     Obesity:  Body mass index is 46.5 kg/m².  Wt Readings from Last 3 Encounters:   02/19/24 108 kg (238 lb 1.6 oz)   11/01/23 108 kg (238 lb)   10/25/23 108.2 kg (238 lb 6.8 oz)   - she likes pasta, sometimes rice and bread, should try Barilla protein plus pasta, also likes veggie pasta with sauce which she has given to her daughter  - she has not yet started exercising but will begin soon  - if she plans to have a child, will need to stop Ozempic and allow a 2-month washout, would also need to stop Steglatro and start insulin        Review of Systems   Constitutional:  Negative for chills, diaphoresis and fever.   HENT:  Negative for congestion, ear pain and sinus pressure.    Respiratory:  Negative for cough and shortness of breath.    Cardiovascular:  Negative for chest pain and palpitations.   Gastrointestinal:  Negative for abdominal pain, constipation, diarrhea, nausea and vomiting.   Endocrine: Negative for polydipsia and polyuria.   Musculoskeletal:  Negative for arthralgias and myalgias.   Skin:  Negative for rash and wound.   Neurological:  Negative for dizziness, tremors and headaches.          Objective:     Physical Exam  Vitals reviewed.   Constitutional:       General: She is awake. She is not in acute distress.     Appearance: Normal appearance. She is well-developed and well-groomed.   HENT:      Head: Normocephalic and atraumatic.      Right Ear: Hearing and external  ear normal.      Left Ear: Hearing and external ear normal.      Nose: Nose normal. No congestion.      Mouth/Throat:      Mouth: Mucous membranes are moist.   Eyes:      General: Lids are normal. Vision grossly intact.   Cardiovascular:      Rate and Rhythm: Normal rate and regular rhythm.      Heart sounds: Normal heart sounds. No murmur heard.  Pulmonary:      Effort: Pulmonary effort is normal.      Breath sounds: Normal breath sounds. No decreased breath sounds or wheezing.   Abdominal:      General: Bowel sounds are normal. There is no distension.   Musculoskeletal:         General: Normal range of motion.      Cervical back: Normal range of motion.      Right lower leg: No edema.      Left lower leg: No edema.   Skin:     General: Skin is warm and dry.      Findings: No lesion or rash.   Neurological:      Mental Status: She is alert and oriented to person, place, and time.   Psychiatric:         Attention and Perception: Attention normal.         Mood and Affect: Mood normal.         Behavior: Behavior is cooperative.            Assessment:      1. Type 2 diabetes mellitus without complication, without long-term current use of insulin    2. Family planning advice    3. Morbid obesity with BMI of 45.0-49.9, adult           Plan:     1. Type 2 diabetes mellitus without complication, without long-term current use of insulin  - controlled, continue Ozempic and Steglatro for now  - Hemoglobin A1C; Future  - Basic Metabolic Panel; Future  - CBC Auto Differential; Future    2. Family planning advice  - patient needs to stop Ozempic and hold for 2 months before trying to get pregnant, would need to stop steglatro too  - advised patient to start exercising regularly and increase intake of healthier foods    3. Morbid obesity with BMI of 45.0-49.9, adult  - continue Ozempic for now but will stop before she tried to get pregnant again, she will also d/w her OB/GYN    RTC in 4 months for follow-up or sooner if  needed    __________________________    Oliva Donnelly MD, PharmD  [facility]- Internal Medicine  American Board of Obesity Medicine diplomate  Office [phone]

## 2024-02-23 ENCOUNTER — PATIENT MESSAGE (OUTPATIENT)
Dept: OBSTETRICS AND GYNECOLOGY | Facility: CLINIC | Age: 39
End: 2024-02-23

## 2024-02-23 ENCOUNTER — OFFICE VISIT (OUTPATIENT)
Dept: OBSTETRICS AND GYNECOLOGY | Facility: CLINIC | Age: 39
End: 2024-02-23
Payer: COMMERCIAL

## 2024-02-23 DIAGNOSIS — N97.9 SECONDARY FEMALE INFERTILITY: Primary | ICD-10-CM

## 2024-02-23 PROCEDURE — 3044F HG A1C LEVEL LT 7.0%: CPT | Mod: CPTII,95,, | Performed by: OBSTETRICS & GYNECOLOGY

## 2024-02-23 PROCEDURE — 1159F MED LIST DOCD IN RCRD: CPT | Mod: CPTII,95,, | Performed by: OBSTETRICS & GYNECOLOGY

## 2024-02-23 PROCEDURE — 1160F RVW MEDS BY RX/DR IN RCRD: CPT | Mod: CPTII,95,, | Performed by: OBSTETRICS & GYNECOLOGY

## 2024-02-23 PROCEDURE — 99213 OFFICE O/P EST LOW 20 MIN: CPT | Mod: 95,,, | Performed by: OBSTETRICS & GYNECOLOGY

## 2024-02-23 NOTE — PROGRESS NOTES
The patient location is: Work  The chief complaint leading to consultation is: Fertility Concerns    Visit type: audiovisual    Face to Face time with patient: 15 minutes  20 minutes of total time spent on the encounter, which includes face to face time and non-face to face time preparing to see the patient (eg, review of tests), Obtaining and/or reviewing separately obtained history, Documenting clinical information in the electronic or other health record, Independently interpreting results (not separately reported) and communicating results to the patient/family/caregiver, or Care coordination (not separately reported).         Each patient to whom he or she provides medical services by telemedicine is:  (1) informed of the relationship between the physician and patient and the respective role of any other health care provider with respect to management of the patient; and (2) notified that he or she may decline to receive medical services by telemedicine and may withdraw from such care at any time.    Notes:     Chief Complaint: Fertility Concerns     HPI:      Delmis Thrasher is a 38 y.o.  who presents today for fertility concerns. Has been trying to conceive for almost 6 months without success. Has been on Ozempic for the past several months and thought she was losing weight but now feels that she may be gaining it back. Chart review shows no weight loss since October.   Pt's PCP has recommended she stop Ozempic x 2 months prior to conceiving. Reviewed with her that this is the FDA recommendation because there is no safety data for Ozempic in pregnancy.      Menses are regular. LMP     Ms. Thrasher is currently sexually active with a single male partner.     Physical Exam:     APPEARANCE: Well nourished, well developed, in no acute distress.    Assessment/Plan:       Secondary female infertility  -     Progesterone; Future; Expected date: 2024        -     3/11 progesterone level to be  drawn to confirm ovulation.         -     Recommended starting OPKs on cycle day 11 and discussed timed intercourse        -     Discussed pros and cons of switching to insulin from Ozempic, and as pt has really not had a weight loss benefit from Ozempic and we have good safety data on insulin, I encouraged this change.          -     Recommended that patient have a consult visit with NANCY to discuss fertility at this point. Message sent through [employer] with contact info.

## 2024-02-26 ENCOUNTER — PATIENT MESSAGE (OUTPATIENT)
Dept: INTERNAL MEDICINE | Facility: CLINIC | Age: 39
End: 2024-02-26
Payer: COMMERCIAL

## 2024-03-02 ENCOUNTER — HOSPITAL ENCOUNTER (EMERGENCY)
Facility: HOSPITAL | Age: 39
Discharge: HOME | End: 2024-03-03
Attending: EMERGENCY MEDICINE
Payer: COMMERCIAL

## 2024-03-02 DIAGNOSIS — S89.90XA INJURY OF CALF: Primary | ICD-10-CM

## 2024-03-02 DIAGNOSIS — M79.669 CALF TENDERNESS: ICD-10-CM

## 2024-03-02 LAB
B-HCG UR QL: NEGATIVE
CTP QC/QA: YES

## 2024-03-02 PROCEDURE — 81025 URINE PREGNANCY TEST: CPT | Performed by: EMERGENCY MEDICINE

## 2024-03-02 PROCEDURE — 99284 EMERGENCY DEPT VISIT MOD MDM: CPT | Mod: 25

## 2024-03-02 RX ORDER — NAPROXEN 500 MG/1
500 TABLET ORAL
Status: COMPLETED | OUTPATIENT
Start: 2024-03-03 | End: 2024-03-02

## 2024-03-02 RX ADMIN — NAPROXEN 500 MG: 500 TABLET ORAL at 11:03

## 2024-03-03 VITALS
TEMPERATURE: 98 F | SYSTOLIC BLOOD PRESSURE: 146 MMHG | OXYGEN SATURATION: 99 % | RESPIRATION RATE: 20 BRPM | HEART RATE: 97 BPM | DIASTOLIC BLOOD PRESSURE: 80 MMHG

## 2024-03-03 PROCEDURE — 25000003 PHARM REV CODE 250: Performed by: EMERGENCY MEDICINE

## 2024-03-03 RX ORDER — NAPROXEN 375 MG/1
375 TABLET ORAL 2 TIMES DAILY WITH MEALS
Qty: 60 TABLET | Refills: 0 | Status: SHIPPED | OUTPATIENT
Start: 2024-03-03 | End: 2024-03-15

## 2024-03-03 NOTE — ED NOTES
Delmis Thrasher, an 38 y.o. female presents to the ED      Chief Complaint   Patient presents with    Leg Pain     Was putting daughter in car and felt a pop in L calf, difficult to ambulate/bear weight, NV intact      Review of patient's allergies indicates:   Allergen Reactions    Metformin Other (See Comments)     Abdominal pain       Past Medical History:   Diagnosis Date    Asthma     Herpes simplex virus (HSV) infection     History of gonorrhea     History of trichomoniasis 10/2018       LOC: The patient is awake, alert, aware of environment with an appropriate affect. Oriented x4, speaking appropriately  APPEARANCE: Pt resting comfortably, in no acute distress, pt is clean and well groomed, clothing properly fastened  SKIN:The skin is warm and dry, color consistent with ethnicity, patient has normal skin turgor and moist mucus membranes, no bruising noted but some irritation PICC line insertion site.  RESPIRATORY:Airway is open and patent, respirations are spontaneous, patient has a normal effort and rate, no accessory muscle use noted.  CARDIAC: Normal rate and rhythm, no peripheral edema noted, capillary refill < 3 seconds, bilateral radial pulses 2+.  ABDOMEN: Soft, non tender, non distended.   NEUROLOGIC: PERRLA, facial expression is symmetrical, patient moving all extremities spontaneously, normal sensation in all extremities when touched with a finger.  Follows all commands appropriately  MUSCULOSKELETAL: + L calf pain 8/10

## 2024-03-03 NOTE — ED PROVIDER NOTES
Encounter Date: 3/2/2024       History     Chief Complaint   Patient presents with    Leg Pain     Was putting daughter in car and felt a pop in L calf, difficult to ambulate/bear weight, NV intact      38-year-old past medical history of asthma presenting with left calf pain.  Patient mentions for last 2-3 days having tightness in left calf, today couple of hours prior to presentation patient was placing child in a car and felt a popping sensation in left calf.  Since patient able to bear weight although with significant pain to mid calf.  Denies any deformity new numbness, tingling, weakness.  No noted swelling over area.      Review of patient's allergies indicates:   Allergen Reactions    Metformin Other (See Comments)     Abdominal pain       Past Medical History:   Diagnosis Date    Asthma     Herpes simplex virus (HSV) infection     History of gonorrhea     History of trichomoniasis 10/2018     Past Surgical History:   Procedure Laterality Date     SECTION N/A 2021    Procedure:  SECTION;  Surgeon: Joan Laureano MD;  Location: [facility] L&D;  Service: OB/GYN;  Laterality: N/A;     SECTION  7/3/2021    DILATION AND CURETTAGE OF UTERUS USING SUCTION N/A 2020    Procedure: DILATION AND CURETTAGE, UTERUS, USING SUCTION;  Surgeon: Sharri Orozco MD;  Location: [facility] OR;  Service: OB/GYN;  Laterality: N/A;    MYRINGOTOMY W/ TUBES       Family History   Problem Relation Age of Onset    Thyroid disease Mother         thyroidectomy    Hypertension Mother     Diabetes Father     Colon cancer Neg Hx     Breast cancer Neg Hx     Cancer Neg Hx     Ovarian cancer Neg Hx      Social History     Tobacco Use    Smoking status: Never    Smokeless tobacco: Never   Substance Use Topics    Alcohol use: Yes     Alcohol/week: 1.0 standard drink of alcohol     Types: 1 Drinks containing 0.5 oz of alcohol per week    Drug use: No     Review of Systems    Physical Exam     Initial  Vitals [03/02/24 2155]   BP Pulse Resp Temp SpO2   (!) 155/84 100 20 98 °F (36.7 °C) 98 %      MAP       --         Physical Exam    Constitutional: She appears well-developed and well-nourished.   HENT:   Head: Normocephalic and atraumatic.   Eyes: Conjunctivae, EOM and lids are normal. Pupils are equal, round, and reactive to light.   Neck: Trachea normal.   Normal range of motion.   Full passive range of motion without pain.     Musculoskeletal:         General: Tenderness present.      Cervical back: Full passive range of motion without pain and normal range of motion.      Comments: Tenderness to palpation mid calf on left.  Negative calf squeeze test on left.  Strength 5/5 bilaterally sensation grossly intact to light touch throughout lower extremity         Neurological: She is alert and oriented to person, place, and time. GCS eye subscore is 4. GCS verbal subscore is 5. GCS motor subscore is 6.   Skin: Skin is intact.   Psychiatric: She has a normal mood and affect. Her speech is normal and behavior is normal. Judgment and thought content normal.         ED Course   Procedures  Labs Reviewed   HIV 1 / 2 ANTIBODY   HEPATITIS C ANTIBODY   POCT URINE PREGNANCY          Imaging Results    None          Medications   naproxen tablet 500 mg (500 mg Oral Given 3/2/24 7064)     Medical Decision Making  38-year-old presenting with left calf injury.    DX includes calf strain, calf sprain, partial rupture, fracture/dislocation although lower likelihood.    Plan: Will obtain x-ray likely DC home.    Patient initially deferring pain medication now requesting naproxen, medication placed will continue to reassess.    Amount and/or Complexity of Data Reviewed  Labs: ordered.  Radiology: ordered.    Risk  Prescription drug management.               ED Course as of 03/03/24 2132   Sun Mar 03, 2024   0021 X-ray interpreted by me no noted fracture or dislocation.  Will DC home with sports Medicine follow-up analgesia.   Patient safe for plan discharge home at this time. [DC]      ED Course User Index  [DC] Jose Elias Flores Jr., MD                           Clinical Impression:  Final diagnoses:  [M79.669] Calf tenderness                 Jose Elias Flores Jr., MD  03/03/24 7971

## 2024-03-04 ENCOUNTER — TELEPHONE (OUTPATIENT)
Dept: ADMINISTRATIVE | Facility: CLINIC | Age: 39
End: 2024-03-04
Payer: COMMERCIAL

## 2024-03-04 ENCOUNTER — TELEPHONE (OUTPATIENT)
Dept: SPORTS MEDICINE | Facility: CLINIC | Age: 39
End: 2024-03-04
Payer: COMMERCIAL

## 2024-03-04 ENCOUNTER — NURSE TRIAGE (OUTPATIENT)
Dept: ADMINISTRATIVE | Facility: CLINIC | Age: 39
End: 2024-03-04
Payer: COMMERCIAL

## 2024-03-04 NOTE — TELEPHONE ENCOUNTER
Patient Symptom: Leg Pain - Not From Injury  Outcome: Talk to a nurse or provider within 15 minutes.  Reason: Trouble walking  Patient agreeable to RN call back.  Patient has sports medicine appointment scheduled for 4/11/2024.  In-basket message sent to provider for call back in the event of a sooner appointment.

## 2024-03-04 NOTE — TELEPHONE ENCOUNTER
Spoke with patient who states she was seen in the ED on Saturday.  Patient states she felt something pop in her left calf.  Patient states the ED provider told her she needs a ultrasound and she would need to be seen by sports medicine to have it done.  Patient states she had an x-ray done at the time of her visit (ED) and she was told their was nothing wrong with her bones. Patient states she was given crutches that were too small without instructions on how to use them.  Patient taking naproxen no relief.  Patient states she is still in pain and cannot bear weight on the left leg (unable to walk). Advised ED per protocol.  Patient verbalized understanding.   Reason for Disposition   Unable to walk     Patient can't walk on the left leg.    Additional Information   Negative: Looks like a broken bone or dislocated joint (e.g., crooked or deformed)   Negative: Sounds like a life-threatening emergency to the triager   Negative: Chest pain   Negative: Difficulty breathing   Negative: Entire foot is cool or blue in comparison to other side    Protocols used: Leg Pain-A-[place]

## 2024-03-04 NOTE — TELEPHONE ENCOUNTER
Attempted to contact pt. Left voicemail, advised that we have availability this week c MIRZA Lopez. Asked pt to return call to clinic at [phone] to schedule.

## 2024-03-04 NOTE — TELEPHONE ENCOUNTER
----- Message from Sun Wheat MA sent at 3/4/2024  1:08 PM CST -----  Regarding: FW: Appt access  Contact: Delmis Thrasher    ----- Message -----  From: Lucy Saleh  Sent: 3/4/2024   1:05 PM CST  To: #  Subject: Appt access                                      Good afternoon,    Patient has appointment in the clinic on 3/11/2024 but would like a call if a sooner appointment is available due to increased pain.  Patient's call back number is [phone].    Sincerely,    Lucy Saleh  ED Navigator

## 2024-03-07 ENCOUNTER — OFFICE VISIT (OUTPATIENT)
Dept: SPORTS MEDICINE | Facility: CLINIC | Age: 39
End: 2024-03-07
Payer: COMMERCIAL

## 2024-03-07 VITALS
SYSTOLIC BLOOD PRESSURE: 126 MMHG | DIASTOLIC BLOOD PRESSURE: 78 MMHG | HEART RATE: 70 BPM | HEIGHT: 60 IN | BODY MASS INDEX: 46.75 KG/M2 | WEIGHT: 238.13 LBS

## 2024-03-07 DIAGNOSIS — S89.90XA INJURY OF CALF: ICD-10-CM

## 2024-03-07 DIAGNOSIS — S86.811A STRAIN OF RIGHT CALF MUSCLE: Primary | ICD-10-CM

## 2024-03-07 PROCEDURE — 99204 OFFICE O/P NEW MOD 45 MIN: CPT | Mod: S$GLB,,, | Performed by: PHYSICIAN ASSISTANT

## 2024-03-07 PROCEDURE — 3074F SYST BP LT 130 MM HG: CPT | Mod: CPTII,S$GLB,, | Performed by: PHYSICIAN ASSISTANT

## 2024-03-07 PROCEDURE — 3044F HG A1C LEVEL LT 7.0%: CPT | Mod: CPTII,S$GLB,, | Performed by: PHYSICIAN ASSISTANT

## 2024-03-07 PROCEDURE — 3008F BODY MASS INDEX DOCD: CPT | Mod: CPTII,S$GLB,, | Performed by: PHYSICIAN ASSISTANT

## 2024-03-07 PROCEDURE — 1159F MED LIST DOCD IN RCRD: CPT | Mod: CPTII,S$GLB,, | Performed by: PHYSICIAN ASSISTANT

## 2024-03-07 PROCEDURE — 3078F DIAST BP <80 MM HG: CPT | Mod: CPTII,S$GLB,, | Performed by: PHYSICIAN ASSISTANT

## 2024-03-07 PROCEDURE — 99999 PR PBB SHADOW E&M-EST. PATIENT-LVL IV: CPT | Mod: PBBFAC,,, | Performed by: PHYSICIAN ASSISTANT

## 2024-03-07 PROCEDURE — 1160F RVW MEDS BY RX/DR IN RCRD: CPT | Mod: CPTII,S$GLB,, | Performed by: PHYSICIAN ASSISTANT

## 2024-03-07 NOTE — PROGRESS NOTES
CC: Left calf pain    38 y.o. Female who presents as a new patient to me. She works at a desk job. Complaint is left calf pain x Saturday (5 days) after feeling a pop in her calf trying to put her kid in the car. Pain localizes over the calf mid way.  Denies swelling. Denies instability.  Worse with plantar flexion/pushing off. Better with rest.Treatment thus far has included rest, activity modifications, oral medications. She was seen in ED on 3/2 where imaging showed no fracture. Taking Naproxen for pain. Here today to discuss diagnosis and treatment options.      PMHx notable for DM.   Negative for tobacco.   Negative for diabetes. Last A1C: 6.5    Pain Score:   7    REVIEW OF SYSTEMS:   Constitution: Negative. Negative for chills, fever and night sweats.    Hematologic/Lymphatic: Negative for bleeding problem. Does not bruise/bleed easily.   Skin: Negative for dry skin, itching and rash.   Musculoskeletal: Negative for falls. Positive for left calf pain and muscle weakness.     All other review of symptoms were reviewed and found to be noncontributory.     PAST MEDICAL HISTORY:   Past Medical History:   Diagnosis Date    Asthma     Herpes simplex virus (HSV) infection     History of gonorrhea     History of trichomoniasis 10/2018       PAST SURGICAL HISTORY:   Past Surgical History:   Procedure Laterality Date     SECTION N/A 2021    Procedure:  SECTION;  Surgeon: Joan Laureano MD;  Location: [facility] L&D;  Service: OB/GYN;  Laterality: N/A;     SECTION  7/3/2021    DILATION AND CURETTAGE OF UTERUS USING SUCTION N/A 2020    Procedure: DILATION AND CURETTAGE, UTERUS, USING SUCTION;  Surgeon: Sharri Orozco MD;  Location: [facility] OR;  Service: OB/GYN;  Laterality: N/A;    MYRINGOTOMY W/ TUBES         FAMILY HISTORY:   Family History   Problem Relation Age of Onset    Thyroid disease Mother         thyroidectomy    Hypertension Mother     Diabetes Father     Colon  cancer Neg Hx     Breast cancer Neg Hx     Cancer Neg Hx     Ovarian cancer Neg Hx        SOCIAL HISTORY:   Social History     Socioeconomic History    Marital status: Single   Tobacco Use    Smoking status: Never    Smokeless tobacco: Never   Substance and Sexual Activity    Alcohol use: Yes     Alcohol/week: 1.0 standard drink of alcohol     Types: 1 Drinks containing 0.5 oz of alcohol per week    Drug use: No    Sexual activity: Yes     Partners: Male     Birth control/protection: OCP     Comment: Single:        MEDICATIONS:     Current Outpatient Medications:     blood sugar diagnostic Strp, 1 strip by Misc.(Non-Drug; Combo Route) route 2 (two) times daily. Please provide items covered by patient's insurance, Disp: 100 strip, Rfl: 5    ertugliflozin (STEGLATRO) 5 mg Tab, Take 5 mg by mouth once daily., Disp: 30 tablet, Rfl: 2    fluticasone propionate (FLONASE) 50 mcg/actuation nasal spray, 1 spray (50 mcg total) by Each Nostril route once daily., Disp: 1 g, Rfl: 1    lancets Misc, 1 lancet by Misc.(Non-Drug; Combo Route) route 2 (two) times daily., Disp: 100 each, Rfl: 5    naproxen (NAPROSYN) 375 MG tablet, Take 1 tablet (375 mg total) by mouth 2 (two) times daily with meals., Disp: 60 tablet, Rfl: 0    naproxen (NAPROSYN) 375 MG tablet, Take 1 tablet (375 mg total) by mouth 2 (two) times daily with meals., Disp: 60 tablet, Rfl: 0    semaglutide (OZEMPIC) 2 mg/dose (8 mg/3 mL) PnIj, Inject 2 mg into the skin every 7 days., Disp: 3 mL, Rfl: 11    valACYclovir (VALTREX) 500 MG tablet, Take 1 tablet by mouth twice daily, Disp: 30 tablet, Rfl: 0    blood-glucose meter kit, 1 each by Other route once. Use as instructed. Provide meter covered by patient's insurance for 1 dose, Disp: 1 each, Rfl: 0    ALLERGIES:   Review of patient's allergies indicates:   Allergen Reactions    Metformin Other (See Comments)     Abdominal pain          PHYSICAL EXAMINATION:  /78   Pulse 70   Ht 5' (1.524 m)   Wt 108 kg (238  lb 1.6 oz)   LMP 02/18/2024   BMI 46.50 kg/m²   General: Well-developed well-nourished 38 y.o. femalein no acute distress   Cardiovascular: Regular rhythm by palpation of distal pulse, normal color and temperature, no concerning varicosities on symptomatic side   Lungs: No labored breathing or wheezing appreciated   Neuro: Alert and oriented ×3   Psychiatric: well oriented to person, place and time, demonstrates normal mood and affect   Skin: No rashes, lesions or ulcers, normal temperature, turgor, and texture on involved extremity    Ortho/SPM Exam  Exam of the left lower leg demonstrates no significant soft tissue swelling or bruising. Tender to palpation over the mid calf/GSC. Negative Bacon. 2+ DP. Examination of the left knee demonstrates intact extensor mechanism. No effusion or prepatellar swelling. Central patellar tracking. No patellar apprehension. Normal patellar mobility. Full extension. Flexion to 130. No pain with forced flexion or extension. No tenderness along the medial and lateral joint line. Negative Alex's. Negative Lachman. Stable to varus/valgus stress testing at 0 and 30 deg. Negative posterior drawer. Ligamentously stable.      IMAGING:  X-rays of the LEFT tib fib:    FINDINGS:  The visualized osseous structures appear intact.  There is mild chronic change noted.  There is no evidence for osseous destructive process, acute fracture or dislocation.  Soft tissue calcifications are noted, there is no radiographically detectable radiopaque foreign body.  Close clinical and historical correlation is needed to determine need for additional follow-up.    ASSESSMENT:      ICD-10-CM ICD-9-CM   1. Injury of calf  S89.90XA 959.7       PLAN:     -Findings and treatment options were discussed with the patient. Right calf strain. Achilles intact. Benign knee exam. We will treat this conservatively.  -I will place her in a boot at this time to give the calf rest. Weight bearing as  tolerated.  -Come out of the boot regularly to do gentle ankle ROM.  -Naprosyn for pain and inflammation.  -Ice for swelling, heat to stretch the calf.  -RTC 2 weeks to see how she is doing.  -All questions answered

## 2024-03-11 ENCOUNTER — LAB VISIT (OUTPATIENT)
Dept: LAB | Facility: HOSPITAL | Age: 39
End: 2024-03-11
Attending: OBSTETRICS & GYNECOLOGY
Payer: COMMERCIAL

## 2024-03-11 ENCOUNTER — PATIENT MESSAGE (OUTPATIENT)
Dept: OBSTETRICS AND GYNECOLOGY | Facility: CLINIC | Age: 39
End: 2024-03-11
Payer: COMMERCIAL

## 2024-03-11 DIAGNOSIS — E11.9 TYPE 2 DIABETES MELLITUS WITHOUT COMPLICATION: ICD-10-CM

## 2024-03-11 DIAGNOSIS — N97.9 SECONDARY FEMALE INFERTILITY: ICD-10-CM

## 2024-03-11 LAB
CHOLEST SERPL-MCNC: 96 MG/DL (ref 120–199)
CHOLEST/HDLC SERPL: 2.7 {RATIO} (ref 2–5)
HDLC SERPL-MCNC: 35 MG/DL (ref 40–75)
HDLC SERPL: 36.5 % (ref 20–50)
LDLC SERPL CALC-MCNC: 39.2 MG/DL (ref 63–159)
NONHDLC SERPL-MCNC: 61 MG/DL
PROGEST SERPL-MCNC: 1.4 NG/ML
TRIGL SERPL-MCNC: 109 MG/DL (ref 30–150)

## 2024-03-11 PROCEDURE — 84144 ASSAY OF PROGESTERONE: CPT | Performed by: OBSTETRICS & GYNECOLOGY

## 2024-03-11 PROCEDURE — 80061 LIPID PANEL: CPT | Performed by: INTERNAL MEDICINE

## 2024-03-11 PROCEDURE — 36415 COLL VENOUS BLD VENIPUNCTURE: CPT | Performed by: INTERNAL MEDICINE

## 2024-03-14 ENCOUNTER — PATIENT MESSAGE (OUTPATIENT)
Dept: OBSTETRICS AND GYNECOLOGY | Facility: CLINIC | Age: 39
End: 2024-03-14
Payer: COMMERCIAL

## 2024-03-14 ENCOUNTER — PATIENT MESSAGE (OUTPATIENT)
Dept: INTERNAL MEDICINE | Facility: CLINIC | Age: 39
End: 2024-03-14
Payer: COMMERCIAL

## 2024-03-15 ENCOUNTER — OFFICE VISIT (OUTPATIENT)
Dept: INTERNAL MEDICINE | Facility: CLINIC | Age: 39
End: 2024-03-15
Payer: COMMERCIAL

## 2024-03-15 DIAGNOSIS — Z30.09 FAMILY PLANNING ADVICE: ICD-10-CM

## 2024-03-15 DIAGNOSIS — E11.9 TYPE 2 DIABETES MELLITUS WITHOUT COMPLICATION, WITHOUT LONG-TERM CURRENT USE OF INSULIN: Primary | ICD-10-CM

## 2024-03-15 PROCEDURE — 1159F MED LIST DOCD IN RCRD: CPT | Mod: CPTII,95,, | Performed by: INTERNAL MEDICINE

## 2024-03-15 PROCEDURE — 3044F HG A1C LEVEL LT 7.0%: CPT | Mod: CPTII,95,, | Performed by: INTERNAL MEDICINE

## 2024-03-15 PROCEDURE — 99213 OFFICE O/P EST LOW 20 MIN: CPT | Mod: 95,,, | Performed by: INTERNAL MEDICINE

## 2024-03-15 PROCEDURE — 1160F RVW MEDS BY RX/DR IN RCRD: CPT | Mod: CPTII,95,, | Performed by: INTERNAL MEDICINE

## 2024-03-15 NOTE — PROGRESS NOTES
Primary Care Telemedicine Note    The patient location is:  Home     The chief complaint leading to consultation is: Diabetes, Planning pregnancy    Total time spent with patient: 35 minutes    Visit type: Virtual visit with synchronous audio only and video      Each patient to whom he or she provides medical services by telemedicine is:  (1) informed of the relationship between the physician and patient and the respective role of any other health care provider with respect to management of the patient; and (2) notified that he or she may decline to receive medical services by telemedicine and may withdraw from such care at any time.        Subjective:        Patient ID: Delmis Thrasher is a 38 y.o. female.    Chief Complaint: Diabetes and family planning      HPI    Diabetes: Patient presents for follow up of diabetes. Current symptoms include: none. Symptoms have erratic with some as low as 120 and sone in 160's. Patient denies nausea and visual disturbances. Evaluation to date has included: hemoglobin A1C.  Home sugars: BGs consistently in an acceptable range. Current treatment:  Ozempic . Last dilated eye exam: due.    The patient is planning to have a baby within the next year. She would like to be switched from Ozempic (every Mon) to insulin now. During the previous pregnancy, she was taking Insulin NPH bid and Regular insulin tid. The patient enjoyed working with Estrella Talbert RN at [facility].    Lab Results   Component Value Date    HGBA1C 6.6 (H) 02/19/2024     (H) 02/19/2024     Wt Readings from Last 3 Encounters:   03/19/24 111.2 kg (245 lb 2.4 oz)   03/07/24 108 kg (238 lb 1.6 oz)   02/19/24 108 kg (238 lb 1.6 oz)   - eats fruit and vegetables, salads  - walks indoors regularly      Review of Systems   Constitutional:  Negative for chills, diaphoresis and fever.   HENT:  Negative for congestion, ear pain, sinus pressure and sore throat.    Respiratory:  Negative for cough and shortness of  breath.    Cardiovascular:  Negative for chest pain and palpitations.   Gastrointestinal:  Negative for abdominal pain, constipation, diarrhea, nausea and vomiting.   Endocrine: Negative for polydipsia and polyuria.   Musculoskeletal:  Negative for arthralgias and myalgias.   Skin:  Negative for rash and wound.   Neurological:  Negative for dizziness, tremors and headaches.           Objective:        Physical Exam  Constitutional:       General: She is not in acute distress.     Appearance: Normal appearance. She is well-developed. She is not toxic-appearing or diaphoretic.   HENT:      Head: Normocephalic and atraumatic.      Right Ear: Hearing normal.      Left Ear: Hearing normal.      Nose: No congestion.   Eyes:      General: Lids are normal.   Pulmonary:      Effort: No tachypnea or respiratory distress.   Musculoskeletal:      Cervical back: Normal range of motion.   Neurological:      Mental Status: She is alert and oriented to person, place, and time.      Comments: Alert and oriented   Psychiatric:         Attention and Perception: Attention normal.         Mood and Affect: Mood normal.         Behavior: Behavior is cooperative.               Assessment:       1. Type 2 diabetes mellitus without complication, without long-term current use of insulin    2. Family planning advice              Plan:     1. Type 2 diabetes mellitus without complication, without long-term current use of insulin  - Ambulatory referral/consult to Diabetes Education; Future  - will determine who can help with dosing and monitoring, referral to Diabetes Eduction    2. Family planning advice  - Ambulatory referral/consult to Diabetes Education; Future    RTC PRN    __________________________    Oliva Donnelly MD, PharmD  [facility]- [facility] Medicine  American Board of Obesity Medicine diplomate  Office [phone]

## 2024-03-18 ENCOUNTER — PATIENT MESSAGE (OUTPATIENT)
Dept: SPORTS MEDICINE | Facility: CLINIC | Age: 39
End: 2024-03-18
Payer: COMMERCIAL

## 2024-03-19 ENCOUNTER — PATIENT MESSAGE (OUTPATIENT)
Dept: INTERNAL MEDICINE | Facility: CLINIC | Age: 39
End: 2024-03-19
Payer: COMMERCIAL

## 2024-03-19 ENCOUNTER — NUTRITION (OUTPATIENT)
Dept: DIABETES | Facility: CLINIC | Age: 39
End: 2024-03-19
Payer: COMMERCIAL

## 2024-03-19 VITALS — BODY MASS INDEX: 47.88 KG/M2 | WEIGHT: 245.13 LBS

## 2024-03-19 DIAGNOSIS — E11.9 TYPE 2 DIABETES MELLITUS WITHOUT COMPLICATION, WITHOUT LONG-TERM CURRENT USE OF INSULIN: ICD-10-CM

## 2024-03-19 DIAGNOSIS — Z30.09 FAMILY PLANNING ADVICE: ICD-10-CM

## 2024-03-19 DIAGNOSIS — E11.9 TYPE 2 DIABETES MELLITUS WITHOUT COMPLICATION, WITH LONG-TERM CURRENT USE OF INSULIN: Primary | ICD-10-CM

## 2024-03-19 DIAGNOSIS — Z79.4 TYPE 2 DIABETES MELLITUS WITHOUT COMPLICATION, WITH LONG-TERM CURRENT USE OF INSULIN: Primary | ICD-10-CM

## 2024-03-19 PROCEDURE — 99999 PR PBB SHADOW E&M-EST. PATIENT-LVL II: CPT | Mod: PBBFAC,,, | Performed by: DIETITIAN, REGISTERED

## 2024-03-19 PROCEDURE — G0108 DIAB MANAGE TRN  PER INDIV: HCPCS | Mod: S$GLB,,, | Performed by: DIETITIAN, REGISTERED

## 2024-03-19 NOTE — PROGRESS NOTES
Diabetes Care Specialist Progress Note  Author: Estrella Talbert RD  Date: 3/19/2024    Program Intake  Reason for Diabetes Program Visit:: Initial Diabetes Assessment  Current diabetes risk level:: low  In the last 12 months, have you:: used emergency room services  Was the ER or hospital admission related to diabetes?: No  Permission to speak with others about care:: yes (Sriram Napoles)  Continuous Glucose Monitoring  Patient has CGM: No    Lab Results   Component Value Date    HGBA1C 6.6 (H) 02/19/2024       Clinical    Weight: 111.2 kg (245 lb 2.4 oz)       Body mass index is 47.88 kg/m².    Patient Health Rating  Compared to other people your age, how would you rate your health?: Good    Problem Review  Reviewed Problem List with Patient: no (see comments)  Active comorbidities affecting diabetes self-care.: no  Reviewed health maintenance: no (see comments)    Clinical Assessment  Current Diabetes Treatment: Injectable (Has quit taking Ozempic and will start insulin in the future, is trying to get pregnant)  Have you ever experienced hypoglycemia (low blood sugar)?: yes  In the last month, how often have you experienced low blood sugar?: other (see comments) (once)  Are you able to tell when your blood sugar is low?: Yes  What symptoms do you experience?: Sweaty, Dizzy/Light-headed  Have you ever been hospitalized because your blood sugar was too low?: no  How do you treat hypoglycemia (low blood sugar)?: other (cheese and crackers)  Have you ever experienced hyperglycemia (high blood sugar)?: no    Medication Information  How do you obtain your medications?: Patient drives  How many days a week do you miss your medications?: Never  Medication adherence impacting ability to self-manage diabetes?: No    Labs  Do you have regular lab work to monitor your medications?: Yes  Type of Regular Lab Work: A1c, Cholesterol, CBC, BMP  Where do you get your labs drawn?: [facility]  Lab Compliance Barriers:  No    Nutritional Status  Diet: Diabetic diet  Meal Plan 24 Hour Recall: Breakfast, Lunch, Dinner, Snack (beverages:sugar beverages)  Meal Plan 24 Hour Recall - Breakfast: english muffin or grits, yogurt, fruit and granola with water or sugar free iced coffee  Meal Plan 24 Hour Recall - Lunch: [employer]-1 hard taco and rice and beans with sugar free drink  Meal Plan 24 Hour Recall - Dinner: green onion suasage with 2 mandarines with sugar free tea  Meal Plan 24 Hour Recall - Snack: popcorn or fruit  Change in appetite?: No  Dentation:: Intact  Recent Changes in Weight: No Recent Weight Change  Current nutritional status an area of need that is impacting patient's ability to self-manage diabetes?: No    Additional Social History    Support  Does anyone support you with your diabetes care?: yes  Who supports you?: significant other  Who takes you to your medical appointments?: self  Does the current support meet the patient's needs?: Yes  Is Support an area impacting ability to self-manage diabetes?: No    Access to Mass Media & Technology  Does the patient have access to any of the following devices or technologies?: Internet Access, Smart phone  Media or technology needs impacting ability to self-manage diabetes?: No    Cognitive/Behavioral Health  Alert and Oriented: Yes  Difficulty Thinking: No  Requires Prompting: No  Requires assistance for routine expression?: No  Cognitive or behavioral barriers impacting ability to self-manage diabetes?: No    Culture/Judaism  Culture or Lutheran beliefs that may impact ability to access healthcare: No    Communication  Language preference: English  Hearing Problems: No  Vision Problems: No  Communication needs impacting ability to self-manage diabetes?: No    Health Literacy  Preferred Learning Method: Face to Face, Demonstration, Hands On, Reading Materials  How often do you need to have someone help you read instructions, pamphlets, or written material from your doctor  or pharmacy?: Never  Health literacy needs impacting ability to self-manage diabetes?: No      Diabetes Self-Management Skills Assessment    Diabetes Disease Process/Treatment Options  Patient/caregiver able to state what happens when someone has diabetes.: somewhat  Patient/caregiver knows what type of diabetes they have.: yes  Diabetes Type : Type II  Patient/caregiver able to identify at least three signs and symptoms of diabetes.: no  Patient able to identify at least three risk factors for diabetes.: no  Diabetes Disease Process/Treatment Options: Skills Assessment Completed: Yes  Assessment indicates:: Knowledge deficit, Instruction Needed  Area of need?: Yes    Nutrition/Healthy Eating  Challenges to healthy eating:: other (see comments) (its not convienent)  Method of carbohydrate measurement:: plate method  Patient can identify foods that impact blood sugar.: yes  Patient-identified foods:: starches (bread, pasta, rice, cereal)  Nutrition/Healthy Eating Skills Assessment Completed:: Yes  Assessment indicates:: Knowledge deficit, Instruction Needed  Area of need?: Yes    Physical Activity/Exercise  Patient's daily activity level:: lightly active  Patient formally exercises outside of work.: yes  Exercise Type: walking, other (see comments) ([employer] exercise videos)  Intensity: Moderate  Frequency: three times a week  Duration: 30 min  Patient can identify forms of physical activity.: yes  Stated forms of physical activity:: any movement performed by muscles that uses energy, chasing after children  Patient can identify reasons why exercise/physical activity is important in diabetes management.: no  Physical Activity/Exercise Skills Assessment Completed: : Yes  Assessment indicates:: Knowledge deficit, Instruction Needed  Area of need?: Yes    Medications  Patient is able to describe current diabetes management routine.: yes  Diabetes management routine:: injectable medications (stopped Ozempic and will start  "insulin due to trying to get pregnant, did not toloerate metformin in the past)  Patient is able to identify current diabetes medications, dosages, and appropriate timing of medications.: no  Patient reports problems or concerns with current medication regimen.: no  Medication Skills Assessment Completed:: Yes  Assessment indicates:: Knowledge deficit, Instruction Needed  Area of need?: Yes    Home Blood Glucose Monitoring  Patient states that blood sugar is checked at home daily.: (P) yes  Monitoring Method:: (P) home glucometer  When do you check your blood sugar?: (P) Before breakfast  When you check what is your typical blood sugar range? : (P) fastin , after lunch and [id number], 120-130 "If I eat really good"  Blood glucose logs:: (P) no, encouraged to keep logs, encouraged to bring logs to provider visits  Blood glucose logs reviewed today?: (P) no  Home Blood Glucose Monitoring Skills Assessment Completed: : (P) Yes  Assessment indicates:: (P) Knowledge deficit, Instruction Needed  Area of need?: (P) Yes    Acute Complications  Patient is able to identify types of acute complications: (P) Yes  Patient Identified:: (P) Hypoglycemia  Patient is able to state the basic meaning of hypoglycemia?: (P) Yes  Able to state the blood sugar range for hypoglycemia?: (P) no (see comments)  Patient can identify general symptoms of hypoglycemia: (P) yes  Patient identified:: (P) dizziness, rapid heartbeat, shakiness, sweating  Able to state proper treatment of hypoglycemia?: (P) no (see comments)  Acute Complications Skills Assessment Completed: : (P) Yes  Assessment indicates:: (P) Knowledge deficit, Instruction Needed  Area of need?: (P) Yes    Chronic Complications  Patient can identify major chronic complications of diabetes.: (P) no  Patient can identify ways to prevent or delay diabetes complications.: (P) yes  Stated ways to prevent complications:: (P) controlling blood sugar, healthy eating and regular " activity  Patient is aware that having diabetes increases risk of heart disease?: (P) No  Patient is aware that heart disease is the leading cause of death and disability in people with diabetes?: (P) No  Patient able to state risk factors for heart disease?: (P) No  Patient is taking statin?: (P) No  Has your doctor talked to you about Statin use?: (P) No  Do you want more information on Statins?: (P) No  Chronic Complications Skills Assessment Completed: : (P) Yes  Assessment indicates:: (P) Knowledge deficit, Instruction Needed  Area of need?: (P) Yes    Psychosocial/Coping  Patient can identify ways of coping with chronic disease.: (P) yes  Patient-stated ways of coping with chronic disease:: (P) support from loved ones  Psychosocial/Coping Skills Assessment Completed: : (P) Yes  Assessment indicates:: (P) Adequate understanding  Area of need?: (P) No      Assessment Summary and Plan    Based on today's diabetes care assessment, the following areas of need were identified:          3/19/2024    12:01 AM   Social   Support No   Access to Mass Media/Tech No   Cognitive/Behavioral Health No   Culture/Confucianism No   Communication No   Health Literacy No            3/19/2024    12:01 AM   Clinical   Medication Adherence No   Lab Compliance No   Nutritional Status No            3/19/2024    12:01 AM   Diabetes Self-Management Skills   Diabetes Disease Process/Treatment Options Yes-Hx of GDM and family hx of type 2 DM   Nutrition/Healthy Eating Yes-see care plan   Physical Activity/Exercise Yes-time   Medication Yes-see care plan   Home Blood Glucose Monitoring Yes-currently SMBG, ed on target BG and timing of monitoring. Logs provided   Acute Complications Yes-time   Chronic Complications Yes-time   Psychosocial/Coping No          Today's interventions were provided through individual discussion, instruction, and written materials were provided.      Patient verbalized understanding of instruction and written  materials.  Pt was able to return back demonstration of instructions today. Patient understood key points, needs reinforcement and further instruction.     Diabetes Self-Management Care Plan:    Today's Diabetes Self-Management Care Plan was developed with Delmis's input. Delmis has agreed to work toward the following goal(s) to improve his/her overall diabetes control.      Care Plan: Diabetes Management   Updates made since 2/18/2024 12:00 AM        Problem: Healthy Eating         Goal: EAt 30-45g of carbs per meal    Start Date: 3/19/2024   Expected End Date: 4/24/2024   Priority: High   Barriers: No Barriers Identified   Note:    3/19/24-Hx of GDM, Ed on carb counting and label reading. Agrees she needs to measure her portions of carbs.        Task: Reviewed the sources and role of Carbohydrate, Protein, and Fat and how each nutrient impacts blood sugar. Completed 3/19/2024        Task: Provided visual examples using dry measuring cups, food models, and other familiar objects such as computer mouse, deck or cards, tennis ball etc. to help with visualization of portions. Completed 3/19/2024        Task: Explained how to count carbohydrates using the food label and the use of dry measuring cups for accurate carb counting. Completed 3/19/2024        Task: Discussed strategies for choosing healthier menu options when dining out.         Task: Recommended replacing beverages containing high sugar content with noncaloric/sugar free options and/or water.         Task: Review the importance of balancing carbohydrates with each meal using portion control techniques to count servings of carbohydrate and label reading to identify serving size and amount of total carbs per serving. Completed 3/19/2024        Task: Provided Sample plate method and reviewed the use of the plate to estimate amounts of carbohydrate per meal. Completed 3/19/2024        Problem: Medications         Goal: Patient Agrees to take Diabetes  Medication as prescribed.    Start Date: 3/19/2024   Expected End Date: 4/24/2024   Priority: Medium   Barriers: Lack of Supplies   Note:    3/19/24-Is now trying to get pregnant. Stopped Ozempic last week. Waiting on providers to prescribe insulin. Did not tolerate metformin. Ed on SE, MOA and dosing of metformin. OBGYN suggested she see a fertility provider which is too expensive. She may consider trying metformin again. She will speak to her Dr. About options that do not involve going to a fertility specialist. Hx of taking insulin in her last pregnancy. Reviewed insulin injection technique, timing, Action time and titration. She has not been prescribed insulin yet.        Task: Reviewed with patient all current diabetes medications and provided basic review of the purpose, dosage, frequency, side effects, and storage of both oral and injectable diabetes medications.         Task: Reviewed possible resources for acquiring cost prohibitive medication. Completed 3/19/2024        Task: Instructed patient on how to self-administer insulin Completed 3/19/2024        Task: Discussed guidelines for preventing, detecting and treating hypoglycemia and hyperglycemia and reviewed the importance of meal and medication timing with diabetes mediations for prevention of hypoglycemia and maximum drug benefit.           Follow Up Plan     No follow-ups on file.    Today's care plan and follow up schedule was discussed with patient.  Delmis verbalized understanding of the care plan, goals, and agrees to follow up plan.        The patient was encouraged to communicate with his/her health care provider/physician and care team regarding his/her condition(s) and treatment.  I provided the patient with my contact information today and encouraged to contact me via phone or [facility] as needed.     Length of Visit   Total Time: 90 Minutes

## 2024-03-21 ENCOUNTER — OFFICE VISIT (OUTPATIENT)
Dept: SPORTS MEDICINE | Facility: CLINIC | Age: 39
End: 2024-03-21
Payer: COMMERCIAL

## 2024-03-21 ENCOUNTER — PATIENT MESSAGE (OUTPATIENT)
Dept: OBSTETRICS AND GYNECOLOGY | Facility: CLINIC | Age: 39
End: 2024-03-21
Payer: COMMERCIAL

## 2024-03-21 DIAGNOSIS — S86.811A STRAIN OF RIGHT CALF MUSCLE: Primary | ICD-10-CM

## 2024-03-21 PROCEDURE — 99213 OFFICE O/P EST LOW 20 MIN: CPT | Mod: 95,,, | Performed by: PHYSICIAN ASSISTANT

## 2024-03-21 PROCEDURE — 3044F HG A1C LEVEL LT 7.0%: CPT | Mod: CPTII,95,, | Performed by: PHYSICIAN ASSISTANT

## 2024-03-21 NOTE — PROGRESS NOTES
Telemedicine/Virtual Visit Documentation:     The patient location is: home    The chief complaint leading to consultation is: see HPI from 3/7/2024    VISIT TYPE X   Virtual visit with synchronous audio and video x   Telephone E/M service      Total time spent with patient: see X dee on chart below.   More than half of the time was spent counseling or coordinating care including prognosis, differential diagnosis, risks and benefits of treatment, instructions, compliance risk reductions     EST MINUTES X   93072 5    69221 10    46591 15 x   99214 25    29333 40    NEW     [id number] 10    70079 20    89028 30    10003 45    99404 60    PHONE      5-10    21620 11-20    59436 21-30       X-ray of the left tib fib:  No acute fracture    Assessment:  Left calf strain    Plan:  Interval virtual follow up with the patient today to see how she is doing. She says she discontinued the boot last week and is doing much better. Denies any pain and is ambulating well. She can follow up prn.

## 2024-03-22 NOTE — TELEPHONE ENCOUNTER
Emailed patient to verify weight.    Start Novolin N  0.2U/kg in 2 divided doses after she provides weight.

## 2024-03-25 RX ORDER — PEN NEEDLE, DIABETIC 29 G X1/2"
1 NEEDLE, DISPOSABLE MISCELLANEOUS 3 TIMES DAILY
Qty: 100 EACH | Refills: 2 | Status: SHIPPED | OUTPATIENT
Start: 2024-03-25

## 2024-03-25 NOTE — TELEPHONE ENCOUNTER
Start Novolin N 10 units before breakfast and 10 units before dinner. Sent to [facility] in [facility]. Syringes also sent.    Stop Steglatro if not already done.     Does she have a blood sugar monitoring log?     She should report BG readings every day as the insulin is started. Check 2-3 times every day.

## 2024-04-05 ENCOUNTER — TELEPHONE (OUTPATIENT)
Dept: INTERNAL MEDICINE | Facility: CLINIC | Age: 39
End: 2024-04-05
Payer: COMMERCIAL

## 2024-04-05 DIAGNOSIS — Z79.4 TYPE 2 DIABETES MELLITUS WITHOUT COMPLICATION, WITH LONG-TERM CURRENT USE OF INSULIN: ICD-10-CM

## 2024-04-05 DIAGNOSIS — E11.9 TYPE 2 DIABETES MELLITUS WITHOUT COMPLICATION, WITH LONG-TERM CURRENT USE OF INSULIN: ICD-10-CM

## 2024-04-10 ENCOUNTER — OFFICE VISIT (OUTPATIENT)
Dept: OBSTETRICS AND GYNECOLOGY | Facility: CLINIC | Age: 39
End: 2024-04-10
Payer: COMMERCIAL

## 2024-04-10 ENCOUNTER — PATIENT MESSAGE (OUTPATIENT)
Dept: OBSTETRICS AND GYNECOLOGY | Facility: CLINIC | Age: 39
End: 2024-04-10
Payer: COMMERCIAL

## 2024-04-10 DIAGNOSIS — Z31.69 PRE-CONCEPTION COUNSELING: Primary | ICD-10-CM

## 2024-04-10 PROCEDURE — 3044F HG A1C LEVEL LT 7.0%: CPT | Mod: CPTII,95,, | Performed by: OBSTETRICS & GYNECOLOGY

## 2024-04-10 PROCEDURE — 1160F RVW MEDS BY RX/DR IN RCRD: CPT | Mod: CPTII,95,, | Performed by: OBSTETRICS & GYNECOLOGY

## 2024-04-10 PROCEDURE — 1159F MED LIST DOCD IN RCRD: CPT | Mod: CPTII,95,, | Performed by: OBSTETRICS & GYNECOLOGY

## 2024-04-10 PROCEDURE — 99213 OFFICE O/P EST LOW 20 MIN: CPT | Mod: 95,,, | Performed by: OBSTETRICS & GYNECOLOGY

## 2024-04-10 NOTE — PROGRESS NOTES
The patient location is: Work  The chief complaint leading to consultation is: Ovulation Induction Options    Visit type: audiovisual    Face to Face time with patient: 20  25 minutes of total time spent on the encounter, which includes face to face time and non-face to face time preparing to see the patient (eg, review of tests), Obtaining and/or reviewing separately obtained history, Documenting clinical information in the electronic or other health record, Independently interpreting results (not separately reported) and communicating results to the patient/family/caregiver, or Care coordination (not separately reported).         Each patient to whom he or she provides medical services by telemedicine is:  (1) informed of the relationship between the physician and patient and the respective role of any other health care provider with respect to management of the patient; and (2) notified that he or she may decline to receive medical services by telemedicine and may withdraw from such care at any time.    Notes:     Chief Complaint: Discuss Ovulation Induction     HPI:      Delmis Thrasher is a 38 y.o.  who presents today to discuss ovulation induction options. Took home OPKs on 3/1 and 3/27 which both came back very positive. Serum progesterone level on 3/11 was negative for ovulation (but based on menses and OPKs was just drawn too late). Had a menstrual cycle on 3/15 - 3/18.      Menses are irregular. No LMP recorded.     Ms. Thrasher is currently sexually active with a single male partner.   Physical Exam:     APPEARANCE: Well nourished, well developed, in no acute distress.    Assessment/Plan:     Pre-conception counseling    Counseled patient on risks of ovulation induction with Letrozole including ovarian hyperstim and visual disturbances.   We discussed increased risks of multifetal gestation and how that increases the risk of pregnancy.   Discussed recommendation to see NANCY based on age, but  that if pt did not wish to go that route I would be willing to do 3 rounds of Letrozole before insisting that she move on with more intensive monitoring/treatment.    Menses based on last date of ovulation is due today. If not period by Monday pt instructed to take a UPT.

## 2024-04-11 NOTE — TELEPHONE ENCOUNTER
Continue insulin NPH 10 units twice daily. Added regular insulin 5 units before meals three times daily.

## 2024-05-01 ENCOUNTER — PATIENT MESSAGE (OUTPATIENT)
Dept: OBSTETRICS AND GYNECOLOGY | Facility: CLINIC | Age: 39
End: 2024-05-01
Payer: COMMERCIAL

## 2024-05-01 DIAGNOSIS — N97.0 ANOVULATION: Primary | ICD-10-CM

## 2024-05-01 RX ORDER — LETROZOLE 2.5 MG/1
2.5 TABLET, FILM COATED ORAL DAILY
Qty: 5 TABLET | Refills: 3 | Status: SHIPPED | OUTPATIENT
Start: 2024-05-01 | End: 2024-05-06

## 2024-05-31 ENCOUNTER — TELEPHONE (OUTPATIENT)
Dept: INTERNAL MEDICINE | Facility: CLINIC | Age: 39
End: 2024-05-31
Payer: COMMERCIAL

## 2024-05-31 NOTE — TELEPHONE ENCOUNTER
Refill Routing Note   Medication(s) are not appropriate for processing by [facility] for the following reason(s):        New or recently adjusted medication    [facility] action(s):  Defer     Requires labs : Yes             Appointments  past 12m or future 3m with PCP    Date Provider   Last Visit   3/15/2024 Oliva Donnelly MD   Next Visit   6/19/2024 Oliva Donnelly MD   ED visits in past 90 days: 1        Note composed:10:01 AM 05/31/2024

## 2024-05-31 NOTE — TELEPHONE ENCOUNTER
Care Due:                  Date            Visit Type   Department     Provider  --------------------------------------------------------------------------------                                ESTABLISHED                              PATIENT -    [facility] INTERNAL  Last Visit: 03-      VIRTUAL      MEDICINE       Oliva Donnelly                               -                              PRIMARY      [facility] INTERNAL  Next Visit: 06-      CARE ([facility])   MEDICINE       Oliva Donnelly                                                            Last  Test          Frequency    Reason                     Performed    Due Date  --------------------------------------------------------------------------------    HBA1C.......  6 months...  insulin..................  02- 08-    [facility] Embedded Care Due Messages. Reference number: [id number].   5/31/2024 9:27:28 AM CDT

## 2024-06-04 RX ORDER — INSULIN HUMAN 100 [IU]/ML
INJECTION, SOLUTION PARENTERAL
Qty: 10 ML | Refills: 1 | Status: SHIPPED | OUTPATIENT
Start: 2024-06-04 | End: 2024-06-05

## 2024-06-06 NOTE — TELEPHONE ENCOUNTER
Refill Routing Note   Medication(s) are not appropriate for processing by [facility] for the following reason(s):        No active prescription written by provider    [facility] action(s):  Defer             Appointments  past 12m or future 3m with PCP    Date Provider   Last Visit   3/15/2024 Oliva Donnelly MD   Next Visit   6/19/2024 Oliva Donnelly MD   ED visits in past 90 days: 0        Note composed:11:12 PM 06/05/2024

## 2024-06-06 NOTE — TELEPHONE ENCOUNTER
No care due was identified.  [facility] Embedded Care Due Messages. Reference number: [id number].   6/05/2024 11:09:12 PM CDT

## 2024-06-09 ENCOUNTER — PATIENT MESSAGE (OUTPATIENT)
Dept: INTERNAL MEDICINE | Facility: CLINIC | Age: 39
End: 2024-06-09
Payer: COMMERCIAL

## 2024-06-10 NOTE — TELEPHONE ENCOUNTER
No care due was identified.  [facility] Embedded Care Due Messages. Reference number: [id number].   6/10/2024 8:20:19 AM CDT

## 2024-06-11 ENCOUNTER — TELEPHONE (OUTPATIENT)
Dept: INTERNAL MEDICINE | Facility: CLINIC | Age: 39
End: 2024-06-11
Payer: COMMERCIAL

## 2024-06-11 NOTE — TELEPHONE ENCOUNTER
----- Message from Lianet Feliz MA sent at 6/11/2024  8:30 AM CDT -----  Contact: Mary @[facility] [phone]    ----- Message -----  From: Veena Casas  Sent: 6/10/2024   4:57 PM CDT  To: Cony ALVAREZ Staff    Pharmacy is calling to clarify an RX.    RX name:  insulin  unit/mL injection     What do they need to clarify:      Comments: Asking to switch to another insulin.

## 2024-06-11 NOTE — TELEPHONE ENCOUNTER
[facility] calling for substitution for insulin nph 100  Prescribed 6/10.    I called pharmacy to inquire.  Pens come in 3ml.  She usually gets 10ml vials.    I gave him verbal to change. Pended rx and marked  As phoned in to show in chart for next fill.    Please sign to chart.    Thanks baudilio

## 2024-06-11 NOTE — TELEPHONE ENCOUNTER
No care due was identified.  [facility] Embedded Care Due Messages. Reference number: [id number].   6/11/2024 9:15:41 AM CDT

## 2024-06-12 RX ORDER — INSULIN HUMAN 100 [IU]/ML
INJECTION, SUSPENSION SUBCUTANEOUS
Qty: 9 ML | Refills: 2 | Status: SHIPPED | OUTPATIENT
Start: 2024-06-12

## 2024-06-12 RX ORDER — INSULIN HUMAN 100 [IU]/ML
INJECTION, SUSPENSION SUBCUTANEOUS
Qty: 10 ML | Refills: 3 | Status: SHIPPED | OUTPATIENT
Start: 2024-06-12 | End: 2024-06-12

## 2024-06-12 RX ORDER — PEN NEEDLE, DIABETIC 30 GX3/16"
1 NEEDLE, DISPOSABLE MISCELLANEOUS 2 TIMES DAILY
Qty: 90 EACH | Refills: 1 | Status: SHIPPED | OUTPATIENT
Start: 2024-06-12

## 2024-06-12 NOTE — TELEPHONE ENCOUNTER
Changed to Humulin Kwikpen 10 units before breakfast and 20 units before dinner. Also entered pen needles.

## 2024-06-19 ENCOUNTER — CLINICAL SUPPORT (OUTPATIENT)
Dept: DIABETES | Facility: CLINIC | Age: 39
End: 2024-06-19
Payer: COMMERCIAL

## 2024-06-19 ENCOUNTER — OFFICE VISIT (OUTPATIENT)
Dept: INTERNAL MEDICINE | Facility: CLINIC | Age: 39
End: 2024-06-19
Payer: COMMERCIAL

## 2024-06-19 VITALS
TEMPERATURE: 98 F | RESPIRATION RATE: 18 BRPM | DIASTOLIC BLOOD PRESSURE: 80 MMHG | SYSTOLIC BLOOD PRESSURE: 112 MMHG | HEART RATE: 65 BPM | WEIGHT: 250.69 LBS | OXYGEN SATURATION: 99 % | HEIGHT: 60 IN | BODY MASS INDEX: 49.22 KG/M2

## 2024-06-19 DIAGNOSIS — E11.9 TYPE 2 DIABETES MELLITUS WITHOUT COMPLICATION, WITHOUT LONG-TERM CURRENT USE OF INSULIN: Primary | ICD-10-CM

## 2024-06-19 DIAGNOSIS — E11.9 TYPE 2 DIABETES MELLITUS WITHOUT COMPLICATION, WITHOUT LONG-TERM CURRENT USE OF INSULIN: ICD-10-CM

## 2024-06-19 DIAGNOSIS — E66.01 MORBID OBESITY WITH BMI OF 45.0-49.9, ADULT: ICD-10-CM

## 2024-06-19 DIAGNOSIS — Z00.00 ANNUAL PHYSICAL EXAM: Primary | ICD-10-CM

## 2024-06-19 PROCEDURE — G0108 DIAB MANAGE TRN  PER INDIV: HCPCS | Mod: 95,,, | Performed by: DIETITIAN, REGISTERED

## 2024-06-19 PROCEDURE — 99999 PR PBB SHADOW E&M-EST. PATIENT-LVL IV: CPT | Mod: PBBFAC,,, | Performed by: INTERNAL MEDICINE

## 2024-06-19 PROCEDURE — 1159F MED LIST DOCD IN RCRD: CPT | Mod: CPTII,S$GLB,, | Performed by: INTERNAL MEDICINE

## 2024-06-19 PROCEDURE — 1160F RVW MEDS BY RX/DR IN RCRD: CPT | Mod: CPTII,S$GLB,, | Performed by: INTERNAL MEDICINE

## 2024-06-19 PROCEDURE — 3008F BODY MASS INDEX DOCD: CPT | Mod: CPTII,S$GLB,, | Performed by: INTERNAL MEDICINE

## 2024-06-19 PROCEDURE — 3044F HG A1C LEVEL LT 7.0%: CPT | Mod: CPTII,S$GLB,, | Performed by: INTERNAL MEDICINE

## 2024-06-19 PROCEDURE — 99213 OFFICE O/P EST LOW 20 MIN: CPT | Mod: S$GLB,,, | Performed by: INTERNAL MEDICINE

## 2024-06-19 PROCEDURE — 3079F DIAST BP 80-89 MM HG: CPT | Mod: CPTII,S$GLB,, | Performed by: INTERNAL MEDICINE

## 2024-06-19 PROCEDURE — 3074F SYST BP LT 130 MM HG: CPT | Mod: CPTII,S$GLB,, | Performed by: INTERNAL MEDICINE

## 2024-06-19 NOTE — PROGRESS NOTES
Subjective:     Delmis Thrasher is a 38 y.o. female who presents for   Chief Complaint   Patient presents with    Follow-up    Diabetes    Obesity       HPI    Diabetes: Patient presents for follow up of diabetes. Current symptoms include: hyperglycemia. Symptoms have gradually worsened. Patient denies foot ulcerations, nausea, and vomiting. Evaluation to date has included: hemoglobin A1C.  Home sugars: BGs have been labile ranging between 180 and 200's . Current treatment:  switched to insulin . Last dilated eye exam: due.    BG managed by OB/GYN Dr. Sharla Dover who she will see tomorrow. BG may be higher due to carrots in salad, apple/peanut butter combination and sugar-free juice which she will stop.    HbA1c has increased to 9.7!!    Lab Results   Component Value Date    HGBA1C 6.6 (H) 02/19/2024     (H) 02/19/2024       Body mass index is 48.95 kg/m².  Wt Readings from Last 3 Encounters:   06/19/24 113.7 kg (250 lb 10.6 oz)   03/19/24 111.2 kg (245 lb 2.4 oz)   03/07/24 108 kg (238 lb 1.6 oz)   - has been walking indoors with [employer] workout or [employer] 3x weekly      Review of Systems   Constitutional:  Negative for chills, diaphoresis and fever.   HENT:  Negative for congestion, ear pain, sinus pressure and sore throat.    Eyes:  Negative for discharge and redness.   Respiratory:  Negative for cough and shortness of breath.    Cardiovascular:  Negative for chest pain, palpitations and leg swelling.   Gastrointestinal:  Negative for abdominal pain, constipation, diarrhea, nausea and vomiting.   Endocrine: Negative for polydipsia and polyuria.   Musculoskeletal:  Negative for arthralgias and myalgias.   Skin:  Negative for rash and wound.   Neurological:  Negative for dizziness, tremors and headaches.          Objective:     Physical Exam  Vitals reviewed.   Constitutional:       General: She is awake. She is not in acute distress.     Appearance: Normal appearance. She is well-developed  and well-groomed.   HENT:      Head: Normocephalic and atraumatic.      Right Ear: Hearing and external ear normal.      Left Ear: Hearing and external ear normal.      Nose: Nose normal. No congestion.      Mouth/Throat:      Mouth: Mucous membranes are moist.   Eyes:      General: Lids are normal. Vision grossly intact.   Cardiovascular:      Rate and Rhythm: Normal rate and regular rhythm.      Heart sounds: Normal heart sounds. No murmur heard.  Pulmonary:      Effort: Pulmonary effort is normal.      Breath sounds: Normal breath sounds. No decreased breath sounds or wheezing.   Abdominal:      General: Bowel sounds are normal. There is no distension.   Musculoskeletal:         General: Normal range of motion.      Cervical back: Normal range of motion.      Right lower leg: No edema.      Left lower leg: No edema.   Skin:     General: Skin is warm and dry.      Findings: No lesion or rash.   Neurological:      Mental Status: She is alert and oriented to person, place, and time.   Psychiatric:         Attention and Perception: Attention normal.         Mood and Affect: Mood normal.         Behavior: Behavior is cooperative.            Assessment:      1. Type 2 diabetes mellitus without complication, without long-term current use of insulin    2. Morbid obesity with BMI of 45.0-49.9, adult           Plan:     1. Type 2 diabetes mellitus without complication, without long-term current use of insulin  - needs more insulin but will be adjusted soon  - eliminate: carrots, any type of juice (even sugar-free) and may try any very low to no-carb or keto-friendly snacks    2. Morbid obesity with BMI of 45.0-49.9, adult  - continue to exercise regularly, limit sugars, will monitor    RTC in October 2024 for annual exam or sooner if needed    __________________________    Oliva Donnelly MD, PharmD  [facility]- [facility] Medicine  American Board of Obesity Medicine diplomate  Office [phone]

## 2024-06-21 NOTE — PROGRESS NOTES
Diabetes Care Specialist Virtual Visit Note       The patient location is: home  The chief complaint leading to consultation is: Diabetes  Visit type: audiovisual  Total time spent with patient: 30 min   Each patient to whom he or she provides medical services by telemedicine is:  (1) informed of the relationship between the physician and patient and the respective role of any other health care provider with respect to management of the patient; and (2) notified that he or she may decline to receive medical services by telemedicine and may withdraw from such care at any time.    Diabetes Care Specialist Progress Note  Author: Estrella Talbert RD  Date: 6/21/2024    Program Intake  Reason for Diabetes Program Visit:: Intervention  Type of Intervention:: Individual  Individual: Education  Education: Pattern Management, Nutrition and Meal Planning    Lab Results   Component Value Date    HGBA1C 6.6 (H) 02/19/2024       Clinical            There is no height or weight on file to calculate BMI.                                  Additional Social History                                    Diabetes Self-Management Skills Assessment         Nutrition/Healthy Eating  Area of need?: Yes         Medications  Area of need?: Yes    Home Blood Glucose Monitoring  Area of need?: Yes                     Assessment Summary and Plan    Based on today's diabetes care assessment, the following areas of need were identified:          3/19/2024    12:01 AM   Social   Support No   Access to Mass Media/Tech No   Cognitive/Behavioral Health No   Culture/Mosque No   Communication No   Health Literacy No            3/19/2024    12:01 AM   Clinical   Medication Adherence No   Lab Compliance No   Nutritional Status No            6/19/2024    12:02 AM   Diabetes Self-Management Skills   Nutrition/Healthy Eating Yes-see care plan   Medication Yes-see care plan   Home Blood Glucose Monitoring Yes-see care plan          Today's interventions  were provided through individual discussion, instruction, and written materials were provided.      Patient verbalized understanding of instruction and written materials.  Pt was able to return back demonstration of instructions today. Patient understood key points, needs reinforcement and further instruction.     Diabetes Self-Management Care Plan:    Today's Diabetes Self-Management Care Plan was developed with Delmis's input. Delmis has agreed to work toward the following goal(s) to improve his/her overall diabetes control.      Care Plan: Diabetes Management   Updates made since 5/22/2024 12:00 AM        Problem: Healthy Eating         Long-Range Goal: EAt 30-45g of carbs per meal    Start Date: 3/19/2024   Expected End Date: 4/24/2024   This Visit's Progress: On track   Priority: High   Barriers: No Barriers Identified   Note:    3/19/24-Hx of GDM, Ed on carb counting and label reading. Agrees she needs to measure her portions of carbs.     6/19/24-B:Nonfat greek yogurt with fresh strawberries or blueberries and Iced coffee with SF flavoring. Lunch:Chicken burger, no bun and vegetables or a grilled chicken salad with water Dinner:Low carb tortilla with salsa or a chicken chelsy with vegetables and water. Snacks on peanut butter and apple or fresh cherries (a hand full) or popcorn, or a Premier protein shake. She has cut out many carbs trying to control her BG.         Problem: Medications         Goal: Patient Agrees to take Diabetes Medication as prescribed.    Start Date: 3/19/2024   Expected End Date: 4/24/2024   This Visit's Progress: On track   Priority: Medium   Barriers: Lack of Supplies   Note:    3/19/24-Is now trying to get pregnant. Stopped Ozempic last week. Waiting on providers to prescribe insulin. Did not tolerate metformin. Ed on SE, MOA and dosing of metformin. OBGYN suggested she see a fertility provider which is too expensive. She may consider trying metformin again. She will speak to her   About options that do not involve going to a fertility specialist. Hx of taking insulin in her last pregnancy. Reviewed insulin injection technique, timing, Action time and titration. She has not been prescribed insulin yet.     24-Taking 10 units of NPH in the morning and 20 units of NPH in the evening. Also taking 10 units of Regular insulin before each meal. BG is not well controlled. Has changed OBGYN who is willing to adjust her insulin. Using vial and syringe, NPH was just changed to pens. She will discuss switching to Lantus(or other updated long acting insulin) and Humalog or Novolog for meal time insulin with her OBGYN tomorrow. Her OBGYN is at [facility].        Problem: Blood Glucose Self-Monitoring         Goal: Patient agrees to continue to check and record blood sugars 3 times per day.    Start Date: 2024   This Visit's Progress: On track   Priority: Medium   Barriers: No Barriers Identified   Note:    24-SMBG before all meals. Fastin's but was 206 this morning. Before lunch:in the 200's, Before dinner:in the 200's. Never in the 300's. Bg are not in range despite taking insulin and eating very low carb meals. Needs insulin adjustments.        Task: Provided patient with a meter today and sent Rx request to provider to send to patients pharmacy.         Task: Reviewed the importance of self-monitoring blood glucose and keeping logs.         Task: Instructed on how to self-monitor blood glucose using a home glucometer, how to properly dispose of used strips and lancets after use, and how to appropriately store meter and supplies.         Task: Provided patient with blood glucose logs, reviewed appropriate timing and frequency to SMBG, education on parameters on when to notify provider and advised patient to bring logs to all appts with PCP/Endocrinologist/Diabetes Care Specialist.         Task: Discussed ways to minimize pain when monitoring blood glucose.           Follow Up Plan      No follow-ups on file.    Today's care plan and follow up schedule was discussed with patient.  Delmis verbalized understanding of the care plan, goals, and agrees to follow up plan.        The patient was encouraged to communicate with his/her health care provider/physician and care team regarding his/her condition(s) and treatment.  I provided the patient with my contact information today and encouraged to contact me via phone or [facility] Patient Portal as needed.     Length of Visit   Total Time: 30 Minutes

## 2024-07-10 ENCOUNTER — TELEPHONE (OUTPATIENT)
Dept: INTERNAL MEDICINE | Facility: CLINIC | Age: 39
End: 2024-07-10
Payer: COMMERCIAL

## 2024-07-10 ENCOUNTER — CLINICAL SUPPORT (OUTPATIENT)
Dept: DIABETES | Facility: CLINIC | Age: 39
End: 2024-07-10
Payer: COMMERCIAL

## 2024-07-10 DIAGNOSIS — E11.9 TYPE 2 DIABETES MELLITUS WITHOUT COMPLICATION, WITHOUT LONG-TERM CURRENT USE OF INSULIN: Primary | ICD-10-CM

## 2024-07-10 NOTE — PROGRESS NOTES
Diabetes Care Specialist Virtual Visit Note       The patient location is: work  The chief complaint leading to consultation is: Diabetes  Visit type: audiovisual  Total time spent with patient: 30 min   Each patient to whom he or she provides medical services by telemedicine is:  (1) informed of the relationship between the physician and patient and the respective role of any other health care provider with respect to management of the patient; and (2) notified that he or she may decline to receive medical services by telemedicine and may withdraw from such care at any time.    Diabetes Care Specialist Progress Note  Author: Estrella Talbert RD  Date: 7/10/2024    Program Intake  Reason for Diabetes Program Visit:: Intervention  Type of Intervention:: Individual  Individual: Education  Education: Self-Management Skill Review    Lab Results   Component Value Date    HGBA1C 6.6 (H) 02/19/2024       Clinical            There is no height or weight on file to calculate BMI.                                  Additional Social History                                    Diabetes Self-Management Skills Assessment                                              Assessment Summary and Plan    Based on today's diabetes care assessment, the following areas of need were identified:          3/19/2024    12:01 AM   Social   Support No   Access to Mass Media/Tech No   Cognitive/Behavioral Health No   Culture/Jain No   Communication No   Health Literacy No            3/19/2024    12:01 AM   Clinical   Medication Adherence No   Lab Compliance No   Nutritional Status No            6/19/2024    12:02 AM   Diabetes Self-Management Skills   Nutrition/Healthy Eating Yes-see care plan   Medication Yes-see care plan   Home Blood Glucose Monitoring Yes-see care plan          Today's interventions were provided through individual discussion, instruction, and written materials were provided.      Patient verbalized understanding of  instruction and written materials.  Pt was able to return back demonstration of instructions today. Patient understood key points, needs reinforcement and further instruction.     Diabetes Self-Management Care Plan:    Today's Diabetes Self-Management Care Plan was developed with Delmis's input. Delmis has agreed to work toward the following goal(s) to improve his/her overall diabetes control.      Care Plan: Diabetes Management   Updates made since 6/10/2024 12:00 AM        Problem: Healthy Eating         Long-Range Goal: EAt 30-45g of carbs per meal    Start Date: 3/19/2024   Expected End Date: 4/24/2024   This Visit's Progress: On track   Recent Progress: On track   Priority: High   Barriers: No Barriers Identified   Note:    3/19/24-Hx of GDM, Ed on carb counting and label reading. Agrees she needs to measure her portions of carbs.     6/19/24-B:Nonfat greek yogurt with fresh strawberries or blueberries and Iced coffee with SF flavoring. Lunch:Chicken burger, no bun and vegetables or a grilled chicken salad with water Dinner:Low carb tortilla with salsa or a chicken chelsy with vegetables and water. Snacks on peanut butter and apple or fresh cherries (a hand full) or popcorn, or a Premier protein shake. She has cut out many carbs trying to control her BG.      7/10/24-B: greek yogurt with blueberries, strawberries and stevia or honey or eggs with sausage chelsy with milk or water or coffee with SF creamer, Lunch: salad or red beans and cauliflower rice. Dinner:grilled chicken and vegetables. Avoiding all pasta and substituting cauliflower rice, increased her intake of salads.  Suggested she add a high fiber carb with dinner to avoid nocturnal hypoglycemia.        Problem: Medications         Goal: Patient Agrees to take Diabetes Medication as prescribed.    Start Date: 3/19/2024   Expected End Date: 4/24/2024   This Visit's Progress: On track   Recent Progress: On track   Priority: Medium   Barriers: Lack of  Supplies   Note:    3/19/24-Is now trying to get pregnant. Stopped Ozempic last week. Waiting on providers to prescribe insulin. Did not tolerate metformin. Ed on SE, MOA and dosing of metformin. OBGYN suggested she see a fertility provider which is too expensive. She may consider trying metformin again. She will speak to her DrJohn About options that do not involve going to a fertility specialist. Hx of taking insulin in her last pregnancy. Reviewed insulin injection technique, timing, Action time and titration. She has not been prescribed insulin yet.     24-Taking 10 units of NPH in the morning and 20 units of NPH in the evening. Also taking 10 units of Regular insulin before each meal. BG is not well controlled. Has changed OBGYN who is willing to adjust her insulin. Using vial and syringe, NPH was just changed to pens. She will discuss switching to Lantus(or other updated long acting insulin) and Humalog or Novolog for meal time insulin with her OBGYN tomorrow. Her OBGYN is at [facility].     7/10/24-Taking 20 untis of NPH in the morning and 25 units of NPH in the evening. 2 hypoglycemic events in the am this week. Is taking 15 of regular with meals. Would like to see Endocrinology, Anne Guajardo for an appointment.       Problem: Blood Glucose Self-Monitoring         Goal: Patient agrees to continue to check and record blood sugars 3 times per day.    Start Date: 2024   This Visit's Progress: On track   Recent Progress: On track   Priority: Medium   Barriers: No Barriers Identified   Note:    24-SMBG before all meals. Fastin's but was 206 this morning. Before lunch:in the 200's, Before dinner:in the 200's. Never in the 300's. Bg are not in range despite taking insulin and eating very low carb meals. Needs insulin adjustments.     7/10/24-Fastin's, 140-160 PP meals. Has had a few hypoglycemic events that have woken her up. Cont to SMBG 3-4 times per day. Fasting, PP meals. BG much  improved. Her provider at [facility] prescribed a Dexcom. She is going to pick it up and schedule a virtual with me to help her put it on.       Task: Provided patient with a meter today and sent Rx request to provider to send to patients pharmacy.         Task: Reviewed the importance of self-monitoring blood glucose and keeping logs.         Task: Instructed on how to self-monitor blood glucose using a home glucometer, how to properly dispose of used strips and lancets after use, and how to appropriately store meter and supplies.         Task: Provided patient with blood glucose logs, reviewed appropriate timing and frequency to SMBG, education on parameters on when to notify provider and advised patient to bring logs to all appts with PCP/Endocrinologist/Diabetes Care Specialist.         Task: Discussed ways to minimize pain when monitoring blood glucose.           Follow Up Plan     No follow-ups on file.    Today's care plan and follow up schedule was discussed with patient.  Delmis verbalized understanding of the care plan, goals, and agrees to follow up plan.        The patient was encouraged to communicate with his/her health care provider/physician and care team regarding his/her condition(s) and treatment.  I provided the patient with my contact information today and encouraged to contact me via phone or [facility] Patient Portal as needed.     Length of Visit   Total Time: 30 Minutes

## 2024-07-10 NOTE — TELEPHONE ENCOUNTER
----- Message from ARIE Weber, EMMA sent at 7/10/2024 11:56 AM CDT -----  Regarding: RE: appointment  Anaid Zaman!  I'll have Renata reach out to her.  Dangelo  ----- Message -----  From: Estrella Talbert RD  Sent: 7/10/2024  10:25 AM CDT  To: ARIE Weber, EMMA  Subject: appointment                                      Abraham Pierson,  I have a patient who would really benefit from seeing you. She is currently on MDI but on NPH and Regular. Would you mind seeing her? Her best contact information is [phone].   Thanks,  Junie Talbert

## 2024-07-11 NOTE — PROGRESS NOTES
CHIEF COMPLAINT: Type 2 Diabetes     HPI: Mrs. Delmis Thrasher is a 38 y.o. female who was diagnosed with gestational diabetes, Type 2 DM in 2021.  Has 3 year old-daughter   Looking to family plan again.  Lab Results   Component Value Date    HGBA1C 6.6 (H) 02/19/2024     Being seen by me again today.      Getting dexcom g7.     [place] Dr. Sharla [facility] [place]    3 meals a day   Wt watchers prior   Cauliflower rice, red beans      PREVIOUS DIABETES MEDICATIONS TRIED  Ozempic  Nph  Regular   Metformin - gi /severe    CURRENT DIABETIC MEDS: nph 20 units in am, 25 units at night, regular 15-15-15 units before meals     On MDI (injections 4 x a day)  Makes frequent changes to his/her insulin regimen on the basis of blood glucose data  Testing 4 x a day  Patient is willing and able to use the device  Demonstrated an understanding of the technology and is motivated to use CGM  Patient expected to adhere to a comprehensive diabetes treatment plan and patient has adequate medical supervision  Patient experiences recurrent, unexplained, severe (lows of <50 mg/dl) hypoglycemia   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    Diabetes Management Status    Statin: Not taking  ACE/ARB: Not taking    Screening or Prevention Patient's value Goal Complete/Controlled?   HgA1C Testing and Control   Lab Results   Component Value Date    HGBA1C 6.6 (H) 02/19/2024      Annually/Less than 8% Yes   Lipid profile : 03/11/2024 Annually Yes   LDL control Lab Results   Component Value Date    LDLCALC 39.2 (L) 03/11/2024    Annually/Less than 100 mg/dl  Yes   Nephropathy screening Lab Results   Component Value Date    LABMICR 15.0 10/10/2023     Lab Results   Component Value Date    PROTEINUA Negative 10/17/2023    Annually Yes   Blood pressure BP Readings from Last 1 Encounters:   06/19/24 112/80    Less than 140/90 Yes   Dilated retinal exam Most Recent Eye Exam Date:  Not Found Annually Yes   Foot exam   : 08/27/2021 Annually Yes     REVIEW OF SYSTEMS  General: no weakness, fatigue, + weight changes 16# gain   Eyes: no double or blurred vision, eye pain, or redness  Cardiovascular: no chest pain, palpitations, edema, or murmurs.   Respiratory: no cough or dyspnea.   GI: no heartburn, nausea, or changes in bowel patterns; good appetite.   Skin: no rashes, dryness, itching, or reactions at insulin injection sites.  Neuro: no numbness, tingling, tremors, or vertigo. (R) arm- numbness at times-worsens at night -intermittent   Endocrine: no polyuria, polydipsia, polyphagia, heat or cold intolerance.     Vital Signs  /78 (BP Location: Left arm, Patient Position: Sitting, BP Method: Large (Manual))   Pulse 70   Ht 5' (1.524 m)   Wt 115.6 kg (254 lb 13.6 oz)   LMP 06/05/2024 (Exact Date)   SpO2 98%   BMI 49.77 kg/m²     Hemoglobin A1C   Date Value Ref Range Status   02/19/2024 6.6 (H) 4.0 - 5.6 % Final     Comment:     ADA Screening Guidelines:  5.7-6.4%  Consistent with prediabetes  >or=6.5%  Consistent with diabetes    High levels of fetal hemoglobin interfere with the HbA1C  assay. Heterozygous hemoglobin variants (HbS, HgC, etc)do  not significantly interfere with this assay.   However, presence of multiple variants may affect accuracy.     10/10/2023 7.0 (H) 4.0 - 5.6 % Final     Comment:     ADA Screening Guidelines:  5.7-6.4%  Consistent with prediabetes  >or=6.5%  Consistent with diabetes    High levels of fetal hemoglobin interfere with the HbA1C  assay. Heterozygous hemoglobin variants (HbS, HgC, etc)do  not significantly interfere with this assay.   However, presence of multiple variants may affect accuracy.     06/26/2023 7.2 (H) 4.0 - 5.6 % Final     Comment:     ADA Screening Guidelines:  5.7-6.4%  Consistent with prediabetes  >or=6.5%  Consistent with diabetes    High levels of fetal hemoglobin interfere with the HbA1C  assay. Heterozygous hemoglobin variants  (HbS, HgC, etc)do  not significantly interfere with this assay.   However, presence of multiple variants may affect accuracy.          Chemistry        Component Value Date/Time     02/19/2024 0935    K 4.0 02/19/2024 0935     02/19/2024 0935    CO2 23 02/19/2024 0935    BUN 11 02/19/2024 0935    CREATININE 0.8 02/19/2024 0935     (H) 02/19/2024 0935        Component Value Date/Time    CALCIUM 9.5 02/19/2024 0935    ALKPHOS 59 10/17/2023 0855    AST 15 10/17/2023 0855    ALT 16 10/17/2023 0855    BILITOT 0.5 10/17/2023 0855    ESTGFRAFRICA >60.0 08/27/2021 1108    EGFRNONAA >60.0 08/27/2021 1108           Lab Results   Component Value Date    TSH 0.654 10/17/2023      Lab Results   Component Value Date    CHOL 96 (L) 03/11/2024    CHOL 96 (L) 10/17/2023    CHOL 89 (L) 08/15/2022     Lab Results   Component Value Date    HDL 35 (L) 03/11/2024    HDL 37 (L) 10/17/2023    HDL 32 (L) 08/15/2022     Lab Results   Component Value Date    LDLCALC 39.2 (L) 03/11/2024    LDLCALC 49.2 (L) 10/17/2023    LDLCALC 42.0 (L) 08/15/2022     Lab Results   Component Value Date    TRIG 109 03/11/2024    TRIG 49 10/17/2023    TRIG 75 08/15/2022     Lab Results   Component Value Date    CHOLHDL 36.5 03/11/2024    CHOLHDL 38.5 10/17/2023    CHOLHDL 36.0 08/15/2022         PHYSICAL EXAMINATION  Constitutional: Appears well, no distress Reviewed vitals above.  Eyes: conjunctivae & lids intact; PERRLA, EOMs intact; optic discs   Neck: Supple, trachea midline.   Respiratory: CTA without wheezes, even and unlabored, upon palpation wnl, percussion wnl  Cardiovascular: RRR; no edema or varicosities  Lymph: no lymphadenopathy palpated  Skin: warm and dry; no injection site reactions, no acanthosis nigracans observed.  Neuro:patient alert and cooperative, normal affect; steady gait.  Psychiatric: judgement & insight intact, orientation of time, place & person intact, memory; mood & affect wnl     Diabetes Foot Exam: deferred      Assessment/Plan    1. Type 2 diabetes mellitus without complication, without long-term current use of insulin  Hemoglobin A1C      2. Vitamin D deficiency        3. Morbid obesity with BMI of 45.0-49.9, adult        4. Family planning          Follow up in 4 months w /me , A1c prior , A1c goal less than 6.5%, stop nph and regular, change to lantus 40 units at nigth, humalog 12-12-12 units before meals scale 150-200+2, etc., starting dexcom g7, f/u with [place]  On supplement   Body mass index is 49.77 kg/m². May increase insulin resistance  F/u with ob/gyn     FOLLOW UP  In 4 months      Time: 30 mins

## 2024-07-12 ENCOUNTER — OFFICE VISIT (OUTPATIENT)
Dept: INTERNAL MEDICINE | Facility: CLINIC | Age: 39
End: 2024-07-12
Payer: COMMERCIAL

## 2024-07-12 VITALS
BODY MASS INDEX: 50.04 KG/M2 | DIASTOLIC BLOOD PRESSURE: 78 MMHG | SYSTOLIC BLOOD PRESSURE: 100 MMHG | WEIGHT: 254.88 LBS | HEART RATE: 70 BPM | OXYGEN SATURATION: 98 % | HEIGHT: 60 IN

## 2024-07-12 DIAGNOSIS — Z30.09 FAMILY PLANNING: ICD-10-CM

## 2024-07-12 DIAGNOSIS — E55.9 VITAMIN D DEFICIENCY: ICD-10-CM

## 2024-07-12 DIAGNOSIS — E11.9 TYPE 2 DIABETES MELLITUS WITHOUT COMPLICATION, WITHOUT LONG-TERM CURRENT USE OF INSULIN: Primary | ICD-10-CM

## 2024-07-12 DIAGNOSIS — E66.01 MORBID OBESITY WITH BMI OF 45.0-49.9, ADULT: ICD-10-CM

## 2024-07-12 PROCEDURE — 99999 PR PBB SHADOW E&M-EST. PATIENT-LVL III: CPT | Mod: PBBFAC,,, | Performed by: NURSE PRACTITIONER

## 2024-07-12 RX ORDER — PEN NEEDLE, DIABETIC 29 G X1/2"
NEEDLE, DISPOSABLE MISCELLANEOUS
Qty: 300 EACH | Refills: 3 | Status: SHIPPED | OUTPATIENT
Start: 2024-07-12

## 2024-07-12 RX ORDER — INSULIN LISPRO 100 [IU]/ML
INJECTION, SOLUTION INTRAVENOUS; SUBCUTANEOUS
Qty: 30 ML | Refills: 6 | Status: SHIPPED | OUTPATIENT
Start: 2024-07-12

## 2024-07-12 RX ORDER — INSULIN GLARGINE 100 [IU]/ML
INJECTION, SOLUTION SUBCUTANEOUS
Qty: 15 ML | Refills: 6 | Status: SHIPPED | OUTPATIENT
Start: 2024-07-12

## 2024-07-12 RX ORDER — PEN NEEDLE, DIABETIC 30 GX3/16"
NEEDLE, DISPOSABLE MISCELLANEOUS
Qty: 150 EACH | Refills: 11 | Status: SHIPPED | OUTPATIENT
Start: 2024-07-12

## 2024-07-12 NOTE — TELEPHONE ENCOUNTER
No care due was identified.  [facility] Embedded Care Due Messages. Reference number: [id number].   7/12/2024 9:10:57 AM CDT

## 2024-07-12 NOTE — PATIENT INSTRUCTIONS
Follow up in 4 months w/Irielle   A1c prior -4 months     Lab Results   Component Value Date    HGBA1C 6.6 (H) 02/19/2024     Goal less than 6.5%    [email or web address]  Eat fit marcin  My[employer]pal marcin  [email or web address]    my[employer]gr marcin      Goal  no higher than 180     Lantus 40 units at night   Stop nph   Start humalog 12-12-12 units before meals   Scale 150-200+2, etc.      
flank pain/CONSTIPATION

## 2024-07-12 NOTE — TELEPHONE ENCOUNTER
Refill Decision Note   Delmis Barajasford  is requesting a refill authorization.  Brief Assessment and Rationale for Refill:  Approve     Medication Therapy Plan:         Comments:     Note composed:12:24 PM 07/12/2024

## 2024-07-22 RX ORDER — INSULIN GLARGINE 100 [IU]/ML
INJECTION, SOLUTION SUBCUTANEOUS
Qty: 15 ML | Refills: 6 | Status: SHIPPED | OUTPATIENT
Start: 2024-07-22

## 2024-07-22 RX ORDER — INSULIN LISPRO 100 [IU]/ML
INJECTION, SOLUTION INTRAVENOUS; SUBCUTANEOUS
Qty: 30 ML | Refills: 6 | Status: SHIPPED | OUTPATIENT
Start: 2024-07-22

## 2024-07-22 RX ORDER — PEN NEEDLE, DIABETIC 30 GX3/16"
NEEDLE, DISPOSABLE MISCELLANEOUS
Qty: 150 EACH | Refills: 11 | Status: SHIPPED | OUTPATIENT
Start: 2024-07-22

## 2024-09-14 ENCOUNTER — ON-DEMAND VIRTUAL (OUTPATIENT)
Dept: URGENT CARE | Facility: CLINIC | Age: 39
End: 2024-09-14
Payer: COMMERCIAL

## 2024-09-14 DIAGNOSIS — H10.32 ACUTE BACTERIAL CONJUNCTIVITIS OF LEFT EYE: Primary | ICD-10-CM

## 2024-09-14 PROCEDURE — 99213 OFFICE O/P EST LOW 20 MIN: CPT | Mod: 95,,,

## 2024-09-14 RX ORDER — CIPROFLOXACIN HYDROCHLORIDE 3 MG/ML
1 SOLUTION/ DROPS OPHTHALMIC
Qty: 5 ML | Refills: 0 | Status: SHIPPED | OUTPATIENT
Start: 2024-09-14 | End: 2024-09-14

## 2024-09-14 RX ORDER — CIPROFLOXACIN HYDROCHLORIDE 3 MG/ML
1 SOLUTION/ DROPS OPHTHALMIC
Qty: 5 ML | Refills: 0 | Status: SHIPPED | OUTPATIENT
Start: 2024-09-14

## 2024-09-15 NOTE — PROGRESS NOTES
"Subjective:      Patient ID: Delmis Thrasher is a 39 y.o. female at home  Vitals:  vitals were not taken for this visit.     Chief Complaint: Eye Pain      Visit Type: TELE AUDIOVISUAL    Present with the patient at the time of consultation: TELEMED PRESENT WITH PATIENT: None    Past Medical History:   Diagnosis Date    Asthma     Herpes simplex virus (HSV) infection     History of gonorrhea     History of trichomoniasis 10/2018     Past Surgical History:   Procedure Laterality Date     SECTION N/A 2021    Procedure:  SECTION;  Surgeon: Joan Laureano MD;  Location: [facility] L&D;  Service: OB/GYN;  Laterality: N/A;     SECTION  7/3/2021    DILATION AND CURETTAGE OF UTERUS USING SUCTION N/A 2020    Procedure: DILATION AND CURETTAGE, UTERUS, USING SUCTION;  Surgeon: Sharri Orozco MD;  Location: [facility] OR;  Service: OB/GYN;  Laterality: N/A;    MYRINGOTOMY W/ TUBES       Review of patient's allergies indicates:   Allergen Reactions    Metformin Other (See Comments)     Abdominal pain       Current Outpatient Medications on File Prior to Visit   Medication Sig Dispense Refill    BD INSULIN SYRINGE ULTRA-FINE 0.3 mL 31 gauge x 5/16" Syrg USE  SYRINGE THREE TIMES DAILY 300 each 3    blood sugar diagnostic Strp 1 strip by Misc.(Non-Drug; Combo Route) route 3 (three) times daily. Please provide items covered by patient's insurance 100 strip 5    insulin glargine U-100, Lantus, (LANTUS SOLOSTAR U-100 INSULIN) 100 unit/mL (3 mL) InPn pen Inject 40 units at night. 15 mL 6    insulin lispro (HUMALOG KWIKPEN INSULIN) 100 unit/mL pen Inject 12 units before meals plus scale 150-200+2 ,201-250 +4, 251-300+6, 301-350+8, >350+10, max daily 66 units 30 mL 6    lancets Misc 1 lancet by Misc.(Non-Drug; Combo Route) route 2 (two) times daily. 100 each 5    pen needle, diabetic (PEN NEEDLE) 32 gauge x 5/32" Ndle Use 4 x a day 150 each 11     No current facility-administered " medications on file prior to visit.     Family History   Problem Relation Name Age of Onset    Thyroid disease Mother Mayr         thyroidectomy    Hypertension Mother Mary     Diabetes Father Mynor     Colon cancer Neg Hx      Breast cancer Neg Hx      Cancer Neg Hx      Ovarian cancer Neg Hx         Medications Ordered                [id number] [place] - [address] [facility]   [address] [place] [address]    Telephone: [phone]   Fax: [phone]   Hours: Not open 24 hours                         E-Prescribed (1 of 1)              ciprofloxacin HCl (CILOXAN) 0.3 % ophthalmic solution    Sig: Place 1 drop into the left eye every 2 (two) hours.       Start: 9/14/24     Quantity: 5 mL Refills: 0                           Ohs Peq Odvv Intake    9/14/2024  7:54 PM CDT - Filed by Patient   What is your current physical address in the event of a medical emergency? [address], [address]   Are you able to take your vital signs? No   Please attach any relevant images or files          Pt states that this morning she began to have redness in her left eye. Pt states that she took her contact out and has continued to have redness with drainage pt state that she believes that she has pink eye.         Eyes:  Positive for eye discharge, eye redness and photophobia.        Objective:   The physical exam was conducted virtually.  Physical Exam   Constitutional: She is oriented to person, place, and time.   HENT:   Head: Normocephalic and atraumatic.   Nose: Nose normal.   Eyes: Right eye exhibits discharge.   Pulmonary/Chest: Effort normal.   Abdominal: Normal appearance.   Musculoskeletal: Normal range of motion.         General: Normal range of motion.   Neurological: no focal deficit. She is alert, oriented to person, place, and time and at baseline.   Skin: Skin is warm.   Psychiatric: Her behavior is normal. Mood, judgment and thought content normal.       Assessment:     1. Acute  bacterial conjunctivitis of left eye        Plan:       Acute bacterial conjunctivitis of left eye  -     ciprofloxacin HCl (CILOXAN) 0.3 % ophthalmic solution; Place 1 drop into the left eye every 2 (two) hours.  Dispense: 5 mL; Refill: 0

## 2024-09-19 ENCOUNTER — PATIENT OUTREACH (OUTPATIENT)
Dept: ADMINISTRATIVE | Facility: HOSPITAL | Age: 39
End: 2024-09-19
Payer: COMMERCIAL

## 2024-09-19 DIAGNOSIS — Z12.31 ENCOUNTER FOR SCREENING MAMMOGRAM FOR MALIGNANT NEOPLASM OF BREAST: Primary | ICD-10-CM

## 2024-09-19 NOTE — PROGRESS NOTES
Population Health Chart Review & Patient Outreach Details      Additional [facility] Health Notes:               Updates Requested / Reviewed:      Care Everywhere, , and Immunizations Reconciliation Completed or Queried: [place]         Health Maintenance Topics Overdue:      [facility] Score: 1     Eye Exam                       Health Maintenance Topic(s) Outreach Outcomes & Actions Taken:    Primary Care Appt - Outreach Outcomes & Actions Taken  : Primary Care Appt Scheduled    Breast Cancer Screening - Outreach Outcomes & Actions Taken  : Mammogram Order Placed

## 2024-09-20 ENCOUNTER — OFFICE VISIT (OUTPATIENT)
Dept: INTERNAL MEDICINE | Facility: CLINIC | Age: 39
End: 2024-09-20
Payer: COMMERCIAL

## 2024-09-20 VITALS
DIASTOLIC BLOOD PRESSURE: 80 MMHG | TEMPERATURE: 97 F | RESPIRATION RATE: 16 BRPM | HEIGHT: 60 IN | SYSTOLIC BLOOD PRESSURE: 126 MMHG | WEIGHT: 257 LBS | OXYGEN SATURATION: 97 % | BODY MASS INDEX: 50.45 KG/M2 | HEART RATE: 80 BPM

## 2024-09-20 DIAGNOSIS — H10.10 ALLERGIC CONJUNCTIVITIS, UNSPECIFIED LATERALITY: ICD-10-CM

## 2024-09-20 DIAGNOSIS — R09.81 NASAL CONGESTION: ICD-10-CM

## 2024-09-20 DIAGNOSIS — E11.65 TYPE 2 DIABETES MELLITUS WITH HYPERGLYCEMIA, WITHOUT LONG-TERM CURRENT USE OF INSULIN: Primary | ICD-10-CM

## 2024-09-20 PROCEDURE — 3074F SYST BP LT 130 MM HG: CPT | Mod: CPTII,S$GLB,, | Performed by: INTERNAL MEDICINE

## 2024-09-20 PROCEDURE — 1160F RVW MEDS BY RX/DR IN RCRD: CPT | Mod: CPTII,S$GLB,, | Performed by: INTERNAL MEDICINE

## 2024-09-20 PROCEDURE — 3061F NEG MICROALBUMINURIA REV: CPT | Mod: CPTII,S$GLB,, | Performed by: INTERNAL MEDICINE

## 2024-09-20 PROCEDURE — 99214 OFFICE O/P EST MOD 30 MIN: CPT | Mod: S$GLB,,, | Performed by: INTERNAL MEDICINE

## 2024-09-20 PROCEDURE — 3008F BODY MASS INDEX DOCD: CPT | Mod: CPTII,S$GLB,, | Performed by: INTERNAL MEDICINE

## 2024-09-20 PROCEDURE — 3051F HG A1C>EQUAL 7.0%<8.0%: CPT | Mod: CPTII,S$GLB,, | Performed by: INTERNAL MEDICINE

## 2024-09-20 PROCEDURE — 99999 PR PBB SHADOW E&M-EST. PATIENT-LVL III: CPT | Mod: PBBFAC,,, | Performed by: INTERNAL MEDICINE

## 2024-09-20 PROCEDURE — 3066F NEPHROPATHY DOC TX: CPT | Mod: CPTII,S$GLB,, | Performed by: INTERNAL MEDICINE

## 2024-09-20 PROCEDURE — 3079F DIAST BP 80-89 MM HG: CPT | Mod: CPTII,S$GLB,, | Performed by: INTERNAL MEDICINE

## 2024-09-20 PROCEDURE — 1159F MED LIST DOCD IN RCRD: CPT | Mod: CPTII,S$GLB,, | Performed by: INTERNAL MEDICINE

## 2024-09-20 RX ORDER — SOFT LENS ADJUNCTIVE SOLUTIONS
1 DROPS OPHTHALMIC (EYE) 3 TIMES DAILY
COMMUNITY
Start: 2024-09-20 | End: 2024-09-27

## 2024-09-20 RX ORDER — FLUTICASONE PROPIONATE 50 MCG
1 SPRAY, SUSPENSION (ML) NASAL DAILY
Start: 2024-09-20

## 2024-09-20 RX ORDER — LEVOCETIRIZINE DIHYDROCHLORIDE 5 MG/1
5 TABLET, FILM COATED ORAL NIGHTLY
Qty: 30 TABLET | Refills: 0 | Status: SHIPPED | OUTPATIENT
Start: 2024-09-20

## 2024-09-20 RX ORDER — AZITHROMYCIN 250 MG/1
TABLET, FILM COATED ORAL
Qty: 6 TABLET | Refills: 0 | Status: SHIPPED | OUTPATIENT
Start: 2024-09-20 | End: 2024-09-25

## 2024-09-20 NOTE — PROGRESS NOTES
Subjective:     Delmis Thrasher is a 39 y.o. female who presents for   Chief Complaint   Patient presents with    eye irritation     Burns and han a lot.  Wears contacts but hasn't worn since Saturday.  Got drops at virtual visit but hasn't helped much.  Feels like sand or peples in eye lid .  Didn't try saline eye drops.  Left eye puffy, almost closed in am but no mucus coming out. Of it. .     nasal congeestion     Started after eye symptoms.  Yesterday left side of face was puffy.    0 pain.      Diabetes       HPI      Diabetes: Patient presents for follow up of diabetes. Current symptoms include: none. Symptoms have been well-controlled. Patient denies foot ulcerations. Evaluation to date has included: hemoglobin A1C.  Home sugars: BGs consistently in an acceptable range. Current treatment:  Lantus, Humalog . Last dilated eye exam: due.    AM   Humalog 12 U ac tid and Lantus 40 units nightly    Lab Results   Component Value Date    HGBA1C 7.6 (H) 11/13/2024     (H) 10/22/2024       Nasal Congestion: The patient presents for evaluation and treatment of rhinitis and nasal congestion . Symptoms include: clear rhinorrhea, itchy eyes, nasal congestion, postnasal drip, and watery eyes . Treatment currently includes  nothing . She denies any sick contacts or travel. No fever, no chills, no SOB/      Eye Irritation: The patient presents for evaluation of foreign body sensation, itching, and watery discharge  in the left eye. She has noticed the above symptoms for  1-2 weeks and she had a virtual evaluation on 9/14/24.   Patient denies blurred vision, pain, and visual field deficit. There is a history of contact lens use.        Review of Systems   Constitutional:  Negative for chills, diaphoresis, fatigue and fever.   HENT:  Negative for congestion, ear pain, rhinorrhea, sinus pressure and sore throat.    Eyes:  Positive for discharge (watery) and itching. Negative for photophobia and  redness.   Respiratory:  Negative for cough and shortness of breath.    Cardiovascular:  Negative for chest pain and palpitations.   Gastrointestinal:  Negative for abdominal pain, constipation, diarrhea, nausea and vomiting.   Endocrine: Negative for polydipsia and polyuria.   Musculoskeletal:  Negative for arthralgias and myalgias.   Skin:  Negative for rash and wound.   Neurological:  Negative for dizziness, tremors and headaches.          Objective:     Physical Exam  Vitals reviewed.   Constitutional:       General: She is awake. She is not in acute distress.     Appearance: Normal appearance. She is well-developed and well-groomed.   HENT:      Head: Normocephalic and atraumatic.      Right Ear: Hearing, ear canal and external ear normal. Tympanic membrane is not erythematous or bulging.      Left Ear: Hearing, tympanic membrane, ear canal and external ear normal. Tympanic membrane is not erythematous or bulging.      Nose: Nose normal. No congestion or rhinorrhea.      Mouth/Throat:      Mouth: Mucous membranes are moist.      Pharynx: No posterior oropharyngeal erythema.   Eyes:      General: Lids are normal. Vision grossly intact.         Left eye: Discharge (watery) present.No foreign body.      Extraocular Movements:      Left eye: Normal extraocular motion.      Comments: Mild left eye erythema affecting eyelid and sclera   Cardiovascular:      Rate and Rhythm: Normal rate and regular rhythm.      Heart sounds: Normal heart sounds. No murmur heard.  Pulmonary:      Effort: Pulmonary effort is normal.      Breath sounds: Normal breath sounds. No decreased breath sounds or wheezing.   Abdominal:      General: Bowel sounds are normal. There is no distension.   Musculoskeletal:         General: Normal range of motion.      Cervical back: Normal range of motion.      Right lower leg: No edema.      Left lower leg: No edema.   Skin:     General: Skin is warm and dry.      Findings: No lesion or rash.    Neurological:      Mental Status: She is alert and oriented to person, place, and time.   Psychiatric:         Attention and Perception: Attention normal.         Mood and Affect: Mood normal.         Behavior: Behavior is cooperative.            Assessment:      1. Type 2 diabetes mellitus with hyperglycemia, without long-term current use of insulin    2. Allergic conjunctivitis, unspecified laterality    3. Nasal congestion           Plan:     1. Type 2 diabetes mellitus with hyperglycemia, without long-term current use of insulin  - stable sees Endocrinology providers, better control of BG, monitor    2. Allergic conjunctivitis, unspecified laterality  - naphazoline-pheniramine 0.62476-6.315% (OPCON-A) 0.54836-4.315 % ophthalmic solution; Place 1 drop into both eyes 3 (three) times daily. for 7 days  - may use cold compresses as needed for discomfort    3. Nasal congestion  - could be viral vs allergy-related, treat with antibiotics due to duration  -  fluticasone propionate (FLONASE) 50 mcg/actuation nasal spray; 1 spray (50 mcg total) by Each Nostril route once daily. (Patient not taking: Reported on 12/9/2024)  - levocetirizine (XYZAL) 5 MG tablet; Take 1 tablet (5 mg total) by mouth every evening. (Patient not taking: Reported on 12/9/2024)  Dispense: 30 tablet; Refill: 0  - azithromycin (Z-SHANNON) 250 MG tablet; Take 2 tablets by mouth on day 1; Take 1 tablet by mouth on days 2-5  Dispense: 6 tablet; Refill: 0       Call if there is no improvement in symptoms    __________________________    Oliva Donnelly MD, PharmD  [facility]- [facility]  American Board of Obesity Medicine diplomate  Office [phone]

## 2024-10-21 ENCOUNTER — OFFICE VISIT (OUTPATIENT)
Dept: INTERNAL MEDICINE | Facility: CLINIC | Age: 39
End: 2024-10-21
Payer: COMMERCIAL

## 2024-10-21 VITALS
HEIGHT: 60 IN | OXYGEN SATURATION: 98 % | RESPIRATION RATE: 10 BRPM | WEIGHT: 261.25 LBS | SYSTOLIC BLOOD PRESSURE: 124 MMHG | HEART RATE: 61 BPM | BODY MASS INDEX: 51.29 KG/M2 | TEMPERATURE: 98 F | DIASTOLIC BLOOD PRESSURE: 84 MMHG

## 2024-10-21 DIAGNOSIS — E55.9 VITAMIN D INSUFFICIENCY: ICD-10-CM

## 2024-10-21 DIAGNOSIS — Z79.4 TYPE 2 DIABETES MELLITUS WITHOUT COMPLICATION, WITH LONG-TERM CURRENT USE OF INSULIN: ICD-10-CM

## 2024-10-21 DIAGNOSIS — E66.01 MORBID OBESITY WITH BODY MASS INDEX (BMI) OF 50.0 TO 59.9 IN ADULT: ICD-10-CM

## 2024-10-21 DIAGNOSIS — Z00.00 ANNUAL PHYSICAL EXAM: Primary | ICD-10-CM

## 2024-10-21 DIAGNOSIS — Z23 NEED FOR VACCINATION: ICD-10-CM

## 2024-10-21 DIAGNOSIS — E11.9 TYPE 2 DIABETES MELLITUS WITHOUT COMPLICATION, WITH LONG-TERM CURRENT USE OF INSULIN: ICD-10-CM

## 2024-10-21 PROCEDURE — 1160F RVW MEDS BY RX/DR IN RCRD: CPT | Mod: CPTII,S$GLB,, | Performed by: INTERNAL MEDICINE

## 2024-10-21 PROCEDURE — 3051F HG A1C>EQUAL 7.0%<8.0%: CPT | Mod: CPTII,S$GLB,, | Performed by: INTERNAL MEDICINE

## 2024-10-21 PROCEDURE — 1159F MED LIST DOCD IN RCRD: CPT | Mod: CPTII,S$GLB,, | Performed by: INTERNAL MEDICINE

## 2024-10-21 PROCEDURE — 99999 PR PBB SHADOW E&M-EST. PATIENT-LVL IV: CPT | Mod: PBBFAC,,, | Performed by: INTERNAL MEDICINE

## 2024-10-21 PROCEDURE — 3061F NEG MICROALBUMINURIA REV: CPT | Mod: CPTII,S$GLB,, | Performed by: INTERNAL MEDICINE

## 2024-10-21 PROCEDURE — 99396 PREV VISIT EST AGE 40-64: CPT | Mod: S$GLB,,, | Performed by: INTERNAL MEDICINE

## 2024-10-21 PROCEDURE — 3008F BODY MASS INDEX DOCD: CPT | Mod: CPTII,S$GLB,, | Performed by: INTERNAL MEDICINE

## 2024-10-21 PROCEDURE — 90656 IIV3 VACC NO PRSV 0.5 ML IM: CPT | Mod: S$GLB,,, | Performed by: INTERNAL MEDICINE

## 2024-10-21 PROCEDURE — 3074F SYST BP LT 130 MM HG: CPT | Mod: CPTII,S$GLB,, | Performed by: INTERNAL MEDICINE

## 2024-10-21 PROCEDURE — G0008 ADMIN INFLUENZA VIRUS VAC: HCPCS | Mod: S$GLB,,, | Performed by: INTERNAL MEDICINE

## 2024-10-21 PROCEDURE — 3066F NEPHROPATHY DOC TX: CPT | Mod: CPTII,S$GLB,, | Performed by: INTERNAL MEDICINE

## 2024-10-21 PROCEDURE — 3079F DIAST BP 80-89 MM HG: CPT | Mod: CPTII,S$GLB,, | Performed by: INTERNAL MEDICINE

## 2024-10-21 NOTE — PROGRESS NOTES
"    Subjective:     PCP: Oliva Donnelly MD    Delmis Thrasher is a 39 y.o. female who presents for an annual exam.    Medical History:   Past Medical History:   Diagnosis Date    Asthma     Herpes simplex virus (HSV) infection     History of gonorrhea     History of trichomoniasis 10/2018       Family History: family history includes Diabetes in her father; Hypertension in her mother; Thyroid disease in her mother.    Surgical History:   Past Surgical History:   Procedure Laterality Date     SECTION N/A 2021    Procedure:  SECTION;  Surgeon: Joan Laureano MD;  Location: [facility];  Service: OB/GYN;  Laterality: N/A;     SECTION  7/3/2021    DILATION AND CURETTAGE OF UTERUS USING SUCTION N/A 2020    Procedure: DILATION AND CURETTAGE, UTERUS, USING SUCTION;  Surgeon: Sharri Orozco MD;  Location: [facility];  Service: OB/GYN;  Laterality: N/A;    MYRINGOTOMY W/ TUBES          Social History:  reports that she has never smoked. She has never used smokeless tobacco. She reports current alcohol use of about 1.0 standard drink of alcohol per week. She reports that she does not use drugs.     Allergies:   Review of patient's allergies indicates:   Allergen Reactions    Metformin Other (See Comments)     Abdominal pain         Medications:   Current Outpatient Medications   Medication Sig    BD INSULIN SYRINGE ULTRA-FINE 0.3 mL 31 gauge x 5/16" Syrg USE  SYRINGE THREE TIMES DAILY    blood sugar diagnostic Strp 1 strip by Misc.(Non-Drug; Combo Route) route 3 (three) times daily. Please provide items covered by patient's insurance    fluticasone propionate (FLONASE) 50 mcg/actuation nasal spray 1 spray (50 mcg total) by Each Nostril route once daily. (Patient not taking: Reported on 2024)    insulin glargine U-100, Lantus, (LANTUS SOLOSTAR U-100 INSULIN) 100 unit/mL (3 mL) InPn pen Inject 40 units at night.    insulin lispro (HUMALOG KWIKPEN INSULIN) 100 " "unit/mL pen Inject 12 units before meals plus scale 150-200+2 ,201-250 +4, 251-300+6, 301-350+8, >350+10, max daily 66 units    lancets Misc 1 lancet by Misc.(Non-Drug; Combo Route) route 2 (two) times daily.    levocetirizine (XYZAL) 5 MG tablet Take 1 tablet (5 mg total) by mouth every evening. (Patient not taking: Reported on 12/9/2024)    pen needle, diabetic (PEN NEEDLE) 32 gauge x 5/32" Ndle Use 4 x a day    ciprofloxacin HCl (CILOXAN) 0.3 % ophthalmic solution Place 1 drop into the left eye every 2 (two) hours. (Patient not taking: Reported on 9/20/2024)     No current facility-administered medications for this visit.       Health Maintenance:   Health Maintenance Topics with due status: Not Due       Topic Last Completion Date    TETANUS VACCINE 11/25/2021    Cervical Cancer Screening 10/04/2022    Diabetes Urine Screening 10/22/2024    Lipid Panel 10/22/2024    Mammogram 11/01/2024    Hemoglobin A1c 11/13/2024    Eye Exam 12/09/2024    RSV Vaccine (Age 60+ and Pregnant patients) Not Due     Lab Results   Component Value Date    HEPCAB Negative 08/27/2021    [id number] Negative 06/23/2021     Eye Exam:   due, outside of [facility]  Dental Exam: due  OB/GYN: Nanette Arreola.   Pap smear: 10/11/2022  Mammogram: scheduled  HIV: 8/2021, neg  Hepatitis C  Ab: 8/2021, neg    Vaccinations:  Immunization History   Administered Date(s) Administered    COVID-19, MRNA, LN-S, PF (Pfizer) (Gray Cap) 04/12/2022    COVID-19, MRNA, LN-S, PF (Pfizer) (Purple Cap) 07/19/2021, 08/09/2021    Influenza 11/01/2020, 11/04/2021    Influenza - Quadrivalent - PF *Preferred* (6 months and older) 09/21/2023    Influenza - Trivalent - Fluarix, Flulaval, Fluzone, Afluria - PF 10/21/2024    Pneumococcal Conjugate - 20 Valent 03/24/2023    Tdap 07/10/2020, 06/10/2021, 11/25/2021     Influenza: due  Tetanus: 2021  Prevnar-20: 2023  Covid vaccine: boosted x2    Body mass index is 51.02 kg/m².  Wt Readings from Last 3 Encounters:   11/01/24 118.4 " kg (261 lb)   10/21/24 118.5 kg (261 lb 3.9 oz)   09/20/24 116.6 kg (257 lb)       Review of Systems   Constitutional:  Negative for chills, diaphoresis, fatigue and fever.   HENT:  Negative for congestion, dental problem, ear discharge, ear pain, postnasal drip, rhinorrhea, sinus pressure, sore throat and trouble swallowing.    Eyes:  Negative for redness and visual disturbance.   Respiratory:  Negative for cough, chest tightness and shortness of breath.    Cardiovascular:  Negative for chest pain, palpitations and leg swelling.   Gastrointestinal:  Negative for abdominal pain, blood in stool, constipation, diarrhea, nausea and vomiting.   Endocrine: Negative for polydipsia and polyuria.   Genitourinary:  Negative for decreased urine volume, dysuria, frequency and hematuria.   Musculoskeletal:  Negative for arthralgias, back pain and myalgias.   Skin:  Negative for rash and wound.   Neurological:  Negative for dizziness, weakness, numbness and headaches.   Hematological:  Negative for adenopathy.   Psychiatric/Behavioral:  Negative for dysphoric mood and sleep disturbance. The patient is not nervous/anxious.           Objective:     Physical Exam  Vitals reviewed.   Constitutional:       General: She is awake. She is not in acute distress.     Appearance: Normal appearance. She is well-developed and well-groomed. She is not diaphoretic.   HENT:      Head: Normocephalic and atraumatic.      Right Ear: Hearing, tympanic membrane, ear canal and external ear normal. Tympanic membrane is not erythematous or bulging.      Left Ear: Hearing, tympanic membrane, ear canal and external ear normal. Tympanic membrane is not erythematous or bulging.      Nose: Nose normal. No congestion.      Mouth/Throat:      Mouth: Mucous membranes are moist.      Tongue: No lesions.      Pharynx: Oropharynx is clear. Uvula midline. No oropharyngeal exudate or posterior oropharyngeal erythema.   Eyes:      General: Lids are normal. Vision  grossly intact. Gaze aligned appropriately. No scleral icterus.     Conjunctiva/sclera:      Right eye: Right conjunctiva is not injected.      Left eye: Left conjunctiva is not injected.      Pupils: Pupils are equal, round, and reactive to light.   Neck:      Thyroid: No thyroid mass or thyromegaly.   Cardiovascular:      Rate and Rhythm: Normal rate and regular rhythm.      Pulses: Normal pulses.      Heart sounds: Normal heart sounds. No murmur heard.  Pulmonary:      Effort: Pulmonary effort is normal. No respiratory distress.      Breath sounds: Normal breath sounds. No decreased breath sounds or wheezing.   Abdominal:      General: Bowel sounds are normal. There is no distension.      Palpations: Abdomen is soft. Abdomen is not rigid.      Tenderness: There is no abdominal tenderness. There is no guarding or rebound.   Musculoskeletal:         General: Normal range of motion.      Cervical back: Normal range of motion and neck supple.      Right lower leg: No edema.      Left lower leg: No edema.   Lymphadenopathy:      Cervical: No cervical adenopathy.      Upper Body:      Right upper body: No supraclavicular adenopathy.      Left upper body: No supraclavicular adenopathy.   Skin:     General: Skin is warm and dry.      Coloration: Skin is not cyanotic.      Findings: No lesion or rash.      Nails: There is no clubbing.   Neurological:      General: No focal deficit present.      Mental Status: She is alert and oriented to person, place, and time.      Sensory: Sensation is intact.      Coordination: Coordination is intact.      Gait: Gait is intact.      Deep Tendon Reflexes: Reflexes are normal and symmetric.   Psychiatric:         Attention and Perception: Attention normal.         Mood and Affect: Mood normal.         Behavior: Behavior is cooperative.              Assessment:        1. Annual physical exam    2. Type 2 diabetes mellitus without complication, with long-term current use of insulin    3.  Vitamin D insufficiency    4. Morbid obesity with body mass index (BMI) of 50.0 to 59.9 in adult    5. Need for vaccination           Plan:     1. Annual physical exam  - ordered cbc, cmp, tsh, hba1c, ua    2. Type 2 diabetes mellitus without complication, with long-term current use of insulin  - has been controlled, check HbA1c  - started insulin while preparing for pregnancy  - continue Lantus + Humalog, f/u with Endocrinology regularly    3. Vitamin D insufficiency  - Vitamin D; Future    4. Morbid obesity with body mass index (BMI) of 50.0 to 59.9 in adult  - increase physical activity, minimize fats and sugars, avoid fast food    5. Need for vaccination  - Influenza - Trivalent - PF (ADULT)      RTC in 4 months for follow-up or sooner if needed    __________________________    Oliva Donnelly MD, PharmD  [facility]  American Board of Obesity Medicine diplomate  Office [phone]

## 2024-10-22 ENCOUNTER — LAB VISIT (OUTPATIENT)
Dept: LAB | Facility: HOSPITAL | Age: 39
End: 2024-10-22
Attending: INTERNAL MEDICINE
Payer: COMMERCIAL

## 2024-10-22 DIAGNOSIS — Z00.00 ANNUAL PHYSICAL EXAM: ICD-10-CM

## 2024-10-22 DIAGNOSIS — N97.0 ANOVULATION: ICD-10-CM

## 2024-10-22 DIAGNOSIS — E11.9 TYPE 2 DIABETES MELLITUS WITHOUT COMPLICATION, WITHOUT LONG-TERM CURRENT USE OF INSULIN: ICD-10-CM

## 2024-10-22 LAB
25(OH)D3+25(OH)D2 SERPL-MCNC: 29 NG/ML (ref 30–96)
ALBUMIN SERPL BCP-MCNC: 3.8 G/DL (ref 3.5–5.2)
ALP SERPL-CCNC: 57 U/L (ref 40–150)
ALT SERPL W/O P-5'-P-CCNC: 15 U/L (ref 10–44)
ANION GAP SERPL CALC-SCNC: 7 MMOL/L (ref 8–16)
AST SERPL-CCNC: 12 U/L (ref 10–40)
BASOPHILS # BLD AUTO: 0.05 K/UL (ref 0–0.2)
BASOPHILS NFR BLD: 0.7 % (ref 0–1.9)
BILIRUB SERPL-MCNC: 0.6 MG/DL (ref 0.1–1)
BUN SERPL-MCNC: 12 MG/DL (ref 6–20)
CALCIUM SERPL-MCNC: 9.1 MG/DL (ref 8.7–10.5)
CHLORIDE SERPL-SCNC: 107 MMOL/L (ref 95–110)
CHOLEST SERPL-MCNC: 105 MG/DL (ref 120–199)
CHOLEST/HDLC SERPL: 2.8 {RATIO} (ref 2–5)
CO2 SERPL-SCNC: 25 MMOL/L (ref 23–29)
CREAT SERPL-MCNC: 0.7 MG/DL (ref 0.5–1.4)
DIFFERENTIAL METHOD BLD: NORMAL
EOSINOPHIL # BLD AUTO: 0.2 K/UL (ref 0–0.5)
EOSINOPHIL NFR BLD: 2.7 % (ref 0–8)
ERYTHROCYTE [DISTWIDTH] IN BLOOD BY AUTOMATED COUNT: 13 % (ref 11.5–14.5)
EST. GFR  (NO RACE VARIABLE): >60 ML/MIN/1.73 M^2
ESTIMATED AVG GLUCOSE: 171 MG/DL (ref 68–131)
GLUCOSE SERPL-MCNC: 165 MG/DL (ref 70–110)
HBA1C MFR BLD: 7.6 % (ref 4–5.6)
HCT VFR BLD AUTO: 43.2 % (ref 37–48.5)
HDLC SERPL-MCNC: 37 MG/DL (ref 40–75)
HDLC SERPL: 35.2 % (ref 20–50)
HGB BLD-MCNC: 14 G/DL (ref 12–16)
IMM GRANULOCYTES # BLD AUTO: 0.02 K/UL (ref 0–0.04)
IMM GRANULOCYTES NFR BLD AUTO: 0.3 % (ref 0–0.5)
LDLC SERPL CALC-MCNC: 57.4 MG/DL (ref 63–159)
LYMPHOCYTES # BLD AUTO: 2.2 K/UL (ref 1–4.8)
LYMPHOCYTES NFR BLD: 29.3 % (ref 18–48)
MCH RBC QN AUTO: 29.6 PG (ref 27–31)
MCHC RBC AUTO-ENTMCNC: 32.4 G/DL (ref 32–36)
MCV RBC AUTO: 91 FL (ref 82–98)
MONOCYTES # BLD AUTO: 0.4 K/UL (ref 0.3–1)
MONOCYTES NFR BLD: 5.1 % (ref 4–15)
NEUTROPHILS # BLD AUTO: 4.6 K/UL (ref 1.8–7.7)
NEUTROPHILS NFR BLD: 61.9 % (ref 38–73)
NONHDLC SERPL-MCNC: 68 MG/DL
NRBC BLD-RTO: 0 /100 WBC
PLATELET # BLD AUTO: 286 K/UL (ref 150–450)
PMV BLD AUTO: 10.4 FL (ref 9.2–12.9)
POTASSIUM SERPL-SCNC: 4.3 MMOL/L (ref 3.5–5.1)
PROGEST SERPL-MCNC: 0.1 NG/ML
PROT SERPL-MCNC: 7.2 G/DL (ref 6–8.4)
RBC # BLD AUTO: 4.73 M/UL (ref 4–5.4)
SODIUM SERPL-SCNC: 139 MMOL/L (ref 136–145)
TRIGL SERPL-MCNC: 53 MG/DL (ref 30–150)
TSH SERPL DL<=0.005 MIU/L-ACNC: 0.95 UIU/ML (ref 0.4–4)
WBC # BLD AUTO: 7.48 K/UL (ref 3.9–12.7)

## 2024-10-22 PROCEDURE — 82306 VITAMIN D 25 HYDROXY: CPT | Performed by: INTERNAL MEDICINE

## 2024-10-22 PROCEDURE — 80061 LIPID PANEL: CPT | Performed by: INTERNAL MEDICINE

## 2024-10-22 PROCEDURE — 85025 COMPLETE CBC W/AUTO DIFF WBC: CPT | Performed by: INTERNAL MEDICINE

## 2024-10-22 PROCEDURE — 84144 ASSAY OF PROGESTERONE: CPT | Performed by: OBSTETRICS & GYNECOLOGY

## 2024-10-22 PROCEDURE — 36415 COLL VENOUS BLD VENIPUNCTURE: CPT | Mod: PO | Performed by: INTERNAL MEDICINE

## 2024-10-22 PROCEDURE — 80053 COMPREHEN METABOLIC PANEL: CPT | Performed by: INTERNAL MEDICINE

## 2024-10-22 PROCEDURE — 83036 HEMOGLOBIN GLYCOSYLATED A1C: CPT | Performed by: INTERNAL MEDICINE

## 2024-10-22 PROCEDURE — 84443 ASSAY THYROID STIM HORMONE: CPT | Performed by: INTERNAL MEDICINE

## 2024-11-01 ENCOUNTER — HOSPITAL ENCOUNTER (OUTPATIENT)
Dept: RADIOLOGY | Facility: HOSPITAL | Age: 39
Discharge: HOME | End: 2024-11-01
Attending: INTERNAL MEDICINE
Payer: COMMERCIAL

## 2024-11-01 VITALS — WEIGHT: 261 LBS | BODY MASS INDEX: 51.24 KG/M2 | HEIGHT: 60 IN

## 2024-11-01 DIAGNOSIS — Z12.31 ENCOUNTER FOR SCREENING MAMMOGRAM FOR MALIGNANT NEOPLASM OF BREAST: ICD-10-CM

## 2024-11-01 PROCEDURE — 77067 SCR MAMMO BI INCL CAD: CPT | Mod: TC

## 2024-11-13 ENCOUNTER — LAB VISIT (OUTPATIENT)
Dept: LAB | Facility: HOSPITAL | Age: 39
End: 2024-11-13
Attending: NURSE PRACTITIONER
Payer: COMMERCIAL

## 2024-11-13 DIAGNOSIS — E11.9 TYPE 2 DIABETES MELLITUS WITHOUT COMPLICATION, WITHOUT LONG-TERM CURRENT USE OF INSULIN: ICD-10-CM

## 2024-11-13 LAB
ESTIMATED AVG GLUCOSE: 171 MG/DL (ref 68–131)
HBA1C MFR BLD: 7.6 % (ref 4–5.6)

## 2024-11-13 PROCEDURE — 83036 HEMOGLOBIN GLYCOSYLATED A1C: CPT | Performed by: NURSE PRACTITIONER

## 2024-11-13 PROCEDURE — 36415 COLL VENOUS BLD VENIPUNCTURE: CPT | Performed by: NURSE PRACTITIONER

## 2024-11-15 PROBLEM — E11.65 TYPE 2 DIABETES MELLITUS WITH HYPERGLYCEMIA, WITHOUT LONG-TERM CURRENT USE OF INSULIN: Status: ACTIVE | Noted: 2022-04-25

## 2024-11-22 ENCOUNTER — PATIENT MESSAGE (OUTPATIENT)
Dept: INTERNAL MEDICINE | Facility: CLINIC | Age: 39
End: 2024-11-22
Payer: COMMERCIAL

## 2024-12-05 ENCOUNTER — PATIENT MESSAGE (OUTPATIENT)
Dept: INTERNAL MEDICINE | Facility: CLINIC | Age: 39
End: 2024-12-05
Payer: COMMERCIAL

## 2024-12-09 ENCOUNTER — OFFICE VISIT (OUTPATIENT)
Dept: OPTOMETRY | Facility: CLINIC | Age: 39
End: 2024-12-09
Payer: COMMERCIAL

## 2024-12-09 DIAGNOSIS — Z01.00 ROUTINE EYE EXAM: Primary | ICD-10-CM

## 2024-12-09 DIAGNOSIS — H52.11 MYOPIA OF RIGHT EYE WITH ASTIGMATISM: ICD-10-CM

## 2024-12-09 DIAGNOSIS — H52.201 MYOPIA OF RIGHT EYE WITH ASTIGMATISM: ICD-10-CM

## 2024-12-09 DIAGNOSIS — H52.12 MYOPIA OF LEFT EYE: ICD-10-CM

## 2024-12-09 DIAGNOSIS — Z97.3 WEARS CONTACT LENSES: ICD-10-CM

## 2024-12-09 DIAGNOSIS — E11.9 TYPE 2 DIABETES MELLITUS WITHOUT RETINOPATHY: ICD-10-CM

## 2024-12-09 DIAGNOSIS — Z46.0 FITTING AND ADJUSTMENT OF SPECTACLES AND CONTACT LENSES: Primary | ICD-10-CM

## 2024-12-09 PROCEDURE — 2023F DILAT RTA XM W/O RTNOPTHY: CPT | Mod: CPTII,S$GLB,, | Performed by: OPTOMETRIST

## 2024-12-09 PROCEDURE — 3051F HG A1C>EQUAL 7.0%<8.0%: CPT | Mod: CPTII,S$GLB,, | Performed by: OPTOMETRIST

## 2024-12-09 PROCEDURE — 99999 PR PBB SHADOW E&M-EST. PATIENT-LVL II: CPT | Mod: PBBFAC,,, | Performed by: OPTOMETRIST

## 2024-12-09 PROCEDURE — 92310 CONTACT LENS FITTING OU: CPT | Mod: CSM,,, | Performed by: OPTOMETRIST

## 2024-12-09 PROCEDURE — 99499 UNLISTED E&M SERVICE: CPT | Mod: ,,, | Performed by: OPTOMETRIST

## 2024-12-09 PROCEDURE — 1159F MED LIST DOCD IN RCRD: CPT | Mod: CPTII,S$GLB,, | Performed by: OPTOMETRIST

## 2024-12-09 PROCEDURE — 99999 PR PBB SHADOW E&M-EST. PATIENT-LVL I: CPT | Mod: PBBFAC,,, | Performed by: OPTOMETRIST

## 2024-12-09 PROCEDURE — 92015 DETERMINE REFRACTIVE STATE: CPT | Mod: S$GLB,,, | Performed by: OPTOMETRIST

## 2024-12-09 PROCEDURE — 3066F NEPHROPATHY DOC TX: CPT | Mod: CPTII,S$GLB,, | Performed by: OPTOMETRIST

## 2024-12-09 PROCEDURE — 92004 COMPRE OPH EXAM NEW PT 1/>: CPT | Mod: S$GLB,,, | Performed by: OPTOMETRIST

## 2024-12-09 PROCEDURE — 3061F NEG MICROALBUMINURIA REV: CPT | Mod: CPTII,S$GLB,, | Performed by: OPTOMETRIST

## 2024-12-09 NOTE — PROGRESS NOTES
HPI    CC: VA OU blurry     RADHA:  ? 1 1/2 year    (-) Changes in vision   (-) Pain  (-) Irritation   (-) Itching   (-) Flashes  (-) Floaters  (+) Glasses wearer  (+) CL wearer, Acuvue Oasys                          OD: -4.50 -2.25 180                         OS: -4.50-0.75 180                                       (+) Uses eye gtts rewetting gtts    Does patient want a refraction today? yes    (-) Eye injury  (-) Eye surgery   (-)POHx  (-)FOHx    (+)DM  Hemoglobin A1C       Date                     Value               Ref Range             Status                11/13/2024               7.6 (H)             4.0 - 5.6 %           Final                   10/22/2024               7.6 (H)             4.0 - 5.6 %           Final                   02/19/2024               6.6 (H)             4.0 - 5.6 %           Final                   Last edited by Briana Moses, OD on 12/9/2024  8:36 AM.            Assessment /Plan     For exam results, see Encounter Report.    Routine eye exam    Myopia of right eye with astigmatism    Myopia of left eye    Type 2 diabetes mellitus without retinopathy      Spectera Exam.     2-3. Updated SRx. Monitor yearly.    Ordered CL trials. Pt to  from CL room once trials arrive. If pt is happy with comfort and vision of trials, she is to send a portal message. Monitor.     4. No retinopathy noted, OU. Continue proper BS control and annual diabetic eye exams. Monitor yearly.        RTC in 1 year for annual eye exam or sooner if needed.

## 2024-12-18 ENCOUNTER — PATIENT MESSAGE (OUTPATIENT)
Dept: OPTOMETRY | Facility: CLINIC | Age: 39
End: 2024-12-18
Payer: COMMERCIAL

## 2024-12-30 PROBLEM — Z79.4 TYPE 2 DIABETES MELLITUS WITHOUT COMPLICATION, WITH LONG-TERM CURRENT USE OF INSULIN: Status: ACTIVE | Noted: 2024-12-30

## 2024-12-30 PROBLEM — E11.9 TYPE 2 DIABETES MELLITUS WITHOUT COMPLICATION, WITH LONG-TERM CURRENT USE OF INSULIN: Status: ACTIVE | Noted: 2024-12-30

## 2025-01-20 ENCOUNTER — PATIENT MESSAGE (OUTPATIENT)
Dept: INTERNAL MEDICINE | Facility: CLINIC | Age: 40
End: 2025-01-20
Payer: COMMERCIAL

## 2025-01-29 ENCOUNTER — TELEPHONE (OUTPATIENT)
Dept: INTERNAL MEDICINE | Facility: CLINIC | Age: 40
End: 2025-01-29
Payer: COMMERCIAL

## 2025-01-30 ENCOUNTER — TELEPHONE (OUTPATIENT)
Dept: INTERNAL MEDICINE | Facility: CLINIC | Age: 40
End: 2025-01-30
Payer: COMMERCIAL

## 2025-01-30 NOTE — TELEPHONE ENCOUNTER
Called and spoke to pt on the phone -- rescheduled pts canceled appt due to weather for March 10th at 4:30

## 2025-02-21 ENCOUNTER — OFFICE VISIT (OUTPATIENT)
Dept: INTERNAL MEDICINE | Facility: CLINIC | Age: 40
End: 2025-02-21
Payer: COMMERCIAL

## 2025-02-21 VITALS
SYSTOLIC BLOOD PRESSURE: 112 MMHG | RESPIRATION RATE: 18 BRPM | BODY MASS INDEX: 51.38 KG/M2 | DIASTOLIC BLOOD PRESSURE: 78 MMHG | TEMPERATURE: 98 F | HEART RATE: 81 BPM | HEIGHT: 60 IN | WEIGHT: 261.69 LBS | OXYGEN SATURATION: 98 %

## 2025-02-21 DIAGNOSIS — E11.65 TYPE 2 DIABETES MELLITUS WITH HYPERGLYCEMIA, WITHOUT LONG-TERM CURRENT USE OF INSULIN: Primary | ICD-10-CM

## 2025-02-21 DIAGNOSIS — E66.01 MORBID OBESITY WITH BMI OF 50.0-59.9, ADULT: ICD-10-CM

## 2025-02-21 DIAGNOSIS — J02.9 SORE THROAT: ICD-10-CM

## 2025-02-21 DIAGNOSIS — R09.81 NASAL CONGESTION: ICD-10-CM

## 2025-02-21 PROCEDURE — 0241U SARS-COV2 (COVID) WITH FLU/RSV BY PCR: CPT | Performed by: INTERNAL MEDICINE

## 2025-02-21 PROCEDURE — 1160F RVW MEDS BY RX/DR IN RCRD: CPT | Mod: CPTII,S$GLB,, | Performed by: INTERNAL MEDICINE

## 2025-02-21 PROCEDURE — 3078F DIAST BP <80 MM HG: CPT | Mod: CPTII,S$GLB,, | Performed by: INTERNAL MEDICINE

## 2025-02-21 PROCEDURE — 99999 PR PBB SHADOW E&M-EST. PATIENT-LVL IV: CPT | Mod: PBBFAC,,, | Performed by: INTERNAL MEDICINE

## 2025-02-21 PROCEDURE — 3008F BODY MASS INDEX DOCD: CPT | Mod: CPTII,S$GLB,, | Performed by: INTERNAL MEDICINE

## 2025-02-21 PROCEDURE — 1159F MED LIST DOCD IN RCRD: CPT | Mod: CPTII,S$GLB,, | Performed by: INTERNAL MEDICINE

## 2025-02-21 PROCEDURE — 3074F SYST BP LT 130 MM HG: CPT | Mod: CPTII,S$GLB,, | Performed by: INTERNAL MEDICINE

## 2025-02-21 PROCEDURE — 99214 OFFICE O/P EST MOD 30 MIN: CPT | Mod: S$GLB,,, | Performed by: INTERNAL MEDICINE

## 2025-02-21 PROCEDURE — 87651 STREP A DNA AMP PROBE: CPT | Mod: PO | Performed by: INTERNAL MEDICINE

## 2025-02-21 NOTE — PROGRESS NOTES
"    Subjective:     Delmis Thrasher is a 39 y.o. female who presents for   Chief Complaint   Patient presents with    Follow-up       HPI    Eats pasta, wheat tortilla bread and wrap  Protein shakes  Dinner is hard BF greek yog straw shake mid mporn, chicken breast bites     after BF  180's or lower after lunch  BF after dinner 250's  Always +2 or 4    Diabetes: Patient presents {diabetes rfv:50148::"for follow up of diabetes."} Current symptoms include: {dm sx:22665}. Symptoms have {symptom progression:19445}. Patient denies {dm sx:19199}. Evaluation to date has included: {dm labs:8100898552}.  Home sugars: {dm home sugars:85706}. Current treatment: {dm interventions:23720}. Last dilated eye exam: ***.    Lab Results   Component Value Date    HGBA1C 7.6 (H) 11/13/2024     (H) 10/22/2024         2x weekly regularly   Add wts if possible, brantetti  30-45 min  Egg     Review of Systems       Objective:     Physical Exam       Assessment:      No diagnosis found.       Plan:     There are no diagnoses linked to this encounter.    Assessment & Plan                Call if there is no improvement in symptoms    __________________________    Oliva Donnelly MD, PharmD  [facility]  American Board of Obesity Medicine diplomate  Office [phone]        "   Gastrointestinal:  Negative for abdominal pain, constipation, diarrhea, nausea and vomiting.   Endocrine: Negative for polydipsia and polyuria.   Musculoskeletal:  Negative for arthralgias and myalgias.   Skin:  Negative for rash and wound.   Neurological:  Negative for dizziness, tremors and headaches.   Psychiatric/Behavioral:  Negative for dysphoric mood. The patient is not nervous/anxious.           Objective:     Physical Exam  Vitals reviewed.   Constitutional:       General: She is awake. She is not in acute distress.     Appearance: Normal appearance. She is well-developed and well-groomed.   HENT:      Head: Normocephalic and atraumatic.      Right Ear: Hearing, tympanic membrane, ear canal and external ear normal.      Left Ear: Hearing, tympanic membrane, ear canal and external ear normal.      Nose: Rhinorrhea present.      Right Turbinates: Swollen.      Left Turbinates: Swollen.      Right Sinus: No maxillary sinus tenderness or frontal sinus tenderness.      Left Sinus: No maxillary sinus tenderness or frontal sinus tenderness.      Mouth/Throat:      Mouth: Mucous membranes are moist.      Pharynx: Posterior oropharyngeal erythema present. No oropharyngeal exudate.   Eyes:      General: Lids are normal. Vision grossly intact.   Cardiovascular:      Rate and Rhythm: Normal rate and regular rhythm.      Heart sounds: Normal heart sounds. No murmur heard.  Pulmonary:      Effort: Pulmonary effort is normal.      Breath sounds: Normal breath sounds. No decreased breath sounds or wheezing.   Abdominal:      General: Bowel sounds are normal. There is no distension.   Musculoskeletal:         General: Normal range of motion.      Cervical back: Normal range of motion.      Right lower leg: No edema.      Left lower leg: No edema.   Skin:     General: Skin is warm and dry.      Findings: No lesion or rash.   Neurological:      Mental Status: She is alert and oriented to person, place, and time.    Psychiatric:         Attention and Perception: Attention normal.         Mood and Affect: Mood normal.         Behavior: Behavior is cooperative.            Assessment:      1. Type 2 diabetes mellitus with hyperglycemia, without long-term current use of insulin    2. Sore throat    3. Nasal congestion    4. Morbid obesity with BMI of 50.0-59.9, adult           Plan:     1. Type 2 diabetes mellitus with hyperglycemia, without long-term current use of insulin  - planning to see an Endocrinology NP for follow-up, adjusting regimen  - last HbA1c was 7.6    2,3. Sore throat/ Nasal congestion  - SARS-Cov2 (COVID) with FLU/RSV by PCR  - manage symptoms with OTC topical analgesic spray or tylenol, may use intranasal steroid spray and non-sedating antihistamine as needed    4. Morbid obesity with BMI of 50.0-59.9, adult  - work on increasing physical activity with any available time,  monitor weight off semaglutide    RTC in 4 months for follow-up or sooner if needed    __________________________    Oliva Donnelly MD, PharmD  [facility]  American Board of Obesity Medicine diplomate  Office [phone]

## 2025-02-22 LAB
INFLUENZA A, MOLECULAR: NOT DETECTED
INFLUENZA B, MOLECULAR: NOT DETECTED
RSV AG BY MOLECULAR METHOD: NOT DETECTED
SARS-COV-2 RNA RESP QL NAA+PROBE: NOT DETECTED

## 2025-02-22 RX ORDER — INSULIN LISPRO 100 [IU]/ML
INJECTION, SOLUTION INTRAVENOUS; SUBCUTANEOUS
Qty: 30 ML | Refills: 6 | Status: SHIPPED | OUTPATIENT
Start: 2025-02-22

## 2025-02-26 ENCOUNTER — TELEPHONE (OUTPATIENT)
Dept: INTERNAL MEDICINE | Facility: CLINIC | Age: 40
End: 2025-02-26
Payer: COMMERCIAL

## 2025-02-26 NOTE — TELEPHONE ENCOUNTER
BASHIR    Spoke to cindy with lab client services, strep a wasn't able to be ran due to the swab not being dry, attempted to call pt to notify, no response

## 2025-02-26 NOTE — TELEPHONE ENCOUNTER
----- Message from Cliff sent at 2/26/2025  2:45 AM CST -----  Regarding: Client Services  Contact: [phone]  Good Morning, My name is Cliff Martinez, I work in the Lab Client Services. We had a problem with some lab work on this patient. If someone from your office could call us at [phone] or [phone] that would be great. Anyone in my department can help.  Thank you,

## 2025-03-09 PROBLEM — E11.65 TYPE 2 DIABETES MELLITUS WITH HYPERGLYCEMIA, WITHOUT LONG-TERM CURRENT USE OF INSULIN: Status: RESOLVED | Noted: 2022-04-25 | Resolved: 2025-03-09

## 2025-03-09 PROBLEM — E11.65 TYPE 2 DIABETES MELLITUS WITH HYPERGLYCEMIA, WITH LONG-TERM CURRENT USE OF INSULIN: Status: ACTIVE | Noted: 2024-12-30

## 2025-04-02 RX ORDER — BLOOD SUGAR DIAGNOSTIC
STRIP MISCELLANEOUS
Qty: 300 EACH | Refills: 3 | Status: SHIPPED | OUTPATIENT
Start: 2025-04-02

## 2025-04-02 NOTE — TELEPHONE ENCOUNTER
No care due was identified.  [employer] Embedded Care Due Messages. Reference number: [id number].   4/02/2025 5:58:26 PM CDT

## 2025-04-03 NOTE — TELEPHONE ENCOUNTER
Refill Decision Note   Delmis Thrasher  is requesting a refill authorization.  Brief Assessment and Rationale for Refill:  Approve     Medication Therapy Plan:         Comments:     Note composed:7:54 PM 04/02/2025             Appointments     Last Visit   2/21/2025 Oliva Donnelly MD   Next Visit   6/23/2025 Oliva Donnelly MD

## 2025-05-15 RX ORDER — INSULIN GLARGINE 100 [IU]/ML
INJECTION, SOLUTION SUBCUTANEOUS
Qty: 15 ML | Refills: 3 | Status: SHIPPED | OUTPATIENT
Start: 2025-05-15

## 2025-06-12 ENCOUNTER — PATIENT MESSAGE (OUTPATIENT)
Dept: INTERNAL MEDICINE | Facility: CLINIC | Age: 40
End: 2025-06-12
Payer: COMMERCIAL

## 2025-06-13 NOTE — TELEPHONE ENCOUNTER
Spoke with pt and was able to schedule her an in person visit.     [facility]   Thank you for choosing us for your  care today.  If you have any questions about your ultrasound or care, please do not hesitate to contact us or your primary obstetrician.        Some general instructions for your pregnancy are:    Exercise: Aim for 150 minutes per week of regular exercise.  Walking is great!  Nutrition: Choose healthy sources of calcium, iron, and protein.  Avoid ultraprocessed foods and added sugar.  Learn about Preeclampsia: preeclampsia is a common, potentially serious high blood pressure complication in pregnancy.  A blood pressure of 140mmHg (systolic or top number) or 90mmHg (diastolic or bottom number) should be evaluated by your doctor.  Aspirin is sometimes prescribed in early pregnancy to prevent preeclampsia in women with risk factors - ask your obstetrician if you should be on this medication.  For more resources, visit:  https://[email or web address]  If you smoke, please try to quit completely but also try to reduce your smoking by as much as possible (as soon as possible).  Do not vape.  Please also avoid cannabis products.  Other warning signs to watch out for in pregnancy or postpartum: chest pain, obstructed breathing or shortness of breath, seizures, thoughts of hurting yourself or your baby, bleeding, a painful or swollen leg, fever, or headache (see [facility] POST-BIRTH Warning Signs campaign).  If these happen call 911.  Itching is also not normal in pregnancy and if you experience this, especially over your hands and feet, potentially worse at night, notify your doctors.

## 2025-06-17 ENCOUNTER — OFFICE VISIT (OUTPATIENT)
Dept: INTERNAL MEDICINE | Facility: CLINIC | Age: 40
End: 2025-06-17
Payer: COMMERCIAL

## 2025-06-17 ENCOUNTER — PATIENT MESSAGE (OUTPATIENT)
Dept: DIABETES | Facility: CLINIC | Age: 40
End: 2025-06-17
Payer: COMMERCIAL

## 2025-06-17 ENCOUNTER — TELEPHONE (OUTPATIENT)
Dept: DIABETES | Facility: CLINIC | Age: 40
End: 2025-06-17
Payer: COMMERCIAL

## 2025-06-17 ENCOUNTER — LAB VISIT (OUTPATIENT)
Dept: LAB | Facility: HOSPITAL | Age: 40
End: 2025-06-17
Payer: COMMERCIAL

## 2025-06-17 VITALS
SYSTOLIC BLOOD PRESSURE: 134 MMHG | DIASTOLIC BLOOD PRESSURE: 80 MMHG | OXYGEN SATURATION: 99 % | BODY MASS INDEX: 49.65 KG/M2 | HEART RATE: 59 BPM | HEIGHT: 61 IN | WEIGHT: 263 LBS

## 2025-06-17 DIAGNOSIS — E11.65 TYPE 2 DIABETES MELLITUS WITH HYPERGLYCEMIA, WITH LONG-TERM CURRENT USE OF INSULIN: ICD-10-CM

## 2025-06-17 DIAGNOSIS — E11.65 TYPE 2 DIABETES MELLITUS WITH HYPERGLYCEMIA, WITH LONG-TERM CURRENT USE OF INSULIN: Primary | ICD-10-CM

## 2025-06-17 DIAGNOSIS — Z30.09 FAMILY PLANNING: ICD-10-CM

## 2025-06-17 DIAGNOSIS — Z79.4 TYPE 2 DIABETES MELLITUS WITH HYPERGLYCEMIA, WITH LONG-TERM CURRENT USE OF INSULIN: ICD-10-CM

## 2025-06-17 DIAGNOSIS — E66.01 MORBID OBESITY WITH BMI OF 50.0-59.9, ADULT: ICD-10-CM

## 2025-06-17 DIAGNOSIS — Z79.4 TYPE 2 DIABETES MELLITUS WITH HYPERGLYCEMIA, WITH LONG-TERM CURRENT USE OF INSULIN: Primary | ICD-10-CM

## 2025-06-17 PROCEDURE — 3079F DIAST BP 80-89 MM HG: CPT | Mod: CPTII,S$GLB,, | Performed by: NURSE PRACTITIONER

## 2025-06-17 PROCEDURE — 3008F BODY MASS INDEX DOCD: CPT | Mod: CPTII,S$GLB,, | Performed by: NURSE PRACTITIONER

## 2025-06-17 PROCEDURE — 3075F SYST BP GE 130 - 139MM HG: CPT | Mod: CPTII,S$GLB,, | Performed by: NURSE PRACTITIONER

## 2025-06-17 PROCEDURE — 99214 OFFICE O/P EST MOD 30 MIN: CPT | Mod: S$GLB,,, | Performed by: NURSE PRACTITIONER

## 2025-06-17 PROCEDURE — 99999 PR PBB SHADOW E&M-EST. PATIENT-LVL IV: CPT | Mod: PBBFAC,,, | Performed by: NURSE PRACTITIONER

## 2025-06-17 PROCEDURE — 1160F RVW MEDS BY RX/DR IN RCRD: CPT | Mod: CPTII,S$GLB,, | Performed by: NURSE PRACTITIONER

## 2025-06-17 PROCEDURE — 36415 COLL VENOUS BLD VENIPUNCTURE: CPT

## 2025-06-17 PROCEDURE — 83036 HEMOGLOBIN GLYCOSYLATED A1C: CPT

## 2025-06-17 PROCEDURE — 1159F MED LIST DOCD IN RCRD: CPT | Mod: CPTII,S$GLB,, | Performed by: NURSE PRACTITIONER

## 2025-06-17 RX ORDER — TIRZEPATIDE 2.5 MG/.5ML
2.5 INJECTION, SOLUTION SUBCUTANEOUS
Qty: 4 PEN | Refills: 4 | Status: SHIPPED | OUTPATIENT
Start: 2025-06-17

## 2025-06-17 RX ORDER — BLOOD-GLUCOSE SENSOR
EACH MISCELLANEOUS
Qty: 3 EACH | Refills: 11 | Status: SHIPPED | OUTPATIENT
Start: 2025-06-17

## 2025-06-17 NOTE — PATIENT INSTRUCTIONS
Follow up in 4 months w/Irielle  A1c today  A1c in 4 months     Diabetes Medications/Plan    Insulins:  Lantus 40 units at night   Humalog 12-12-12 units before meals  150-200+2, 201-250+4, 251-300+6, 301-350+8, >350+10     Weekly injection:   Mounjaro 2.5 mg weekly       Goal sugar levels:  no higher than 180   Fasting/a.m. sugar goal: < 130   Goal A1c < 7%, average 154 mg/dl or less  Call or message me if consistently have sugar levels > 250 mg/dl, placed on steroids or for sick days.    [email or web address].[email or web address]--food hub  Eat fit marcin  Myfitnesspal marcin  [email or web address]   MySugr marcin  Glucose guide marcin  [email or web address]

## 2025-06-17 NOTE — PROGRESS NOTES
CHIEF COMPLAINT: Type 2 Diabetes     HPI: Mrs. Delmis Thrasher is a 39 y.o. female who was diagnosed with gestational diabetes, Type 2 DM in 2021.  Has 3 year old-daughter   Looking to family plan again.  Lab Results   Component Value Date    HGBA1C 7.6 (H) 11/13/2024     Last seen by me 11/2024   Being seen by me again today.      Discussed getting dexcom g7. Has been getting low sugars at times   Highest 240s  Low 70s    Seeing Dr. Jeansonne.  Looking to family plan.    3 meals a day   Wt watchers 2022   Breakfast - 2 boiled eggs, protein shake, iced coffee   Been making lunch for work, meats, cheese, wheat crackers   Dinner - salad, meat & cheese, protein shakes, red beans and rice         PREVIOUS DIABETES MEDICATIONS TRIED  Ozempic   Nph  Regular   Metformin - gi /severe  Lantus  Humalog     CURRENT DIABETIC MEDS: lantus 40 at nights, humalog 12-12-12  units before meals, 150-200+2, 201-250+4, 251-300+6, 301-350+8, >350+10     On MDI (injections 4 x a day)  Testing 4 x a day  Patient is willing and able to use the device  Demonstrated an understanding of the technology and is motivated to use CGM  Patient expected to adhere to a comprehensive diabetes treatment plan and patient has adequate medical supervision  Patient experiences recurrent, unexplained, severe (lows of <50 mg/dl) hypoglycemia   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    Diabetes Management Status    Statin: Not taking  ACE/ARB: Not taking    Screening or Prevention Patient's value Goal Complete/Controlled?   HgA1C Testing and Control   Lab Results   Component Value Date    HGBA1C 7.6 (H) 11/13/2024      Annually/Less than 8% Yes   Lipid profile : 10/22/2024 Annually Yes   LDL control Lab Results   Component Value Date    LDLCALC 57.4 (L) 10/22/2024    Annually/Less than 100 mg/dl  Yes   Nephropathy screening Lab Results   Component Value Date    LABMICR 9.0  "10/22/2024     Lab Results   Component Value Date    PROTEINUA Negative 10/22/2024    Annually Yes   Blood pressure BP Readings from Last 1 Encounters:   02/21/25 112/78    Less than 140/90 Yes   Dilated retinal exam : 12/09/2024 Annually Yes   Foot exam   : 07/12/2024 Annually Yes     REVIEW OF SYSTEMS  General: no weakness, fatigue, + weight changes 2# gain   Eyes: no double or blurred vision, eye pain, or redness  Cardiovascular: no chest pain, palpitations, edema, or murmurs.   Respiratory: no cough or dyspnea.   GI: no heartburn, nausea, or changes in bowel patterns; good appetite.   Skin: no rashes, dryness, itching, or reactions at insulin injection sites.  Neuro: no numbness, tingling, tremors, or vertigo. (R) arm- numbness at times-worsens at night -intermittent   Endocrine: no polyuria, polydipsia, polyphagia, heat or cold intolerance.     Vital Signs  /80 (BP Location: Left arm, Patient Position: Sitting)   Pulse (!) 59   Ht 5' 1" (1.549 m)   Wt 119.3 kg (263 lb 0.1 oz)   SpO2 99%   BMI 49.70 kg/m²     Hemoglobin A1C   Date Value Ref Range Status   11/13/2024 7.6 (H) 4.0 - 5.6 % Final     Comment:     ADA Screening Guidelines:  5.7-6.4%  Consistent with prediabetes  >or=6.5%  Consistent with diabetes    High levels of fetal hemoglobin interfere with the HbA1C  assay. Heterozygous hemoglobin variants (HbS, HgC, etc)do  not significantly interfere with this assay.   However, presence of multiple variants may affect accuracy.     10/22/2024 7.6 (H) 4.0 - 5.6 % Final     Comment:     ADA Screening Guidelines:  5.7-6.4%  Consistent with prediabetes  >or=6.5%  Consistent with diabetes    High levels of fetal hemoglobin interfere with the HbA1C  assay. Heterozygous hemoglobin variants (HbS, HgC, etc)do  not significantly interfere with this assay.   However, presence of multiple variants may affect accuracy.     02/19/2024 6.6 (H) 4.0 - 5.6 % Final     Comment:     ADA Screening Guidelines:  5.7-6.4%  " Consistent with prediabetes  >or=6.5%  Consistent with diabetes    High levels of fetal hemoglobin interfere with the HbA1C  assay. Heterozygous hemoglobin variants (HbS, HgC, etc)do  not significantly interfere with this assay.   However, presence of multiple variants may affect accuracy.       % HEMOGLOBIN A1C   Date Value Ref Range Status   06/03/2024 9.7 (H) <5.7 % of total Hgb Final     Comment:     For someone without known diabetes, a hemoglobin A1c  value of 6.5% or greater indicates that they may have   diabetes and this should be confirmed with a follow-up   test.    For someone with known diabetes, a value <7% indicates   that their diabetes is well controlled and a value   greater than or equal to 7% indicates suboptimal   control. A1c targets should be individualized based on   duration of diabetes, age, comorbid conditions, and   other considerations.    Currently, no consensus exists regarding use of  hemoglobin A1c for diagnosis of diabetes for children.        This test was performed on the Roche chidi c503 platform.  Effective 11/13/23, a change in test platforms from the  [employer]  to the Roche chidi c503 may have shifted  HbA1c results compared to historical results.  Based on laboratory validation testing conducted at  [facility], the Roche platform relative to the [employer]  platform had an average increase in HbA1c value of  < or = 0.3%. This difference is within accepted   variability established by the National Glycohemoglobin  Standardization Program. Note that not all individuals  will have had a shift in their results and direct  comparisons between historical and current results for  testing conducted on different platforms is not  recommended.            Chemistry        Component Value Date/Time     10/22/2024 0829    K 4.3 10/22/2024 0829     10/22/2024 0829    CO2 25 10/22/2024 0829    BUN 12 10/22/2024 0829    CREATININE 0.7 10/22/2024 0829     (H) 10/22/2024 0829         Component Value Date/Time    CALCIUM 9.1 10/22/2024 0829    ALKPHOS 57 10/22/2024 0829    AST 12 10/22/2024 0829    ALT 15 10/22/2024 0829    BILITOT 0.6 10/22/2024 0829    ESTGFRAFRICA >60.0 08/27/2021 1108    EGFRNONAA >60.0 08/27/2021 1108           Lab Results   Component Value Date    TSH 0.949 10/22/2024      Lab Results   Component Value Date    CHOL 105 (L) 10/22/2024    CHOL 96 (L) 03/11/2024    CHOL 96 (L) 10/17/2023     Lab Results   Component Value Date    HDL 37 (L) 10/22/2024    HDL 35 (L) 03/11/2024    HDL 37 (L) 10/17/2023     Lab Results   Component Value Date    LDLCALC 57.4 (L) 10/22/2024    LDLCALC 39.2 (L) 03/11/2024    LDLCALC 49.2 (L) 10/17/2023     Lab Results   Component Value Date    TRIG 53 10/22/2024    TRIG 109 03/11/2024    TRIG 49 10/17/2023     Lab Results   Component Value Date    CHOLHDL 35.2 10/22/2024    CHOLHDL 36.5 03/11/2024    CHOLHDL 38.5 10/17/2023         PHYSICAL EXAMINATION  Constitutional: Appears well, no distress Reviewed vitals above.  Eyes: conjunctivae & lids intact; PERRLA, EOMs intact; optic discs   Neck: Supple, trachea midline.   Respiratory: CTA without wheezes, even and unlabored, upon palpation wnl, percussion wnl  Cardiovascular: RRR; no edema or varicosities  Lymph: no lymphadenopathy palpated  Skin: warm and dry; no injection site reactions, no acanthosis nigracans observed.  Neuro:patient alert and cooperative, normal affect; steady gait.  Psychiatric: judgement & insight intact, orientation of time, place & person intact, memory; mood & affect wnl     Diabetes Foot Exam: deferred     Assessment/Plan    1. Type 2 diabetes mellitus with hyperglycemia, with long-term current use of insulin  Lab Results   Component Value Date    HGBA1C 7.6 (H) 11/13/2024      lantus 40 at nights, humalog 12-12-12  units before meals, 150-200+2, 201-250+4, 251-300+6, 301-350+8, >350+10     A1c goal <6.5%   Will get A1c today   A1c in 4 months     Grocery list given-eat  fit   Dexcom g7 start   Mounjaro 2.5 mg weekly start   Virtual DE with Cecilia         2. Morbid obesity with BMI of 50.0-59.9, adult  This condition increases insulin resistance  Encourage exercise 3-5 x a week, goal 150 minutes or more/week  If limited, encourage chair exercises -via youtube  Movement is key, walk after meals 15-30 minutes   Watch portions, protein  gm /day, carbs  gm /day, more fiber  Hydrate well, at least 64 oz, half body weight (lbs) in ounces per day  Look at resources per [facility] w/ apps/websites       3. Family planning  Thinking of having more kids,   Wants to loss weight and get A1c lower prior  Will start glp1          FOLLOW UP  In 4 months      Time: 30 mins

## 2025-06-18 ENCOUNTER — PATIENT MESSAGE (OUTPATIENT)
Dept: INTERNAL MEDICINE | Facility: CLINIC | Age: 40
End: 2025-06-18
Payer: COMMERCIAL

## 2025-06-18 LAB
EAG (OHS): 169 MG/DL (ref 68–131)
HBA1C MFR BLD: 7.5 % (ref 4–5.6)

## 2025-07-11 ENCOUNTER — PATIENT MESSAGE (OUTPATIENT)
Dept: INTERNAL MEDICINE | Facility: CLINIC | Age: 40
End: 2025-07-11
Payer: COMMERCIAL

## 2025-07-11 DIAGNOSIS — E11.65 TYPE 2 DIABETES MELLITUS WITH HYPERGLYCEMIA, WITH LONG-TERM CURRENT USE OF INSULIN: Primary | ICD-10-CM

## 2025-07-11 DIAGNOSIS — Z79.4 TYPE 2 DIABETES MELLITUS WITH HYPERGLYCEMIA, WITH LONG-TERM CURRENT USE OF INSULIN: Primary | ICD-10-CM

## 2025-07-11 RX ORDER — TIRZEPATIDE 5 MG/.5ML
5 INJECTION, SOLUTION SUBCUTANEOUS
Qty: 4 PEN | Refills: 4 | Status: SHIPPED | OUTPATIENT
Start: 2025-07-11

## 2025-08-13 ENCOUNTER — PATIENT MESSAGE (OUTPATIENT)
Dept: INTERNAL MEDICINE | Facility: CLINIC | Age: 40
End: 2025-08-13
Payer: COMMERCIAL

## 2025-08-31 RX ORDER — PEN NEEDLE, DIABETIC 30 GX3/16"
NEEDLE, DISPOSABLE MISCELLANEOUS
Qty: 100 EACH | Refills: 11 | Status: SHIPPED | OUTPATIENT
Start: 2025-08-31

## (undated) DEVICE — HANDLE CURETTE W/TUBING

## (undated) DEVICE — SOL 9P NACL IRR PIC IL

## (undated) DEVICE — Device

## (undated) DEVICE — VACURETTE 9MM CURVED

## (undated) DEVICE — GLOVE BIOGEL SKINSENSE PI 6.0

## (undated) DEVICE — SEE MEDLINE ITEM 157131